# Patient Record
Sex: FEMALE | Race: WHITE | NOT HISPANIC OR LATINO | Employment: FULL TIME | URBAN - METROPOLITAN AREA
[De-identification: names, ages, dates, MRNs, and addresses within clinical notes are randomized per-mention and may not be internally consistent; named-entity substitution may affect disease eponyms.]

---

## 2018-01-10 DIAGNOSIS — G56.03 BILATERAL CARPAL TUNNEL SYNDROME: Primary | ICD-10-CM

## 2018-01-10 PROBLEM — G56.00 CTS (CARPAL TUNNEL SYNDROME): Status: ACTIVE | Noted: 2018-01-10

## 2018-01-10 NOTE — PROGRESS NOTES
Patient is having numbness, tingling and pain in the right hand worse than the left. The pain is waking her at night, trouble with tryping and . Strength is intact to testing. Needs diagnostic studies.     Tri Callejas PA-C

## 2018-01-23 ENCOUNTER — HOSPITAL ENCOUNTER (OUTPATIENT)
Dept: NEUROLOGY | Facility: HOSPITAL | Age: 58
Discharge: HOME | End: 2018-01-23
Admitting: PHYSICIAN ASSISTANT

## 2018-01-23 PROCEDURE — 95910 NRV CNDJ TEST 7-8 STUDIES: CPT

## 2018-01-23 PROCEDURE — 95886 MUSC TEST DONE W/N TEST COMP: CPT

## 2018-01-24 ENCOUNTER — OFFICE VISIT (OUTPATIENT)
Dept: ORTHOPEDIC SURGERY | Facility: CLINIC | Age: 58
End: 2018-01-24

## 2018-01-24 VITALS
DIASTOLIC BLOOD PRESSURE: 71 MMHG | SYSTOLIC BLOOD PRESSURE: 135 MMHG | BODY MASS INDEX: 22.85 KG/M2 | HEIGHT: 63 IN | WEIGHT: 128.97 LBS | HEART RATE: 74 BPM

## 2018-01-24 DIAGNOSIS — M75.42 IMPINGEMENT SYNDROME OF LEFT SHOULDER: Primary | ICD-10-CM

## 2018-01-24 DIAGNOSIS — M75.41 IMPINGEMENT SYNDROME OF RIGHT SHOULDER: ICD-10-CM

## 2018-01-24 PROCEDURE — 99213 OFFICE O/P EST LOW 20 MIN: CPT | Performed by: ORTHOPAEDIC SURGERY

## 2018-01-24 PROCEDURE — 20610 DRAIN/INJ JOINT/BURSA W/O US: CPT | Performed by: ORTHOPAEDIC SURGERY

## 2018-01-24 RX ORDER — TRIAMCINOLONE ACETONIDE 40 MG/ML
40 INJECTION, SUSPENSION INTRA-ARTICULAR; INTRAMUSCULAR
Status: COMPLETED | OUTPATIENT
Start: 2018-01-24 | End: 2018-01-24

## 2018-01-24 RX ORDER — ACYCLOVIR 400 MG/1
400 TABLET ORAL 2 TIMES DAILY
COMMUNITY
End: 2018-11-06 | Stop reason: SDUPTHER

## 2018-01-24 RX ORDER — ALPRAZOLAM 0.5 MG/1
0.5 TABLET ORAL 2 TIMES DAILY PRN
COMMUNITY
End: 2021-02-12 | Stop reason: SDUPTHER

## 2018-01-24 RX ORDER — LIDOCAINE HYDROCHLORIDE 10 MG/ML
4 INJECTION, SOLUTION INFILTRATION; PERINEURAL
Status: COMPLETED | OUTPATIENT
Start: 2018-01-24 | End: 2018-01-24

## 2018-01-24 RX ORDER — NAPROXEN SODIUM 220 MG
400 TABLET ORAL 2 TIMES DAILY PRN
COMMUNITY
End: 2018-10-17 | Stop reason: SDUPTHER

## 2018-01-24 RX ORDER — RANITIDINE HCL 75 MG
150 TABLET ORAL DAILY
COMMUNITY
End: 2021-02-18

## 2018-01-24 RX ADMIN — LIDOCAINE HYDROCHLORIDE 4 ML: 10 INJECTION, SOLUTION INFILTRATION; PERINEURAL at 09:49

## 2018-01-24 RX ADMIN — TRIAMCINOLONE ACETONIDE 40 MG: 40 INJECTION, SUSPENSION INTRA-ARTICULAR; INTRAMUSCULAR at 09:49

## 2018-01-24 RX ADMIN — LIDOCAINE HYDROCHLORIDE 4 ML: 10 INJECTION, SOLUTION INFILTRATION; PERINEURAL at 09:50

## 2018-01-24 RX ADMIN — TRIAMCINOLONE ACETONIDE 40 MG: 40 INJECTION, SUSPENSION INTRA-ARTICULAR; INTRAMUSCULAR at 09:50

## 2018-01-24 NOTE — PROGRESS NOTES
Procedure   Large Joint Arthrocentesis  Date/Time: 1/24/2018 9:50 AM  Consent given by: patient  Site marked: site marked  Timeout: Immediately prior to procedure a time out was called to verify the correct patient, procedure, equipment, support staff and site/side marked as required   Supporting Documentation  Indications: pain   Procedure Details  Location: shoulder - L subacromial bursa  Preparation: Patient was prepped and draped in the usual sterile fashion  Needle size: 22 G  Approach: posterior  Medications administered: 4 mL lidocaine 1 %; 40 mg triamcinolone acetonide 40 MG/ML  Patient tolerance: patient tolerated the procedure well with no immediate complications

## 2018-01-24 NOTE — PROGRESS NOTES
[facility] [place] [facility] Note    Subjective     Chief Complaint   Patient presents with   • Left Shoulder - Pain   • Right Shoulder - Pain        HPI    Yeni Olivo is a 57 y.o. female. She presents today for evaluation of bilateral shoulder pain.  She has a known history of rotator cuff tendinitis in both her shoulders.  She had injections about 9 months ago, which provided relief.  The pain is moderate in severity, aching in quality, sharp at times.  The left bothers her more than the right.  This workup has included MRI of the left shoulder.  Previous x-rays have been obtained of both shoulders.      Patient Active Problem List   Diagnosis   • CTS (carpal tunnel syndrome)     Past Medical History:   Diagnosis Date   • Breast cancer      LEFT    • Drug therapy    • Radiation       Past Surgical History:   Procedure Laterality Date   • BREAST BIOPSY     •  SECTION     • HYSTERECTOMY     • MASTECTOMY      BILATERAL      Family History   Problem Relation Age of Onset   • COPD Mother    • No Known Problems Father    • No Known Problems Sister    • No Known Problems Brother    • No Known Problems Daughter    • No Known Problems Son    • Stroke Maternal Grandmother    • No Known Problems Paternal Grandmother    • No Known Problems Maternal Aunt    • No Known Problems Paternal Aunt      Social History     Social History   • Marital status:      Spouse name: N/A   • Number of children: N/A   • Years of education: N/A     Occupational History   • Not on file.     Social History Main Topics   • Smoking status: Current Every Day Smoker     Packs/day: 1.00     Years: 30.00     Types: Cigarettes   • Smokeless tobacco: Never Used   • Alcohol use No   • Drug use: No   • Sexual activity: Not on file     Other Topics Concern   • Not on file     Social History Narrative      Current Outpatient Prescriptions on File Prior to Visit   Medication Sig Dispense Refill   • venlafaxine XR (EFFEXOR-XR) 150  "MG 24 hr capsule Take 1 capsule by mouth Daily. 90 capsule 1   • amoxicillin (AMOXIL) 875 MG tablet Take 1 tablet by mouth 2 (Two) Times a Day. 20 tablet 0   • ciprofloxacin (CILOXAN) 0.3 % ophthalmic solution Administer 4 drops into affected ear(s) 3 (Three) Times a Day for 10 days. 5 mL 0     No current facility-administered medications on file prior to visit.       Allergies   Allergen Reactions   • Codeine Nausea Only        Review of Systems   Constitutional: Positive for activity change. Negative for chills and fever.   HENT: Positive for ear pain and hearing loss.    Eyes: Negative.    Respiratory: Negative.    Cardiovascular: Negative.    Gastrointestinal: Positive for diarrhea.   Endocrine: Negative.    Genitourinary: Negative.    Musculoskeletal: Positive for arthralgias (Bilateral shoulder pain), back pain, joint swelling and neck stiffness.   Skin: Negative.    Allergic/Immunologic: Negative.    Neurological: Negative.    Hematological: Bruises/bleeds easily.   Psychiatric/Behavioral: The patient is nervous/anxious.         Objective      Physical Exam  /71  Pulse 74  Ht 161 cm (63.39\")  Wt 58.5 kg (128 lb 15.5 oz)  BMI 22.57 kg/m2    Body mass index is 22.57 kg/(m^2).    General:   Mental Status:  Alert   Appearance: Cooperative, in no acute distress   Build and Nutrition: Well-nourished and well developed female   Orientation: Alert and oriented to person, place and time   Posture: Normal   Gait: Normal    Integument:   Right shoulder: No skin lesions, no rash, no ecchymosis   Left shoulder: No skin lesions, no rash, no ecchymosis    Upper Extremities:   Right Shoulder:    Tenderness: None    Swelling:  None    Crepitus:  None    Atrophy:  None    Range of motion:  External rotation:  30°       Forward flexion:  170°       Abduction:   150°  Instability:  None  Deformities:  None  Functional testing: Negative drop arm, negative lift-off, positive impingement   Left Shoulder:    Tenderness: "  None    Swelling:  None    Crepitus: None    Atrophy:  None    Range of motion:  External rotation:  30°       Forward flexion:  170°       Abduction:   150°  Instability:  None  Deformities:  None  Functional testing: Negative drop arm, negative lift-off, positive impingement      Assessment and Plan     Yeni was seen today for pain and pain.    Diagnoses and all orders for this visit:    Impingement syndrome of left shoulder  -     Large Joint Arthrocentesis    Impingement syndrome of right shoulder  -     Large Joint Arthrocentesis        I reviewed my findings with patient today.  She is bothered with bilateral shoulder rotator cuff tendinitis and impingement.  Acromial injections were provided today.  I will see her back in 3 months, but sooner for any problems.  Of note, she's had 2 previous injections in the right shoulder, and 5 previous injections of the left shoulder, starting in 2014.    Of note, she had about 85% relief in both her shoulders just a few minutes following the injections.    Return in about 3 months (around 4/24/2018).      Medical Decision Making  Management Options : prescription/IM medicine        Reginaldo Virk MD  01/24/18  10:17 AM

## 2018-01-24 NOTE — PROGRESS NOTES
Procedure   Large Joint Arthrocentesis  Date/Time: 1/24/2018 9:49 AM  Consent given by: patient  Site marked: site marked  Timeout: Immediately prior to procedure a time out was called to verify the correct patient, procedure, equipment, support staff and site/side marked as required   Supporting Documentation  Indications: pain   Procedure Details  Location: shoulder - R subacromial bursa  Preparation: Patient was prepped and draped in the usual sterile fashion  Needle size: 22 G  Approach: posterior  Medications administered: 4 mL lidocaine 1 %; 40 mg triamcinolone acetonide 40 MG/ML  Patient tolerance: patient tolerated the procedure well with no immediate complications

## 2018-01-29 ENCOUNTER — OFFICE VISIT (OUTPATIENT)
Dept: NEUROSURGERY | Facility: CLINIC | Age: 58
End: 2018-01-29

## 2018-01-29 ENCOUNTER — PREP FOR SURGERY (OUTPATIENT)
Dept: OTHER | Facility: HOSPITAL | Age: 58
End: 2018-01-29

## 2018-01-29 VITALS
BODY MASS INDEX: 23.21 KG/M2 | SYSTOLIC BLOOD PRESSURE: 122 MMHG | DIASTOLIC BLOOD PRESSURE: 84 MMHG | HEIGHT: 63 IN | TEMPERATURE: 98.6 F | WEIGHT: 131 LBS

## 2018-01-29 DIAGNOSIS — G56.01 CARPAL TUNNEL SYNDROME OF RIGHT WRIST: Primary | ICD-10-CM

## 2018-01-29 PROCEDURE — 99214 OFFICE O/P EST MOD 30 MIN: CPT | Performed by: PHYSICIAN ASSISTANT

## 2018-01-29 RX ORDER — ACETAMINOPHEN 325 MG/1
650 TABLET ORAL ONCE
Status: CANCELLED | OUTPATIENT
Start: 2018-01-29 | End: 2018-01-29

## 2018-01-29 RX ORDER — CEFAZOLIN SODIUM 2 G/100ML
2 INJECTION, SOLUTION INTRAVENOUS ONCE
Status: CANCELLED | OUTPATIENT
Start: 2018-01-29 | End: 2018-01-29

## 2018-01-29 RX ORDER — IBUPROFEN 800 MG/1
800 TABLET ORAL ONCE
Status: CANCELLED | OUTPATIENT
Start: 2018-01-29 | End: 2018-01-29

## 2018-01-29 RX ORDER — HYDROCODONE BITARTRATE AND ACETAMINOPHEN 7.5; 325 MG/1; MG/1
1 TABLET ORAL ONCE
Status: CANCELLED | OUTPATIENT
Start: 2018-01-29 | End: 2018-01-29

## 2018-01-29 RX ORDER — SODIUM CHLORIDE 0.9 % (FLUSH) 0.9 %
1-10 SYRINGE (ML) INJECTION AS NEEDED
Status: CANCELLED | OUTPATIENT
Start: 2018-01-29

## 2018-01-29 RX ORDER — SODIUM CHLORIDE, SODIUM LACTATE, POTASSIUM CHLORIDE, CALCIUM CHLORIDE 600; 310; 30; 20 MG/100ML; MG/100ML; MG/100ML; MG/100ML
100 INJECTION, SOLUTION INTRAVENOUS CONTINUOUS
Status: CANCELLED | OUTPATIENT
Start: 2018-01-29

## 2018-01-29 NOTE — PROGRESS NOTES
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  Patient has had several months of burning in the first second and third digit of her right hand as well as some weakness in the pincer .  Patient was consulted in the hospital by Laura Callejas, and recommended that we get an EMG nerve conduction study of the upper extremities.  Patient is actually new patient to our practice, but is well-known to us.    Patient presented today with EMG nerve conduction study of the right upper extremity that showed mild carpal tunnel bilaterally.  However, Laura Callejas has talked with electrophysiologist and he stated that her symptoms are much more impressive than the readout showed.  Patient has a obviously positive Tinel and Phalen test on the right.  Patient is waking up at night shaking her hand and is having difficulty with  strength.  All these in combination makes us think that she would be a good candidate for right carpal tunnel release.    Review of Systems  14 point review systems performed and negative other than history of present illness.      Past Medical History:     Past Medical History:   Diagnosis Date   • Breast cancer      LEFT    • Drug therapy    • Radiation        Family History:     Family History   Problem Relation Age of Onset   • COPD Mother    • No Known Problems Father    • No Known Problems Sister    • No Known Problems Brother    • No Known Problems Daughter    • No Known Problems Son    • Stroke Maternal Grandmother    • No Known Problems Paternal Grandmother    • No Known Problems Maternal Aunt    • No Known Problems Paternal Aunt        Social History:    reports that she has been smoking Cigarettes.  She has a 30.00 pack-year smoking  "history. She has never used smokeless tobacco. She reports that she does not drink alcohol or use illicit drugs.   SMOKING STATUS: I spent greater than 10 minutes educating the patient on smoking cessation, and discussed how smoking pertains to the particular disease process at hand.  The patient acknowledges understanding.      Surgical History:     Past Surgical History:   Procedure Laterality Date   • BREAST BIOPSY     •  SECTION     • HYSTERECTOMY     • MASTECTOMY      BILATERAL       Allergies:   Codeine    Physical Exam:    Vital Signs:/84 (BP Location: Left arm, Patient Position: Sitting)  Temp 98.6 °F (37 °C) (Temporal Artery )   Ht 161 cm (63.39\")  Wt 59.4 kg (131 lb)  BMI 22.92 kg/m2   BMI: Body mass index is 22.92 kg/(m^2).    GENEREAL:   The patient is in no acute distress, and is able to answer all questions appropriately.  HEENT: Pupils are equal and reactive to light.  Visual fields are full.  Extraocular movements are intact without nystagmus.  There is no evidence of trauma.  Neck: Supple without lymphadenopathy  Cardiovascular: Regular rate and rhythm   Abdomen: Soft, non-tender, non-distended.    Musculoskeletal: The patient’s strength is intact in upper and lower extremities upon direct testing.   strength is 5 out of 5 bilaterally. Shoulder abduction is 5 out of 5.  Dorsiflexion and plantarflexion are equal bilaterally as well as the hip flexion against resistance.  The patient’s gait is normal without antalgia.  Neurologic: The patient is alert and oriented by 3.  Recent memory, language, attention span, and fund of knowledge are all with within normal limits.  There is no evidence of central motor drift. No facial droop.  No difficulty with rapid alternating movements.  Sensation is equal bilaterally with no deficit.    Reflexes are 2+ at biceps, triceps, brachioradialis, as well as the patellar and Achilles tendon bilaterally.    Patient has a positive Tinel and Phalen " test on the right.  Patient has some decreased pinprick and light touch testing the first second and third digit and palmaris aspect of her right hand.      Medical Decision Making    Data Review:   MRI cervical spine was reviewed and showed mild to moderate stenosis at C5 6 and C6 7 level.  But there is no rightward protrusion that would account for her pain.  EMG and nerve Study was reviewed and discussed with Dr. Vasquez.  The readout was less impressive to than her actual symptoms.  Laura Callejas's discussed this with Dr. Núñez, and we elected to proceed with the release.    Diagnosis:   Right carpal tunnel syndrome    Treatment Options:   We have elected to proceed with a right carpal tunnel release.  I reviewed risks, benefits, and expected outcomes with patient.  Patient does have arthritis in her hands and this is not expected to improve, however, the numbness, tingling/burning in her first second and third digits as well as some of the strength in her right hand should improve over time.     It has been a pleasure providing neurosurgical care.    Blu Peacock PA-C      No diagnosis found.

## 2018-03-05 ENCOUNTER — LAB REQUISITION (OUTPATIENT)
Dept: LAB | Facility: HOSPITAL | Age: 58
End: 2018-03-05

## 2018-03-05 ENCOUNTER — OUTSIDE FACILITY SERVICE (OUTPATIENT)
Dept: GASTROENTEROLOGY | Facility: CLINIC | Age: 58
End: 2018-03-05

## 2018-03-05 DIAGNOSIS — Z12.11 ENCOUNTER FOR SCREENING FOR MALIGNANT NEOPLASM OF COLON: ICD-10-CM

## 2018-03-05 PROCEDURE — 88305 TISSUE EXAM BY PATHOLOGIST: CPT | Performed by: INTERNAL MEDICINE

## 2018-03-05 PROCEDURE — 45380 COLONOSCOPY AND BIOPSY: CPT | Performed by: INTERNAL MEDICINE

## 2018-03-05 PROCEDURE — G0500 MOD SEDAT ENDO SERVICE >5YRS: HCPCS | Performed by: INTERNAL MEDICINE

## 2018-03-06 LAB
CYTO UR: NORMAL
LAB AP CASE REPORT: NORMAL
LAB AP CLINICAL INFORMATION: NORMAL
Lab: NORMAL
PATH REPORT.FINAL DX SPEC: NORMAL
PATH REPORT.GROSS SPEC: NORMAL

## 2018-03-07 ENCOUNTER — APPOINTMENT (OUTPATIENT)
Dept: NEUROLOGY | Facility: HOSPITAL | Age: 58
End: 2018-03-07

## 2018-03-12 ENCOUNTER — TELEPHONE (OUTPATIENT)
Dept: GASTROENTEROLOGY | Facility: CLINIC | Age: 58
End: 2018-03-12

## 2018-03-12 NOTE — TELEPHONE ENCOUNTER
----- Message from MARIA Garcia sent at 3/9/2018 10:36 AM EST -----      ----- Message -----     From: Mat Wiggins MD     Sent: 3/6/2018   6:11 PM       To: MARIA Garcia, Arabella Alva MA    Please tell Yeni that the polyp was benign. Previous history merits a follow up colonoscopy in 5 years. Dr. Wiggins

## 2018-04-23 ENCOUNTER — OFFICE VISIT (OUTPATIENT)
Dept: ORTHOPEDIC SURGERY | Facility: CLINIC | Age: 58
End: 2018-04-23

## 2018-04-23 VITALS
HEIGHT: 63 IN | DIASTOLIC BLOOD PRESSURE: 67 MMHG | WEIGHT: 132 LBS | HEART RATE: 87 BPM | BODY MASS INDEX: 23.39 KG/M2 | SYSTOLIC BLOOD PRESSURE: 121 MMHG

## 2018-04-23 DIAGNOSIS — M75.42 IMPINGEMENT SYNDROME OF LEFT SHOULDER: Primary | ICD-10-CM

## 2018-04-23 DIAGNOSIS — M75.41 IMPINGEMENT SYNDROME OF RIGHT SHOULDER: ICD-10-CM

## 2018-04-23 PROCEDURE — 20610 DRAIN/INJ JOINT/BURSA W/O US: CPT | Performed by: ORTHOPAEDIC SURGERY

## 2018-04-23 RX ORDER — LIDOCAINE HYDROCHLORIDE 10 MG/ML
4 INJECTION, SOLUTION INFILTRATION; PERINEURAL
Status: COMPLETED | OUTPATIENT
Start: 2018-04-23 | End: 2018-04-23

## 2018-04-23 RX ORDER — TRIAMCINOLONE ACETONIDE 40 MG/ML
40 INJECTION, SUSPENSION INTRA-ARTICULAR; INTRAMUSCULAR
Status: COMPLETED | OUTPATIENT
Start: 2018-04-23 | End: 2018-04-23

## 2018-04-23 RX ADMIN — LIDOCAINE HYDROCHLORIDE 4 ML: 10 INJECTION, SOLUTION INFILTRATION; PERINEURAL at 16:21

## 2018-04-23 RX ADMIN — TRIAMCINOLONE ACETONIDE 40 MG: 40 INJECTION, SUSPENSION INTRA-ARTICULAR; INTRAMUSCULAR at 16:21

## 2018-04-23 RX ADMIN — TRIAMCINOLONE ACETONIDE 40 MG: 40 INJECTION, SUSPENSION INTRA-ARTICULAR; INTRAMUSCULAR at 16:23

## 2018-04-23 RX ADMIN — LIDOCAINE HYDROCHLORIDE 4 ML: 10 INJECTION, SOLUTION INFILTRATION; PERINEURAL at 16:23

## 2018-04-23 NOTE — PROGRESS NOTES
Procedure   Large Joint Arthrocentesis  Date/Time: 4/23/2018 4:21 PM  Consent given by: patient  Site marked: site marked  Timeout: Immediately prior to procedure a time out was called to verify the correct patient, procedure, equipment, support staff and site/side marked as required   Supporting Documentation  Indications: pain   Procedure Details  Location: shoulder - R subacromial bursa  Preparation: Patient was prepped and draped in the usual sterile fashion  Needle size: 22 G  Approach: anterolateral  Medications administered: 4 mL lidocaine 1 %; 40 mg triamcinolone acetonide 40 MG/ML  Patient tolerance: patient tolerated the procedure well with no immediate complications

## 2018-04-23 NOTE — PROGRESS NOTES
Procedure   Large Joint Arthrocentesis  Date/Time: 4/23/2018 4:23 PM  Consent given by: patient  Site marked: site marked  Timeout: Immediately prior to procedure a time out was called to verify the correct patient, procedure, equipment, support staff and site/side marked as required   Supporting Documentation  Indications: pain   Procedure Details  Location: shoulder - L subacromial bursa  Preparation: Patient was prepped and draped in the usual sterile fashion  Needle size: 22 G  Approach: anterolateral  Medications administered: 4 mL lidocaine 1 %; 40 mg triamcinolone acetonide 40 MG/ML  Patient tolerance: patient tolerated the procedure well with no immediate complications

## 2018-04-23 NOTE — PROGRESS NOTES
[facility] [place] [place] [facility] Clinic Note    Subjective     Chief Complaint   Patient presents with   • Right Shoulder - Follow-up     Impingement syndrome of (R) shoulder   • Left Shoulder - Follow-up     Impingement syndrome of (L) shoulder        HPI    Yeni Olivo is a 57 y.o. female. She follows up today for both her shoulders.  She had good relief with the injections for several weeks, and is resting point where she would like repeat injections today.  No other new complaints today.  The pain is moderate in severity, aching in quality, and stabbing.      Patient Active Problem List   Diagnosis   • CTS (carpal tunnel syndrome)     Past Medical History:   Diagnosis Date   • Breast cancer      LEFT    • Drug therapy    • Radiation       Past Surgical History:   Procedure Laterality Date   • BREAST BIOPSY     •  SECTION     • HYSTERECTOMY     • MASTECTOMY      BILATERAL      Family History   Problem Relation Age of Onset   • COPD Mother    • No Known Problems Father    • No Known Problems Sister    • No Known Problems Brother    • No Known Problems Daughter    • No Known Problems Son    • Stroke Maternal Grandmother    • No Known Problems Paternal Grandmother    • No Known Problems Maternal Aunt    • No Known Problems Paternal Aunt      Social History     Social History   • Marital status:      Spouse name: N/A   • Number of children: N/A   • Years of education: N/A     Occupational History   • Not on file.     Social History Main Topics   • Smoking status: Current Every Day Smoker     Packs/day: 1.00     Years: 30.00     Types: Cigarettes   • Smokeless tobacco: Never Used   • Alcohol use No   • Drug use: No   • Sexual activity: Defer     Other Topics Concern   • Not on file     Social History Narrative   • No narrative on file      Current Outpatient Prescriptions on File Prior to Visit   Medication Sig Dispense Refill   • acyclovir (ZOVIRAX) 400 MG tablet Take 400 mg by mouth 2 (Two)  Times a Day. Take no more than 5 doses a day.     • acyclovir (ZOVIRAX) 400 MG tablet Take 1 tablet by mouth 2 (Two) Times a Day 180 tablet 1   • ALPRAZolam (XANAX) 0.5 MG tablet Take 0.5 mg by mouth 2 (Two) Times a Day As Needed for Anxiety.     • Chlorhexidine Gluconate 4 % solution Shower each day with solution for 5 days beginning 5 days before surgery. 237 mL 0   • Multiple Vitamins-Minerals (MULTIVITAMIN ADULT PO) Take  by mouth Daily.     • naproxen sodium (ALEVE) 220 MG tablet Take 400 mg by mouth 2 (Two) Times a Day As Needed.     • raNITIdine (ZANTAC) 75 MG tablet Take 50 mg by mouth Daily.     • venlafaxine XR (EFFEXOR-XR) 150 MG 24 hr capsule Take 1 capsule by mouth Daily. 90 capsule 1     No current facility-administered medications on file prior to visit.       Allergies   Allergen Reactions   • Codeine Nausea Only        Review of Systems   Constitutional: Negative for activity change, appetite change, chills, diaphoresis, fatigue, fever and unexpected weight change.   HENT: Negative for congestion, dental problem, drooling, ear discharge, ear pain, facial swelling, hearing loss, mouth sores, nosebleeds, postnasal drip, rhinorrhea, sinus pressure, sneezing, sore throat, tinnitus, trouble swallowing and voice change.    Eyes: Negative for photophobia, pain, discharge, redness, itching and visual disturbance.   Respiratory: Negative for apnea, cough, choking, chest tightness, shortness of breath, wheezing and stridor.    Cardiovascular: Negative for chest pain, palpitations and leg swelling.   Gastrointestinal: Negative for abdominal distention, abdominal pain, anal bleeding, blood in stool, constipation, diarrhea, nausea, rectal pain and vomiting.   Endocrine: Negative for cold intolerance, heat intolerance, polydipsia, polyphagia and polyuria.   Genitourinary: Negative for decreased urine volume, difficulty urinating, dysuria, enuresis, flank pain, frequency, genital sores, hematuria and urgency.  "  Musculoskeletal: Positive for joint swelling. Negative for arthralgias, back pain, gait problem, myalgias, neck pain and neck stiffness.        Joint Pain    Skin: Negative for color change, pallor, rash and wound.   Allergic/Immunologic: Negative for environmental allergies, food allergies and immunocompromised state.   Neurological: Negative for dizziness, tremors, seizures, syncope, facial asymmetry, speech difficulty, weakness, light-headedness, numbness and headaches.   Hematological: Negative for adenopathy. Does not bruise/bleed easily.   Psychiatric/Behavioral: Negative for agitation, behavioral problems, confusion, decreased concentration, dysphoric mood, hallucinations, self-injury, sleep disturbance and suicidal ideas. The patient is not nervous/anxious and is not hyperactive.         Objective      Physical Exam  /67   Pulse 87   Ht 161 cm (63.39\")   Wt 59.9 kg (132 lb)   BMI 23.10 kg/m²     Body mass index is 23.1 kg/m².    General:   Mental Status:  Alert   Appearance: Cooperative, in no acute distress   Build and Nutrition: Well-nourished and well developed female   Orientation: Alert and oriented to person, place and time   Posture: Normal   Gait: Normal    Integument:   Right shoulder: No skin lesions, no rash, no ecchymosis   Left shoulder: No skin lesions, no rash, no ecchymosis    Upper Extremities:   Right Shoulder:    Tenderness: None    Swelling:  None    Crepitus:  None    Atrophy:  None    Range of motion:  External rotation:  70°       Forward flexion:  170°       Abduction:   170°  Instability:  None  Deformities:  None  Functional testing: Negative drop arm, negative lift-off, mildly positive impingement   Left Shoulder:    Tenderness:  None    Swelling:  None    Crepitus: None    Atrophy:  None    Range of motion:  External rotation:  70°       Forward flexion:  170°       Abduction:   170°  Instability:  None  Deformities:  None  Functional testing: Negative drop arm, " negative lift-off, positive impingement          Assessment and Plan     Yeni was seen today for follow-up and follow-up.    Diagnoses and all orders for this visit:    Impingement syndrome of left shoulder  -     Large Joint Arthrocentesis  -     lidocaine (XYLOCAINE) 1 % injection 4 mL; 4 mL Once.  -     triamcinolone acetonide (KENALOG-40) injection 40 mg; 40 mg Once.    Impingement syndrome of right shoulder  -     Large Joint Arthrocentesis  -     lidocaine (XYLOCAINE) 1 % injection 4 mL; 4 mL Once.  -     triamcinolone acetonide (KENALOG-40) injection 40 mg; 40 mg Once.        I reviewed my findings with patient today.  She has bilateral shoulder impingement, and would like repeat injections today.  Please see my procedure notes for details.  I will see her back in 3 months, with new x-rays.  I will see her back sooner for any problems.  We may consider repeat imaging with MRI if appropriate in the future.    Of note, she had 100% relief just payments following the injections.    Return in about 3 months (around 7/23/2018).      Medical Decision Making  Management Options : prescription/IM medicine      Reginaldo Virk MD  04/23/18  4:34 PM

## 2018-06-20 PROCEDURE — 88300 SURGICAL PATH GROSS: CPT | Performed by: OTOLARYNGOLOGY

## 2018-06-21 ENCOUNTER — LAB REQUISITION (OUTPATIENT)
Dept: LAB | Facility: HOSPITAL | Age: 58
End: 2018-06-21

## 2018-06-21 DIAGNOSIS — K11.5 SIALOLITHIASIS: ICD-10-CM

## 2018-06-21 LAB
LAB AP CASE REPORT: NORMAL
LAB AP CLINICAL INFORMATION: NORMAL
PATH REPORT.FINAL DX SPEC: NORMAL
PATH REPORT.GROSS SPEC: NORMAL

## 2018-09-05 ENCOUNTER — OFFICE VISIT (OUTPATIENT)
Dept: ORTHOPEDIC SURGERY | Facility: CLINIC | Age: 58
End: 2018-09-05

## 2018-09-05 VITALS — HEART RATE: 71 BPM | BODY MASS INDEX: 21.95 KG/M2 | OXYGEN SATURATION: 99 % | WEIGHT: 123.9 LBS | HEIGHT: 63 IN

## 2018-09-05 DIAGNOSIS — M75.41 IMPINGEMENT SYNDROME OF BOTH SHOULDERS: Primary | ICD-10-CM

## 2018-09-05 DIAGNOSIS — M75.42 IMPINGEMENT SYNDROME OF BOTH SHOULDERS: Primary | ICD-10-CM

## 2018-09-05 PROCEDURE — 99213 OFFICE O/P EST LOW 20 MIN: CPT | Performed by: ORTHOPAEDIC SURGERY

## 2018-09-05 PROCEDURE — 20610 DRAIN/INJ JOINT/BURSA W/O US: CPT | Performed by: ORTHOPAEDIC SURGERY

## 2018-09-05 RX ORDER — TRIAMCINOLONE ACETONIDE 40 MG/ML
40 INJECTION, SUSPENSION INTRA-ARTICULAR; INTRAMUSCULAR
Status: COMPLETED | OUTPATIENT
Start: 2018-09-05 | End: 2018-09-05

## 2018-09-05 RX ORDER — LIDOCAINE HYDROCHLORIDE 10 MG/ML
4 INJECTION, SOLUTION INFILTRATION; PERINEURAL
Status: COMPLETED | OUTPATIENT
Start: 2018-09-05 | End: 2018-09-05

## 2018-09-05 RX ADMIN — LIDOCAINE HYDROCHLORIDE 4 ML: 10 INJECTION, SOLUTION INFILTRATION; PERINEURAL at 16:18

## 2018-09-05 RX ADMIN — TRIAMCINOLONE ACETONIDE 40 MG: 40 INJECTION, SUSPENSION INTRA-ARTICULAR; INTRAMUSCULAR at 16:18

## 2018-09-05 NOTE — PROGRESS NOTES
Procedure   Large Joint Arthrocentesis  Date/Time: 9/5/2018 4:18 PM  Consent given by: patient  Site marked: site marked  Timeout: Immediately prior to procedure a time out was called to verify the correct patient, procedure, equipment, support staff and site/side marked as required   Supporting Documentation  Indications: pain   Procedure Details  Location: shoulder - R subacromial bursa  Preparation: Patient was prepped and draped in the usual sterile fashion  Needle size: 22 G  Approach: posterior  Medications administered: 4 mL lidocaine 1 %; 40 mg triamcinolone acetonide 40 MG/ML  Patient tolerance: patient tolerated the procedure well with no immediate complications    Large Joint Arthrocentesis  Date/Time: 9/5/2018 4:18 PM  Consent given by: patient  Site marked: site marked  Timeout: Immediately prior to procedure a time out was called to verify the correct patient, procedure, equipment, support staff and site/side marked as required   Supporting Documentation  Indications: pain   Procedure Details  Location: shoulder - L subacromial bursa  Preparation: Patient was prepped and draped in the usual sterile fashion  Needle size: 22 G  Approach: posterior  Medications administered: 4 mL lidocaine 1 %; 40 mg triamcinolone acetonide 40 MG/ML  Patient tolerance: patient tolerated the procedure well with no immediate complications

## 2018-09-05 NOTE — PROGRESS NOTES
[facility] [place] [facility] Note    Subjective     Chief Complaint   Patient presents with   • Follow-up     4 months - Impingement syndrome of Bilateral shoulders        HPI    Yeni Olivo is a 58 y.o. female.  She follows up today for both her shoulders.  The left one bothers her more than the right.  She has good improvement with subacromial injections, and would like injections today.  The pain is moderate in severity, aching in quality, worse with overhead activities.      Patient Active Problem List   Diagnosis   • CTS (carpal tunnel syndrome)     Past Medical History:   Diagnosis Date   • Breast cancer (CMS/HCC)      LEFT    • Drug therapy    • Radiation       Past Surgical History:   Procedure Laterality Date   • BREAST BIOPSY     •  SECTION     • HYSTERECTOMY     • MASTECTOMY      BILATERAL      Family History   Problem Relation Age of Onset   • COPD Mother    • No Known Problems Father    • No Known Problems Sister    • No Known Problems Brother    • No Known Problems Daughter    • No Known Problems Son    • Stroke Maternal Grandmother    • No Known Problems Paternal Grandmother    • No Known Problems Maternal Aunt    • No Known Problems Paternal Aunt      Social History     Social History   • Marital status:      Spouse name: N/A   • Number of children: N/A   • Years of education: N/A     Occupational History   • Not on file.     Social History Main Topics   • Smoking status: Current Every Day Smoker     Packs/day: 1.00     Years: 30.00     Types: Cigarettes   • Smokeless tobacco: Never Used   • Alcohol use No   • Drug use: No   • Sexual activity: Defer     Other Topics Concern   • Not on file     Social History Narrative   • No narrative on file      Current Outpatient Prescriptions on File Prior to Visit   Medication Sig Dispense Refill   • acyclovir (ZOVIRAX) 400 MG tablet Take 400 mg by mouth 2 (Two) Times a Day. Take no more than 5 doses a day.     • acyclovir  (ZOVIRAX) 400 MG tablet Take 1 tablet by mouth 2 (Two) Times a Day. Take no more than 5 doses a day. 180 tablet 1   • acyclovir (ZOVIRAX) 400 MG tablet Take 1 tablet by mouth 2 (Two) Times a Day. Take no more than 5 doses a day. 180 tablet 1   • ALPRAZolam (XANAX) 0.5 MG tablet Take 0.5 mg by mouth 2 (Two) Times a Day As Needed for Anxiety.     • ALPRAZolam (XANAX) 0.5 MG tablet Take 2 tablets by mouth 2 (Two) Times a Day. 360 tablet 0   • cefdinir (OMNICEF) 300 MG capsule Take 1 capsule by mouth 2 (Two) Times a Day for 7 days 14 capsule 0   • Chlorhexidine Gluconate 4 % solution Shower each day with solution for 5 days beginning 5 days before surgery. 237 mL 0   • HYDROcodone-acetaminophen (NORCO)  MG per tablet Take 1 tablet by mouth Every 4 (Four) Hours As Needed for pain 25 tablet 0   • Multiple Vitamins-Minerals (MULTIVITAMIN ADULT PO) Take  by mouth Daily.     • naproxen sodium (ALEVE) 220 MG tablet Take 400 mg by mouth 2 (Two) Times a Day As Needed.     • ondansetron ODT (ZOFRAN-ODT) 4 MG disintegrating tablet Dissolve 1 tablet by mouth Every 4 (Four) Hours As Needed for nausea and vomiting 10 tablet 0   • raNITIdine (ZANTAC) 75 MG tablet Take 50 mg by mouth Daily.     • venlafaxine XR (EFFEXOR-XR) 150 MG 24 hr capsule Take 1 capsule by mouth Daily. 90 capsule 1     No current facility-administered medications on file prior to visit.       Allergies   Allergen Reactions   • Codeine Nausea Only        Review of Systems   Constitutional: Positive for fatigue. Negative for activity change, appetite change, chills, diaphoresis, fever and unexpected weight change.   HENT: Positive for ear pain and hearing loss. Negative for congestion, dental problem, drooling, ear discharge, facial swelling, mouth sores, nosebleeds, postnasal drip, rhinorrhea, sinus pressure, sneezing, sore throat, tinnitus, trouble swallowing and voice change.    Eyes: Negative for photophobia, pain, discharge, redness, itching and visual  "disturbance.   Respiratory: Negative for apnea, cough, choking, chest tightness, shortness of breath, wheezing and stridor.    Cardiovascular: Negative for chest pain, palpitations and leg swelling.   Gastrointestinal: Negative for abdominal distention, abdominal pain, anal bleeding, blood in stool, constipation, diarrhea, nausea, rectal pain and vomiting.        IBS   Endocrine: Negative for cold intolerance, heat intolerance, polydipsia, polyphagia and polyuria.   Genitourinary: Negative for decreased urine volume, difficulty urinating, dysuria, enuresis, flank pain, frequency, genital sores, hematuria and urgency.   Musculoskeletal: Positive for arthralgias. Negative for back pain, gait problem, joint swelling, myalgias, neck pain and neck stiffness.   Skin: Negative for color change, pallor, rash and wound.   Allergic/Immunologic: Negative for environmental allergies, food allergies and immunocompromised state.   Neurological: Negative for dizziness, tremors, seizures, syncope, facial asymmetry, speech difficulty, weakness, light-headedness, numbness and headaches.   Hematological: Negative for adenopathy. Bruises/bleeds easily.   Psychiatric/Behavioral: Negative for agitation, behavioral problems, confusion, decreased concentration, dysphoric mood, hallucinations, self-injury, sleep disturbance and suicidal ideas. The patient is not nervous/anxious and is not hyperactive.         Depression        Objective      Physical Exam  Pulse 71   Ht 161 cm (63.39\")   Wt 56.2 kg (123 lb 14.4 oz)   SpO2 99%   BMI 21.68 kg/m²     Body mass index is 21.68 kg/m².    General:   Mental Status:  Alert   Appearance: Cooperative, in no acute distress   Build and Nutrition: Well-nourished and well developed female   Orientation: Alert and oriented to person, place and time   Posture: Normal   Gait: Normal    Integument:   Right shoulder: No skin lesions, no rash, no ecchymosis   Left shoulder: No skin lesions, no rash, no " ecchymosis    Upper Extremities:   Right Shoulder:    Tenderness: None    Swelling:  None    Crepitus:  None    Atrophy:  None    Range of motion:  External rotation:  70°       Forward flexion:  170°       Abduction:   160°  Instability:  None  Deformities:  None  Functional testing: Negative drop arm, negative lift-off, negative impingement   Left Shoulder:    Tenderness:  None    Swelling:  None    Crepitus: None    Atrophy:  None    Range of motion:  External rotation:  40°       Forward flexion:  150°       Abduction:   100°  Instability:  None  Deformities:  None  Functional testing: Negative drop arm, negative lift-off, positive impingement        Imaging/Studies      Imaging Results (last 24 hours)     Procedure Component Value Units Date/Time    XR Shoulder 2+ View Bilateral [[id number]] Resulted:  09/05/18 1635     Updated:  09/05/18 1636    Narrative:       Right Shoulder Radiographs  Indication: right shoulder pain  Views: AP, outlet and axillary views of the right shoulder    Comparison: no prior studies available for review    Findings:  No acute bony abnormalities, with a small calcification at the insertion   point on the greater tuberosity, consistent with calcific tendinitis.      Left Shoulder Radiographs  Indication: left shoulder pain  Views: AP, outlet and axillary views of the left shoulder    Comparison: no prior studies available for review    Findings:  No acute bony abnormalities, with calcification at the insertion point of   the supraspinatus, consistent with calcific tendinitis.              Assessment and Plan     Yeni was seen today for follow-up.    Diagnoses and all orders for this visit:    Impingement syndrome of both shoulders  -     XR Shoulder 2+ View Bilateral  -     Large Joint Arthrocentesis  -     Large Joint Arthrocentesis        I reviewed my findings with patient today.  She continues to experience impingement both her shoulders.  She has opted for injections today, and  these were provided.  She had 100% relief just a few minutes following the injections.  I will see her back in 4 months, but sooner for problems.    Return in about 4 months (around 1/5/2019).      Medical Decision Making  Management Options : prescription/IM medicine  Data/Risk: radiology tests and independent visualization of imaging, lab tests, or EMG/NCV      Reginaldo Virk MD  09/05/18  4:41 PM

## 2018-10-17 ENCOUNTER — TELEPHONE (OUTPATIENT)
Dept: ORTHOPEDIC SURGERY | Facility: CLINIC | Age: 58
End: 2018-10-17

## 2018-10-17 ENCOUNTER — OFFICE VISIT (OUTPATIENT)
Dept: ORTHOPEDIC SURGERY | Facility: CLINIC | Age: 58
End: 2018-10-17

## 2018-10-17 VITALS — OXYGEN SATURATION: 98 % | WEIGHT: 118 LBS | HEART RATE: 85 BPM | BODY MASS INDEX: 20.91 KG/M2 | HEIGHT: 63 IN

## 2018-10-17 DIAGNOSIS — M16.11 PRIMARY OSTEOARTHRITIS OF RIGHT HIP: Primary | ICD-10-CM

## 2018-10-17 PROCEDURE — 99214 OFFICE O/P EST MOD 30 MIN: CPT | Performed by: ORTHOPAEDIC SURGERY

## 2018-10-17 RX ORDER — MELOXICAM 7.5 MG/1
7.5 TABLET ORAL DAILY
Qty: 90 TABLET | Refills: 0 | Status: SHIPPED | OUTPATIENT
Start: 2018-10-17 | End: 2018-11-28

## 2018-10-17 NOTE — PROGRESS NOTES
[facility] [place] [facility] Note    Subjective     Chief Complaint   Patient presents with   • Right Hip - Pain        HPI    Yeni Olivo is a 58 y.o. female.  She presents today for evaluation of right hip pain.  No history of trauma, but she's had pain for 6 weeks.  Pain is associated with giving way and spasms.  It worsens with walking, standing, sitting, climbing stairs, work activities and at night.  No improvement with Aleve.  Pain is 4 out of 10 currently, aching and stabbing in quality, and is worsening over time.      Patient Active Problem List   Diagnosis   • CTS (carpal tunnel syndrome)     Past Medical History:   Diagnosis Date   • Breast cancer (CMS/HCC)      LEFT    • Drug therapy    • Radiation       Past Surgical History:   Procedure Laterality Date   • BREAST BIOPSY     •  SECTION     • HYSTERECTOMY     • MASTECTOMY      BILATERAL      Family History   Problem Relation Age of Onset   • COPD Mother    • No Known Problems Father    • No Known Problems Sister    • No Known Problems Brother    • No Known Problems Daughter    • No Known Problems Son    • Stroke Maternal Grandmother    • No Known Problems Paternal Grandmother    • No Known Problems Maternal Aunt    • No Known Problems Paternal Aunt      Social History     Social History   • Marital status:      Spouse name: N/A   • Number of children: N/A   • Years of education: N/A     Occupational History   • Not on file.     Social History Main Topics   • Smoking status: Current Every Day Smoker     Packs/day: 1.00     Years: 30.00     Types: Cigarettes   • Smokeless tobacco: Never Used   • Alcohol use No   • Drug use: No   • Sexual activity: Defer     Other Topics Concern   • Not on file     Social History Narrative   • No narrative on file      Current Outpatient Prescriptions on File Prior to Visit   Medication Sig Dispense Refill   • acyclovir (ZOVIRAX) 400 MG tablet Take 400 mg by mouth 2 (Two) Times a Day. Take  no more than 5 doses a day.     • acyclovir (ZOVIRAX) 400 MG tablet Take 1 tablet by mouth 2 (Two) Times a Day. Take no more than 5 doses a day. 180 tablet 1   • acyclovir (ZOVIRAX) 400 MG tablet Take 1 tablet by mouth 2 (Two) Times a Day. Take no more than 5 doses a day. 180 tablet 1   • ALPRAZolam (XANAX) 0.5 MG tablet Take 0.5 mg by mouth 2 (Two) Times a Day As Needed for Anxiety.     • ALPRAZolam (XANAX) 0.5 MG tablet Take 2 tablets by mouth 2 (Two) Times a Day. 360 tablet 0   • cefdinir (OMNICEF) 300 MG capsule Take 1 capsule by mouth 2 (Two) Times a Day for 7 days 14 capsule 0   • Chlorhexidine Gluconate 4 % solution Shower each day with solution for 5 days beginning 5 days before surgery. 237 mL 0   • hepatitis A (HAVRIX) 1440 EL U/ML vaccine Inject 1 mL into the appropriate muscle as directed by prescriber. Repeat in 6 months 1 mL 1   • HYDROcodone-acetaminophen (NORCO)  MG per tablet Take 1 tablet by mouth Every 4 (Four) Hours As Needed for pain 25 tablet 0   • Multiple Vitamins-Minerals (MULTIVITAMIN ADULT PO) Take  by mouth Daily.     • ondansetron ODT (ZOFRAN-ODT) 4 MG disintegrating tablet Dissolve 1 tablet by mouth Every 4 (Four) Hours As Needed for nausea and vomiting 10 tablet 0   • raNITIdine (ZANTAC) 75 MG tablet Take 50 mg by mouth Daily.     • venlafaxine XR (EFFEXOR-XR) 150 MG 24 hr capsule Take 1 capsule by mouth Daily. 90 capsule 1   • [DISCONTINUED] naproxen sodium (ALEVE) 220 MG tablet Take 400 mg by mouth 2 (Two) Times a Day As Needed.       No current facility-administered medications on file prior to visit.       Allergies   Allergen Reactions   • Codeine Nausea Only   • Metoclopramide Unknown (See Comments)   • Penicillins Unknown (See Comments)        Review of Systems   Constitutional: Negative for activity change, appetite change, chills, diaphoresis, fatigue, fever and unexpected weight change.   HENT: Negative for congestion, dental problem, drooling, ear discharge, ear pain,  "facial swelling, hearing loss, mouth sores, nosebleeds, postnasal drip, rhinorrhea, sinus pressure, sneezing, sore throat, tinnitus, trouble swallowing and voice change.    Eyes: Negative for photophobia, pain, discharge, redness, itching and visual disturbance.   Respiratory: Negative for apnea, cough, choking, chest tightness, shortness of breath, wheezing and stridor.    Cardiovascular: Negative for chest pain, palpitations and leg swelling.   Gastrointestinal: Negative for abdominal distention, abdominal pain, anal bleeding, blood in stool, constipation, diarrhea, nausea, rectal pain and vomiting.   Endocrine: Negative for cold intolerance, heat intolerance, polydipsia, polyphagia and polyuria.   Genitourinary: Negative for decreased urine volume, difficulty urinating, dysuria, enuresis, flank pain, frequency, genital sores, hematuria and urgency.   Musculoskeletal: Positive for joint swelling. Negative for arthralgias, back pain, gait problem, myalgias, neck pain and neck stiffness.   Skin: Negative for color change, pallor, rash and wound.   Allergic/Immunologic: Negative for environmental allergies, food allergies and immunocompromised state.   Neurological: Negative for dizziness, tremors, seizures, syncope, facial asymmetry, speech difficulty, weakness, light-headedness, numbness and headaches.   Hematological: Negative for adenopathy. Does not bruise/bleed easily.   Psychiatric/Behavioral: Negative for agitation, behavioral problems, confusion, decreased concentration, dysphoric mood, hallucinations, self-injury, sleep disturbance and suicidal ideas. The patient is not nervous/anxious and is not hyperactive.         Objective      Physical Exam  Pulse 85   Ht 161 cm (63.39\")   Wt 53.5 kg (118 lb)   SpO2 98%   BMI 20.65 kg/m²     Body mass index is 20.65 kg/m².    General:   Mental Status:  Alert   Appearance: Cooperative, in no acute distress   Build and Nutrition: Well-nourished and well developed " female   Orientation: Alert and oriented to person, place and time   Posture: Normal   Gait: Mildly antalgic on the right    Integument:   Right hip: No skin lesions, no rash, no ecchymosis    Neurologic:   Sensation:    Right foot: Intact to light touch on the dorsal and plantar aspect   Motor:  Right lower extremity: 5/5 quadriceps, hamstrings, ankle dorsiflexors, and ankle plantar flexors    Vascular:   Right lower extremity: 2+ dorsalis pedis pulse, prompt capillary refill    Lower Extremities:   Right Hip:    Tenderness:  None    Swelling: None    Crepitus:  None    Atrophy:  None    Range of motion:  External Rotation: 30°       Internal Rotation: 30°       Flexion:  100°       Extension:  0°   Instability:  None  Deformities:  None  Functional testing: Positive Stinchfield    No leg length discrepancy        Imaging/Studies  Imaging Results (last 24 hours)     Procedure Component Value Units Date/Time    XR Hip With or Without Pelvis 1 View Right [[id number]] Resulted:  10/17/18 1605     Updated:  10/17/18 1606    Narrative:       Right Hip Radiographs  Indication: right hip pain  Views: low AP pelvis and lateral of the right hip    Comparison: no prior studies available for review    Findings:   Arthritic changes are seen, with spur formation on the femoral head, as   well as at the neck region, with no acute bony abnormalities.            Assessment and Plan     Yeni was seen today for pain.    Diagnoses and all orders for this visit:    Primary osteoarthritis of right hip  -     XR Hip With or Without Pelvis 1 View Right  -     MRI Hip Right Without Contrast; Future    Other orders  -     meloxicam (MOBIC) 7.5 MG tablet; Take 1 tablet by mouth Daily with food        I reviewed my findings with patient today.  Radiographs show degenerative changes in the hip, but I'm curious as to if she has an element of avascular necrosis.  At this point, recommended an MRI, and I will see her back after that is been  completed for review.  I will see her back sooner for any problems.    Return for After Imaging Study.      Medical Decision Making  Data/Risk: radiology tests and independent visualization of imaging, lab tests, or EMG/NCV      Reginaldo Virk MD  10/17/18  5:16 PM

## 2018-10-18 ENCOUNTER — HOSPITAL ENCOUNTER (OUTPATIENT)
Dept: MRI IMAGING | Facility: HOSPITAL | Age: 58
Discharge: HOME | End: 2018-10-18
Attending: ORTHOPAEDIC SURGERY | Admitting: ORTHOPAEDIC SURGERY

## 2018-10-18 DIAGNOSIS — M16.11 PRIMARY OSTEOARTHRITIS OF RIGHT HIP: ICD-10-CM

## 2018-10-18 PROCEDURE — 73721 MRI JNT OF LWR EXTRE W/O DYE: CPT

## 2018-10-22 ENCOUNTER — OFFICE VISIT (OUTPATIENT)
Dept: ORTHOPEDIC SURGERY | Facility: CLINIC | Age: 58
End: 2018-10-22

## 2018-10-22 ENCOUNTER — TRANSCRIBE ORDERS (OUTPATIENT)
Dept: LAB | Facility: HOSPITAL | Age: 58
End: 2018-10-22

## 2018-10-22 ENCOUNTER — HOSPITAL ENCOUNTER (OUTPATIENT)
Dept: GENERAL RADIOLOGY | Facility: HOSPITAL | Age: 58
Discharge: HOME | End: 2018-10-22
Attending: ORTHOPAEDIC SURGERY | Admitting: ORTHOPAEDIC SURGERY

## 2018-10-22 ENCOUNTER — LAB (OUTPATIENT)
Dept: LAB | Facility: HOSPITAL | Age: 58
End: 2018-10-22

## 2018-10-22 VITALS — OXYGEN SATURATION: 98 % | BODY MASS INDEX: 20.62 KG/M2 | HEIGHT: 63 IN | HEART RATE: 104 BPM | WEIGHT: 116.4 LBS

## 2018-10-22 DIAGNOSIS — M16.11 PRIMARY OSTEOARTHRITIS OF RIGHT HIP: ICD-10-CM

## 2018-10-22 DIAGNOSIS — M16.11 PRIMARY OSTEOARTHRITIS OF RIGHT HIP: Primary | ICD-10-CM

## 2018-10-22 LAB — CRYSTALS FLD MICRO: NORMAL

## 2018-10-22 PROCEDURE — 25010000002 ROPIVACAINE PER 1 MG: Performed by: ORTHOPAEDIC SURGERY

## 2018-10-22 PROCEDURE — 87070 CULTURE OTHR SPECIMN AEROBIC: CPT

## 2018-10-22 PROCEDURE — 89060 EXAM SYNOVIAL FLUID CRYSTALS: CPT

## 2018-10-22 PROCEDURE — 77002 NEEDLE LOCALIZATION BY XRAY: CPT

## 2018-10-22 PROCEDURE — 87015 SPECIMEN INFECT AGNT CONCNTJ: CPT

## 2018-10-22 PROCEDURE — 99213 OFFICE O/P EST LOW 20 MIN: CPT | Performed by: ORTHOPAEDIC SURGERY

## 2018-10-22 PROCEDURE — 25010000002 TRIAMCINOLONE PER 10 MG: Performed by: ORTHOPAEDIC SURGERY

## 2018-10-22 PROCEDURE — 87205 SMEAR GRAM STAIN: CPT

## 2018-10-22 RX ORDER — TRIAMCINOLONE ACETONIDE 40 MG/ML
40 INJECTION, SUSPENSION INTRA-ARTICULAR; INTRAMUSCULAR ONCE
Status: COMPLETED | OUTPATIENT
Start: 2018-10-22 | End: 2018-10-22

## 2018-10-22 RX ORDER — LIDOCAINE HYDROCHLORIDE 10 MG/ML
10 INJECTION, SOLUTION INFILTRATION; PERINEURAL ONCE
Status: COMPLETED | OUTPATIENT
Start: 2018-10-22 | End: 2018-10-22

## 2018-10-22 RX ORDER — ROPIVACAINE HYDROCHLORIDE 5 MG/ML
1-30 INJECTION, SOLUTION EPIDURAL; INFILTRATION; PERINEURAL ONCE
Status: COMPLETED | OUTPATIENT
Start: 2018-10-22 | End: 2018-10-22

## 2018-10-22 RX ADMIN — TRIAMCINOLONE ACETONIDE 40 MG: 40 INJECTION, SUSPENSION INTRA-ARTICULAR; INTRAMUSCULAR at 15:09

## 2018-10-22 RX ADMIN — ROPIVACAINE HYDROCHLORIDE 5 ML: 5 INJECTION, SOLUTION EPIDURAL; INFILTRATION; PERINEURAL at 15:11

## 2018-10-22 RX ADMIN — LIDOCAINE HYDROCHLORIDE 10 ML: 10 INJECTION, SOLUTION INFILTRATION; PERINEURAL at 15:09

## 2018-10-22 NOTE — PROGRESS NOTES
[facility] Note    Subjective     Chief Complaint   Patient presents with   • Right Hip - Follow-up     Primary Osteoarthritis of Right Hip  Post MRI        HPI    Yeni Olivo is a 58 y.o. female.  She follows up today for the MRI results of her right hip.  No new place today.  Continues to have worsening pain in the hip, with spasms.  The pain is aching, burning, stabbing and shooting.  It worsens with activities.  No history of trauma.  The symptoms are present for 4 weeks now.      Patient Active Problem List   Diagnosis   • CTS (carpal tunnel syndrome)     Past Medical History:   Diagnosis Date   • Breast cancer (CMS/HCC)      LEFT    • Drug therapy    • Radiation       Past Surgical History:   Procedure Laterality Date   • BREAST BIOPSY     •  SECTION     • HYSTERECTOMY     • MASTECTOMY      BILATERAL      Family History   Problem Relation Age of Onset   • COPD Mother    • No Known Problems Father    • No Known Problems Sister    • No Known Problems Brother    • No Known Problems Daughter    • No Known Problems Son    • Stroke Maternal Grandmother    • No Known Problems Paternal Grandmother    • No Known Problems Maternal Aunt    • No Known Problems Paternal Aunt      Social History     Social History   • Marital status:      Spouse name: N/A   • Number of children: N/A   • Years of education: N/A     Occupational History   • Not on file.     Social History Main Topics   • Smoking status: Current Every Day Smoker     Packs/day: 1.00     Years: 30.00     Types: Cigarettes   • Smokeless tobacco: Never Used   • Alcohol use No   • Drug use: No   • Sexual activity: Defer     Other Topics Concern   • Not on file     Social History Narrative   • No narrative on file      Current Outpatient Prescriptions on File Prior to Visit   Medication Sig Dispense Refill   • acyclovir (ZOVIRAX) 400 MG tablet Take 400 mg by mouth 2 (Two) Times a Day. Take no more than 5 doses a  day.     • acyclovir (ZOVIRAX) 400 MG tablet Take 1 tablet by mouth 2 (Two) Times a Day. Take no more than 5 doses a day. 180 tablet 1   • acyclovir (ZOVIRAX) 400 MG tablet Take 1 tablet by mouth 2 (Two) Times a Day. Take no more than 5 doses a day. 180 tablet 1   • ALPRAZolam (XANAX) 0.5 MG tablet Take 0.5 mg by mouth 2 (Two) Times a Day As Needed for Anxiety.     • ALPRAZolam (XANAX) 0.5 MG tablet Take 2 tablets by mouth 2 (Two) Times a Day. 360 tablet 0   • cefdinir (OMNICEF) 300 MG capsule Take 1 capsule by mouth 2 (Two) Times a Day for 7 days 14 capsule 0   • Chlorhexidine Gluconate 4 % solution Shower each day with solution for 5 days beginning 5 days before surgery. 237 mL 0   • hepatitis A (HAVRIX) 1440 EL U/ML vaccine Inject 1 mL into the appropriate muscle as directed by prescriber. Repeat in 6 months 1 mL 1   • HYDROcodone-acetaminophen (NORCO)  MG per tablet Take 1 tablet by mouth Every 4 (Four) Hours As Needed for pain 25 tablet 0   • meloxicam (MOBIC) 7.5 MG tablet Take 1 tablet by mouth Daily with food 90 tablet 0   • Multiple Vitamins-Minerals (MULTIVITAMIN ADULT PO) Take  by mouth Daily.     • ondansetron ODT (ZOFRAN-ODT) 4 MG disintegrating tablet Dissolve 1 tablet by mouth Every 4 (Four) Hours As Needed for nausea and vomiting 10 tablet 0   • raNITIdine (ZANTAC) 75 MG tablet Take 50 mg by mouth Daily.     • venlafaxine XR (EFFEXOR-XR) 150 MG 24 hr capsule Take 1 capsule by mouth Daily. 90 capsule 1     No current facility-administered medications on file prior to visit.       Allergies   Allergen Reactions   • Codeine Nausea Only   • Metoclopramide Unknown (See Comments)   • Penicillins Unknown (See Comments)        Review of Systems   Constitutional: Negative for activity change, appetite change, chills, diaphoresis, fatigue, fever and unexpected weight change.   HENT: Negative for congestion, dental problem, drooling, ear discharge, ear pain, facial swelling, hearing loss, mouth sores,  "nosebleeds, postnasal drip, rhinorrhea, sinus pressure, sneezing, sore throat, tinnitus, trouble swallowing and voice change.    Eyes: Negative for photophobia, pain, discharge, redness, itching and visual disturbance.   Respiratory: Negative for apnea, cough, choking, chest tightness, shortness of breath, wheezing and stridor.    Cardiovascular: Negative for chest pain, palpitations and leg swelling.   Gastrointestinal: Negative for abdominal distention, abdominal pain, anal bleeding, blood in stool, constipation, diarrhea, nausea, rectal pain and vomiting.   Endocrine: Negative for cold intolerance, heat intolerance, polydipsia, polyphagia and polyuria.   Genitourinary: Negative for decreased urine volume, difficulty urinating, dysuria, enuresis, flank pain, frequency, genital sores, hematuria and urgency.   Musculoskeletal: Positive for joint swelling. Negative for arthralgias, back pain, gait problem, myalgias, neck pain and neck stiffness.   Skin: Negative for color change, pallor, rash and wound.   Allergic/Immunologic: Negative for environmental allergies, food allergies and immunocompromised state.   Neurological: Negative for dizziness, tremors, seizures, syncope, facial asymmetry, speech difficulty, weakness, light-headedness, numbness and headaches.   Hematological: Negative for adenopathy. Does not bruise/bleed easily.   Psychiatric/Behavioral: Negative for agitation, behavioral problems, confusion, decreased concentration, dysphoric mood, hallucinations, self-injury, sleep disturbance and suicidal ideas. The patient is not nervous/anxious and is not hyperactive.         Objective      Physical Exam  Pulse 104   Ht 161 cm (63.39\")   Wt 52.8 kg (116 lb 6.5 oz)   SpO2 98%   BMI 20.37 kg/m²     Body mass index is 20.37 kg/m².    General:   Mental Status:  Alert   Appearance: Cooperative, in no acute distress   Build and Nutrition: Well-nourished and well developed female   Orientation: Alert and " oriented to person, place and time   Posture: Normal   Gait: Slow and cautious, but weightbearing on the right lower extremity with minimal difficulty    Lower Extremity:   Right Hip:    Swelling:  None    Crepitus:  None    Range of motion:  External Rotation: 30°       Internal Rotation: 30°       Flexion:  90°       Extension:  0°    Deformities:  None  Functional testing: Positive Stinchfield    No leg length discrepancy        Imaging/Studies  EXAMINATION: MRI HIP RIGHT WO CONTRAST - 10/21/2018     INDICATION:  M16.11-Unilateral primary osteoarthritis, right hip.     TECHNIQUE: Axial and coronal T1-T2 proton density and STIR images of the  pelvis and hips, small field-of-view sagittal T2 fat-sat and coronal T2  true FISP images of the right hip.     COMPARISON: Right hip plain films 10/17/2018.     FINDINGS: Patient history indicates right hip pain, muscle spasms,  symptoms for approximately 6 weeks. History of breast cancer in 2006.  Plain film report indicates mild bony degenerative changes but no acute  abnormality. Additional history indicates pain worse with activity,  sitting, or standing and not improved with anti-inflammatories.  Worsening pain, evaluate for possible avascular necrosis.     Marrow signal in the pelvic bones and hips appears within normal limits  on all sequences. In particular, there are no changes of the proximal  femurs to suggest a vascular necrosis. There is no evidence of fracture  or stress reaction. Femoral heads are normally rounded. There is no  evidence of significant muscle edema, evidence of hematoma or asymmetric  muscle atrophy. No intrapelvic soft tissue mass or inflammatory change  is seen. No evidence of gluteal iliopsoas or hamstring tendinopathy is  seen.     Small field-of-view images show normal acetabular labral morphology and  signal. There is only a trace amount of joint fluid well within normal  limits. Articular cartilage appears well maintained. Small  marginal  osteophytes are again noted.     IMPRESSION:  Minor bony degenerative changes of the right hip. No other  significant pathology is seen. In particular, no evidence of avascular  necrosis or stress reaction.     DICTATED:   10/21/2018  EDITED/ls :   10/21/2018       Assessment and Plan     Yeni was seen today for follow-up.    Diagnoses and all orders for this visit:    Primary osteoarthritis of right hip  -     FL Guided Aspiration Joint; Future        I reviewed my findings with patient today.  She continues to be bothered with right hip pain, and her MRI suggests degeneration in the hip.  I have recommended a trial of an intra-articular injection, and we will schedule that for her surgery radiology, as my schedule cannot accommodate this in short order.  If fluid is encountered, I recommended sending it for analysis.  Otherwise, local anesthetic and steroid injection for both diagnostic and therapeutic purposes.  See her back 4 weeks after the injection, but sooner for any problems.  If she has no relief with the intra-articular injection, another source of pain should be considered.    Return in about 4 weeks (around 11/19/2018) for After Hip Injection.      Medical Decision Making  Management Options : prescription/IM medicine  Data/Risk: independent visualization of imaging, lab tests, or EMG/NCV      Reginaldo Virk MD  10/22/18  11:35 AM   negative...

## 2018-10-23 ENCOUNTER — TELEPHONE (OUTPATIENT)
Dept: ORTHOPEDIC SURGERY | Facility: CLINIC | Age: 58
End: 2018-10-23

## 2018-10-23 NOTE — TELEPHONE ENCOUNTER
The patient is calling to let the office know that her hip injection she had yesterday is not working. She is still in a lot of pain. She can be called back [phone].

## 2018-10-23 NOTE — TELEPHONE ENCOUNTER
Called patient back- she is concerned because the injection has not helped yet. She was under the impression that it would help in 1-2 days. I went over with her about how the injections can ususally take 4-7 days to take full effect if they are going to work. She understood. I told her to take it easy and give it through the weekend to start helping. She will call back first of the week if she is still having no relief. She did also state that she will be sneding in some [facility] paperwork due to having to miss work.     Haylee

## 2018-10-24 ENCOUNTER — HOSPITAL ENCOUNTER (EMERGENCY)
Facility: HOSPITAL | Age: 58
Discharge: HOME | End: 2018-10-24
Attending: EMERGENCY MEDICINE | Admitting: EMERGENCY MEDICINE

## 2018-10-24 VITALS
RESPIRATION RATE: 18 BRPM | BODY MASS INDEX: 20.91 KG/M2 | SYSTOLIC BLOOD PRESSURE: 120 MMHG | HEIGHT: 63 IN | HEART RATE: 92 BPM | DIASTOLIC BLOOD PRESSURE: 76 MMHG | WEIGHT: 118 LBS | TEMPERATURE: 98.2 F | OXYGEN SATURATION: 95 %

## 2018-10-24 DIAGNOSIS — M51.26 HNP (HERNIATED NUCLEUS PULPOSUS), LUMBAR: ICD-10-CM

## 2018-10-24 DIAGNOSIS — M54.50 ACUTE RIGHT-SIDED LOW BACK PAIN WITHOUT SCIATICA: Primary | ICD-10-CM

## 2018-10-24 DIAGNOSIS — M79.604 LEG PAIN, ANTERIOR, RIGHT: ICD-10-CM

## 2018-10-24 DIAGNOSIS — M54.16 LUMBAR RADICULOPATHY: Primary | ICD-10-CM

## 2018-10-24 PROCEDURE — 25010000002 KETOROLAC TROMETHAMINE PER 15 MG: Performed by: PHYSICIAN ASSISTANT

## 2018-10-24 PROCEDURE — 96372 THER/PROPH/DIAG INJ SC/IM: CPT

## 2018-10-24 PROCEDURE — 99283 EMERGENCY DEPT VISIT LOW MDM: CPT

## 2018-10-24 RX ORDER — KETOROLAC TROMETHAMINE 30 MG/ML
30 INJECTION, SOLUTION INTRAMUSCULAR; INTRAVENOUS ONCE
Status: COMPLETED | OUTPATIENT
Start: 2018-10-24 | End: 2018-10-24

## 2018-10-24 RX ORDER — METHYLPREDNISOLONE 4 MG/1
TABLET ORAL
Qty: 21 TABLET | Refills: 0 | Status: SHIPPED | OUTPATIENT
Start: 2018-10-24 | End: 2018-11-06

## 2018-10-24 RX ORDER — CYCLOBENZAPRINE HCL 10 MG
10 TABLET ORAL 3 TIMES DAILY PRN
Qty: 12 TABLET | Refills: 0 | Status: SHIPPED | OUTPATIENT
Start: 2018-10-24 | End: 2018-11-28

## 2018-10-24 RX ADMIN — KETOROLAC TROMETHAMINE 30 MG: 30 INJECTION, SOLUTION INTRAMUSCULAR at 11:20

## 2018-10-24 NOTE — ED PROVIDER NOTES
Subjective   Patient is complaining of pain into her right buttock and right hip down into her leg.  Patient reports she's been seeing Dr. Virk for this.  Said occurred about 3 weeks ago she been sanding some drywall was kind of bent over in different positions and started having pain.  She seen Dr. Virk for shoulder problems and went to see him for about the hip.  He ordered an MRI which had a pretty unimpressive finding.  And so and actually injected hip about 2 days ago.  Patient reports the pain is not changed it is not improved.  According to Dr. Virk's note in the chart the injection did not help he did not believe that this had a hip etiology.  She reports the pain radiates down her leg down the anterior portion she's had no loss of bowel or bladder control.  She's had no burning with urination frequency urgency or hematuria.  Patient reports she's noted no prior history of back problems but does had pain in the right SI as discussed earlier.  Patient denies any rash or lesions to this area.  Pain is more of a burning and then shooting type pain down her leg.  Patient states that is exacerbated by bending and twisting and lifting.  Seems to be alleviated a little bit by rest.  She was concerned that the hip procedure had not helped and had other etiology has Dr. Virk's note had alluded.  Said no fall no trauma.  She does not want x-rays just seeking some symptomatic relief until she can get in to see her primary care doctor for referral to physical therapy.        History provided by:  Patient   used: Yes    Back Pain   Location:  Lumbar spine  Quality:  Burning and shooting  Radiates to:  R posterior upper leg and R thigh  Pain severity:  Severe  Pain is:  Same all the time  Onset quality:  Gradual  Duration:  3 weeks  Timing:  Constant  Progression:  Waxing and waning  Chronicity:  New  Context: twisting    Relieved by:  Being still  Worsened by:  Ambulation, bending, twisting and  sitting  Ineffective treatments: hip injection and MRI hip venita.  Associated symptoms: no abdominal pain, no bladder incontinence, no bowel incontinence, no dysuria, no fever, no numbness and no paresthesias        Review of Systems   Constitutional: Negative for chills and fever.   Gastrointestinal: Negative for abdominal pain, bowel incontinence, diarrhea, nausea and vomiting.   Genitourinary: Negative for bladder incontinence, dysuria, hematuria and urgency.   Musculoskeletal: Positive for back pain.   Skin: Negative for pallor and rash.   Neurological: Negative for numbness and paresthesias.   Psychiatric/Behavioral: Negative.    All other systems reviewed and are negative.      Past Medical History:   Diagnosis Date   • Breast cancer (CMS/[facility])     2006 LEFT    • Drug therapy    • Radiation        Allergies   Allergen Reactions   • Codeine Nausea Only   • Metoclopramide Unknown (See Comments)   • Penicillins Unknown (See Comments)       Past Surgical History:   Procedure Laterality Date   • BREAST BIOPSY     •  SECTION     • HYSTERECTOMY     • MASTECTOMY      BILATERAL       Family History   Problem Relation Age of Onset   • COPD Mother    • No Known Problems Father    • No Known Problems Sister    • No Known Problems Brother    • No Known Problems Daughter    • No Known Problems Son    • Stroke Maternal Grandmother    • No Known Problems Paternal Grandmother    • No Known Problems Maternal Aunt    • No Known Problems Paternal Aunt        Social History     Social History   • Marital status:      Social History Main Topics   • Smoking status: Current Every Day Smoker     Packs/day: 1.00     Years: 30.00     Types: Cigarettes   • Smokeless tobacco: Never Used   • Alcohol use No   • Drug use: No   • Sexual activity: Defer     Other Topics Concern   • Not on file           Objective   Physical Exam   Constitutional: She is oriented to person, place, and time. She appears well-developed and  well-nourished.   HENT:   Head: Normocephalic and atraumatic.   Right Ear: External ear normal.   Left Ear: External ear normal.   Nose: Nose normal.   Mouth/Throat: Oropharynx is clear and moist.   Eyes: Pupils are equal, round, and reactive to light. Conjunctivae and EOM are normal. No scleral icterus.   Neck: Normal range of motion. No thyromegaly present.   Musculoskeletal: Normal range of motion.   Tender in the right SI there is no rash no lesions.  Positive straight leg raise on the right negative on the left.  Strength 5 over 5 bilateral lower extremities.  Distal pulses are strong and equal sensation was intact.   Lymphadenopathy:     She has no cervical adenopathy.   Neurological: She is alert and oriented to person, place, and time. She has normal reflexes. She displays normal reflexes. No cranial nerve deficit. Coordination normal.   Skin: Skin is warm and dry.   Psychiatric: She has a normal mood and affect. Her behavior is normal. Judgment and thought content normal.   Nursing note and vitals reviewed.      Procedures           ED Course  ED Course as of Oct 25 0737   Wed Oct 24, 2018   1059 Discussed findings with the patient here.  She inquired about getting an MRI to the emergency department.  Advised her of the findings for acute surgical emergency that this would need to be done through her primary care doctor as an outpatient.  Patient agrees with this.  Given a shot of Toradol follow-up.  [MANUEL]      ED Course User Index  [MANUEL] Gian King PA                  [facility]        Final diagnoses:   Lumbar radiculopathy            Gian King PA  10/25/18 [id number]

## 2018-10-29 ENCOUNTER — OFFICE VISIT (OUTPATIENT)
Dept: NEUROSURGERY | Facility: CLINIC | Age: 58
End: 2018-10-29

## 2018-10-29 ENCOUNTER — HOSPITAL ENCOUNTER (OUTPATIENT)
Dept: MRI IMAGING | Facility: HOSPITAL | Age: 58
Discharge: HOME | End: 2018-10-29
Admitting: PHYSICIAN ASSISTANT

## 2018-10-29 ENCOUNTER — DOCUMENTATION (OUTPATIENT)
Dept: NEUROSURGERY | Facility: CLINIC | Age: 58
End: 2018-10-29

## 2018-10-29 VITALS
TEMPERATURE: 99.1 F | DIASTOLIC BLOOD PRESSURE: 76 MMHG | HEIGHT: 63 IN | WEIGHT: 114 LBS | BODY MASS INDEX: 20.2 KG/M2 | SYSTOLIC BLOOD PRESSURE: 100 MMHG

## 2018-10-29 DIAGNOSIS — M54.50 CHRONIC BILATERAL LOW BACK PAIN WITHOUT SCIATICA: ICD-10-CM

## 2018-10-29 DIAGNOSIS — M51.26 HNP (HERNIATED NUCLEUS PULPOSUS), LUMBAR: ICD-10-CM

## 2018-10-29 DIAGNOSIS — M51.26 HNP (HERNIATED NUCLEUS PULPOSUS), LUMBAR: Primary | ICD-10-CM

## 2018-10-29 DIAGNOSIS — G89.29 CHRONIC BILATERAL LOW BACK PAIN WITHOUT SCIATICA: ICD-10-CM

## 2018-10-29 DIAGNOSIS — M79.604 LEG PAIN, ANTERIOR, RIGHT: ICD-10-CM

## 2018-10-29 DIAGNOSIS — M51.36 DDD (DEGENERATIVE DISC DISEASE), LUMBAR: ICD-10-CM

## 2018-10-29 DIAGNOSIS — M54.50 ACUTE RIGHT-SIDED LOW BACK PAIN WITHOUT SCIATICA: ICD-10-CM

## 2018-10-29 DIAGNOSIS — G56.01 CARPAL TUNNEL SYNDROME OF RIGHT WRIST: ICD-10-CM

## 2018-10-29 PROBLEM — M51.369 DDD (DEGENERATIVE DISC DISEASE), LUMBAR: Status: ACTIVE | Noted: 2018-10-29

## 2018-10-29 LAB
BACTERIA FLD CULT: NORMAL
GRAM STN SPEC: NORMAL
GRAM STN SPEC: NORMAL

## 2018-10-29 PROCEDURE — 99214 OFFICE O/P EST MOD 30 MIN: CPT | Performed by: PHYSICIAN ASSISTANT

## 2018-10-29 PROCEDURE — 72148 MRI LUMBAR SPINE W/O DYE: CPT

## 2018-10-29 NOTE — PROGRESS NOTES
Soma 350 mg tid with 1 RF called in to [facility] out patient pharmacy perChristianne BLANTON.

## 2018-10-31 ENCOUNTER — TELEPHONE (OUTPATIENT)
Dept: PAIN MEDICINE | Facility: CLINIC | Age: 58
End: 2018-10-31

## 2018-11-01 NOTE — PROGRESS NOTES
"Chief Complaint: \"Pain on the right side of my lower back and my right thigh.\"       History of Present Illness:   Patient: Ms. Yeni Olivo, 58 y.o. female   Referring physician: Tri Callejas PA-C  Reason for referral: Consultation for intractable chronic lower back pain and right anterior thigh pain  Pain history: Patient reports a a long standing history of lower back pain, which began without incident. Pain has progressed in intensity over the past six weeks  Pain description: constant pain with intermittent exacerbation, described as aching, sharp, stabbing and throbbing sensation.   Radiation of pain: The lower back pain radiates into the right hip, the anterior aspect right thigh, and down to the right knee.  Pain intensity today: 7/10  Average pain intensity last week: 10/10  Pain intensity ranges from: 5/10 to 10/10  Aggravating factors: Pain increases with twisting, bending, sitting longer than 5-10 minutes, standing longer than 5 minutes, ambulating more than 5 minutes, and lying down.  Alleviating factors: Pain decreases with heat, analgesics, and ice.     Associated symptoms:   Patient reports pain, numbness and weakness in the right lower extremity. Patient denies left-sided symptoms.   Patient denies any new bladder or bowel problems.   Patient denies difficulties with her balance or recent falls.      Review of previous therapies and additional medical records:  Yeni Olivo has already failed the following measures, including:   Conservative measures: oral analgesics, opioids, physical therapy, ice, heat and chiropractic therapy   Interventional measures: Patient under a right hip joint injection with Dr. Reginaldo Virk on 10/22/2018, which did not provide any significant pain relief.  Surgical measures: No history of lumbar spine or hip surgery  Yeni Olivo underwent neurosurgical consultation with Tri Callejas PA-C on 10/29/2018, and was found not to be a surgical " candidate.  Yeni Olivo presents with significant comorbidities including anxiety and depression, history of breast cancer; engaged in treatment.  In terms of current analgesics, Yeni Olivo takes: Soma and Mobic. Patient also takes Effexor, Xanax.  I have reviewed Roland Report #[id number] consistent to medication reconciliation.     Global Pain Scale 11-6-18                  Pain  18                 Feelings  13                 Clinical outcomes  21                 Activities  25                 GPS Total:  77                    Review of Diagnostic Studies:    MRI Lumbar Spine w/o Contrast on 10/29/2018: Multilevel degenerative changes throughout the lumbar spine. Degenerative changes within the inferior endplate of T12 with anterior bridging osteophyte formation at the T12-L1 level. Schmorl's nodes seen in the inferior aspect of the L2, L3, and L4 levels. There is disc space narrowing at the L5-S1 level. Normal signal intensity within the conus. The spinal cord terminates at the L1-L2 disc space. No abnormal mass or fluid collection within the paraspinal muscles. Axial imaging:   L1-L2: broad-based disc bulge creating some mild narrowing of the neuroforamina bilaterally and mild central spinal canal narrowing. Degenerative change within the posterior facets.   L2-L3: central disc protrusion with mass effect on the thecal sac and severe narrowing of the central spinal canal. There is extrusion of disc material behind the inferior aspect of L2. There is severe narrowing of the neuroforamina bilaterally left greater than right. Nerve root compromise is identified.   L3-L4: right paracentral disc protrusion and mass effect on the rightward aspect of the thecal sac and narrowing of the neuroforamina bilaterally. Moderate central spinal canal stenosis.   L4-L5: right paracentral disc protrusion creating mass effect on the right nerve root and narrowing of the right neuroforamina. Mild narrowing of the  central spinal canal.   L5-S1: no significant central spinal canal stenosis or nerve root compromise. No neural foraminal narrowing is identified.  MRI Right Hip w/o Contrast on 10/18/2018: Marrow signal in the pelvic bones and hips appears within normal limits on all sequences. In particular, there are no changes of the proximal femurs to suggest avascular necrosis. There is no evidence of fracture or stress reaction. Femoral heads are normally rounded. There is no evidence of significant muscle edema, evidence of hematoma or asymmetric muscle atrophy. No intrapelvic soft tissue mass or inflammatory change is seen. No evidence of gluteal iliopsoas or hamstring tendinopathy is seen. Small field-of-view images show normal acetabular labral morphology and signal. There is only a trace amount of joint fluid well within normal limits. Articular cartilage appears well maintained. Small marginal osteophytes are again noted.  X-Ray Right Hip & Pelvis on 10/17/2018: Arthritic changes are seen, with spur formation on the femoral head, as well as at the neck region, with no acute bony abnormalities.    Review of Systems   Constitutional: Positive for activity change, appetite change, fatigue and unexpected weight change. Negative for fever.   HENT: Positive for ear pain and hearing loss.    Eyes: Positive for redness.   Cardiovascular: Negative for chest pain.   Gastrointestinal: Negative for abdominal pain.   Genitourinary: Positive for flank pain. Negative for dysuria.   Musculoskeletal: Positive for arthralgias, back pain, gait problem and myalgias.   Neurological: Positive for weakness and numbness. Negative for headaches.   Hematological: Bruises/bleeds easily.   Psychiatric/Behavioral: Positive for decreased concentration and sleep disturbance. The patient is nervous/anxious (depression).    All other systems reviewed and are negative.     Patient Active Problem List   Diagnosis   • CTS (carpal tunnel syndrome)   • DDD  (degenerative disc disease), lumbar   • HNP (herniated nucleus pulposus), lumbar   • Anxiety and depression   • Tobacco abuse counseling   • Current every day smoker   • Lumbar facet arthropathy   • Lumbar disc disease with radiculopathy   • Lumbar foraminal stenosis       Past Medical History:   Diagnosis Date   • Breast cancer (CMS/HCC)     2006 LEFT    • Drug therapy    • Radiation          Past Surgical History:   Procedure Laterality Date   • BREAST BIOPSY     •  SECTION     • HYSTERECTOMY     • MASTECTOMY      BILATERAL   • OTHER SURGICAL HISTORY  2018    stones removed from submandibular gland          Family History   Problem Relation Age of Onset   • COPD Mother    • No Known Problems Father    • No Known Problems Sister    • No Known Problems Brother    • No Known Problems Daughter    • No Known Problems Son    • Stroke Maternal Grandmother    • No Known Problems Paternal Grandmother    • No Known Problems Maternal Aunt    • No Known Problems Paternal Aunt          Social History     Social History   • Marital status:      Social History Main Topics   • Smoking status: Current Every Day Smoker     Packs/day: 1.00     Years: 30.00     Types: Cigarettes   • Smokeless tobacco: Never Used   • Alcohol use No   • Drug use: No   • Sexual activity: Defer     Other Topics Concern   • Not on file           Current Outpatient Prescriptions:   •  acyclovir (ZOVIRAX) 400 MG tablet, Take 1 tablet by mouth 2 (Two) Times a Day. Take no more than 5 doses a day., Disp: 180 tablet, Rfl: 1  •  ALPRAZolam (XANAX) 0.5 MG tablet, Take 0.5 mg by mouth 2 (Two) Times a Day As Needed for Anxiety., Disp: , Rfl:   •  carisoprodol (SOMA) 350 MG tablet, Take 1 tablet by mouth 3 (Three) Times a Day. (Patient taking differently: Take 350 mg by mouth 4 (Four) Times a Day As Needed.), Disp: 45 tablet, Rfl: 1  •  meloxicam (MOBIC) 7.5 MG tablet, Take 1 tablet by mouth Daily with food, Disp: 90 tablet, Rfl: 0  •  Multiple  Vitamins-Minerals (MULTIVITAMIN ADULT PO), Take  by mouth Daily., Disp: , Rfl:   •  raNITIdine (ZANTAC) 75 MG tablet, Take 150 mg by mouth Daily., Disp: , Rfl:   •  venlafaxine XR (EFFEXOR-XR) 150 MG 24 hr capsule, Take 1 capsule by mouth Daily., Disp: 90 capsule, Rfl: 1  •  acyclovir (ZOVIRAX) 400 MG tablet, Take 1 tablet by mouth 2 (Two) Times a Day. Take no more than 5 doses a day., Disp: 180 tablet, Rfl: 1  •  cyclobenzaprine (FLEXERIL) 10 MG tablet, Take 1 tablet by mouth 3 (Three) Times a Day As Needed for Muscle Spasms., Disp: 12 tablet, Rfl: 0  •  hepatitis A (HAVRIX) 1440 EL U/ML vaccine, Inject 1 mL into the appropriate muscle as directed by prescriber. Repeat in 6 months, Disp: 1 mL, Rfl: 1      Allergies   Allergen Reactions   • Codeine Nausea Only   • Metoclopramide Unknown (See Comments)   • Penicillins Unknown (See Comments)   • Talwin [Pentazocine] Other (See Comments)     Oral swelling.        /70 (BP Location: Right arm, Patient Position: Sitting, Cuff Size: Adult)   Pulse 97   Temp 98.3 °F (36.8 °C) (Temporal Artery )   Resp 14   Wt 53.6 kg (118 lb 3.2 oz)   SpO2 99%   BMI 20.94 kg/m²       Physical Exam:  Constitutional: Patient is oriented to person, place, and time. Patient appears well-developed and well-nourished.   HEENT: Head: Normocephalic and atraumatic. Eyes: Conjunctivae and lids are normal. Pupils: Equal, round, reactive to light.   Neck: Trachea normal. Neck supple. No JVD present.   Pulmonary Respiratory effort: No increased work of breathing or signs of respiratory distress. Auscultation of lungs: Clear to auscultation.   Cardiovascular Auscultation of heart: Normal rate and rhythm, normal S1 and S2, no murmurs.   Peripheral vascular exam: Femoral: right 2+, left 2+. Posterior tibialis: right 2+ and left 2+. Dorsalis pedis: right 2+ and left 2+. No edema.   Abdomen: The abdomen was soft and nontender. Bowel sounds were normal.   Musculoskeletal   Gait and station: Gait  evaluation demonstrated a normal gait   Lumbar spine: Passive and active range of motion are almost full and without pain. Extension, flexion, lateral flexion, rotation of the lumbar spine did not increase or reproduce pain. Lumbar facet joint loading maneuvers are negative.   Mitchell and Gaenslen's tests are negative   Piriformis maneuvers are negative   Palpation of the bilateral ischial tuberosities, unrevealing   Palpation of the bilateral greater trochanter, unrevealing   Examination of the Iliotibial band: unrevealing   Hip joints: The range of motion of the hip joints is full and without pain   Neurological: Patient is alert and oriented to person, place, and time. Speech: speech is normal. Cortical function: Normal mental status.   Cranial nerves: Cranial nerves 2-12 intact.   Reflex Scores:  Right patellar: 2+  Left patellar: 2+  Right Achilles: 1+  Left Achilles: 1+  Motor strength: 5/5  Motor Tone: normal tone.   Involuntary movements: none.   Superficial/Primitive Reflexes: primitive reflexes were absent.   Right Coels: absent  Left Coles: absent  Right ankle clonus: absent  Left ankle clonus: absent   Negative long tract signs. Straight leg raising test is negative. Femoral stretch sign is positive on the right, negative on the left.   Sensation: No sensory loss. Sensory exam: intact to light touch, intact pain and temperature sensation, intact vibration sensation and normal proprioception.   Coordination: Normal finger to nose and heel to shin. Normal balance and negative. Romberg's sign negative.   Skin and subcutaneous tissue: Skin is warm and intact. No rash noted. No cyanosis.   Psychiatric: Judgment and insight: Normal. Orientation to person, place and time: Normal. Recent and remote memory: Intact. Mood and affect: Normal.     ASSESSMENT:   1. Lumbar disc disease with radiculopathy    2. Lumbar foraminal stenosis    3. DDD (degenerative disc disease), lumbar    4. Lumbar facet arthropathy     5. Current every day smoker    6. Tobacco abuse counseling    7. Anxiety and depression        PLAN/MEDICAL DECISION MAKING: I had a lengthy conversation with Ms. Yeni Olivo regarding her chronic pain condition and potential therapeutic options including risks, benefits, alternative therapies, to name a few. Patient has failed to obtain pain relief with conservative measures, as referenced above. Patient presents with a long-standing history of lower back pain, which got significantly worse 6 weeks ago without incident. At that point, she started experiencing right hip and anterior thigh pain. Patient was seen by an orthopedic surgeon and received a steroid injection into the right hip without any improvement. She has also recently finished a Medrol Dosepak without relief. MRI of the right hip was negative. MRI of the lumbar spine revealed multilevel degenerative disc changes through out the lumbar spine including T12.  There are multilevel disc bulges. Right paracentral disc protrusions at L3-L4 and L4-L5. Rightward foraminal stenosis at L4-L5 and L3-L4 and leftward foraminal stenosis at L1-L2 and L2-L3. There is facet hypertrophy. There are degenerative endplate changes throughout the lumbar spine. I have reviewed all available patient's medical records as well as previous therapies as referenced above. Therefore, I have proposed the following plan:  1. Pharmacological measures: Reviewed. Discussed.   A. Patient takes Soma, and Mobic. Patient also takes Effexor, Xanax.  B. Trial with Rheumate one tablet daily  C. Start pyridoxine 25 mg one tablet by mouth three times daily  D. Trial with gabapentin 100 mg three times per day   Screening and compliance with controlled substances:  Patient has completed a SOAPP questionnaire and ORT questionnaires (revealing low risk). Roland reports has been reviewed and appropriate. Patient has signed a consent for treatment with controlled substances after reviewing  the document and verbalizing full understanding of the risks, benefits, alternatives, and consequences of compliance and adherence with treatment and comprehensive plan of care. Patient received in hand a copy of this document.  2. Interventional pain management measures: Patient will be scheduled for diagnostic and therapeutic right L3-L4 and right L4-L5 transforaminal epidural steroid injections. We may repeat another epidural depending on patient's outcome.  Patient will follow-up with Tri Callejas PA-C thereafter. If patient fails to improve, then, Tri has discussed the possibility of lumbar myelogram and EMG/NCV  3. Long-term rehabilitation efforts:  A. The patient does not have a history of falls. I did complete a risk assessment for falls.  B. Patient will start a comprehensive physical therapy program for water therapy, therapeutic exercise, core strengthening, gait and balance training, neurodynamics, myofascial release, cupping and dry needling once pain is under control  C. Start an exercise program such as yoga and water therapy  D. Contrast therapy: Apply ice-packs for 15-20 minutes, followed by heating pads for 15-20 minutes to affected area   E. Patient has declined a referral to Dr. Issa Franco for cognitive behavioral therapy.  4. Patient has been screened for tobacco use: Current tobacco user.  Smoking Cessation: I have advised the patient at length regarding the long-term deleterious effects of smoking and have provided the patient lifestyle modification strategies to facilitate smoking cessation. For instance, I have instructed the patient to delay the time that he lights his first cigarette in the morning from 1 hour to 2 hours and to continue pushing back 30-60 minutes, if possible, every day for the rest of the week. We have also discussed pharmacological interventions in addition to participation in support groups, individual counseling, to name a few to facilitate smoking/nicotine  cessation. I spent approximately 5 minutes advising the patient.   5. The patient and her family have been instructed to contact my office with any questions or difficulties. The patient understands the plan and agrees to proceed accordingly.         Patient Care Team:  Agnes Armstrong MD as PCP - General  Agnes Armstrong MD as PCP - Family Medicine  Blu Peacock PA-C as Physician Assistant (Neurosurgery)  Tri Callejas PA-C as Physician Assistant (Neurosurgery)  Reginaldo Virk MD as Consulting Physician (Orthopedic Surgery)  Mat Wiggins MD as Consulting Physician (Gastroenterology)  Santiago Duncan MD as Consulting Physician (Otolaryngology)     No orders of the defined types were placed in this encounter.        Future Appointments  Date Time Provider Department Center   1/9/2019 3:50 PM Reginaldo Virk MD [facility] None         Raul Dill MD     EMR Dragon/Transcription disclaimer:  Much of this encounter note is an electronic transcription of spoken language to printed text. Electronic transcription of spoken language may permit erroneous, or at times, nonsensical words or phrases to be inadvertently transcribed. Although I have reviewed the note for such errors, some may still exist.

## 2018-11-06 ENCOUNTER — OFFICE VISIT (OUTPATIENT)
Dept: PAIN MEDICINE | Facility: CLINIC | Age: 58
End: 2018-11-06

## 2018-11-06 VITALS
HEART RATE: 97 BPM | TEMPERATURE: 98.3 F | RESPIRATION RATE: 14 BRPM | WEIGHT: 118.2 LBS | DIASTOLIC BLOOD PRESSURE: 70 MMHG | SYSTOLIC BLOOD PRESSURE: 142 MMHG | BODY MASS INDEX: 20.94 KG/M2 | OXYGEN SATURATION: 99 %

## 2018-11-06 DIAGNOSIS — F32.A ANXIETY AND DEPRESSION: ICD-10-CM

## 2018-11-06 DIAGNOSIS — F17.200 CURRENT EVERY DAY SMOKER: ICD-10-CM

## 2018-11-06 DIAGNOSIS — M51.36 DDD (DEGENERATIVE DISC DISEASE), LUMBAR: ICD-10-CM

## 2018-11-06 DIAGNOSIS — M51.26 HNP (HERNIATED NUCLEUS PULPOSUS), LUMBAR: ICD-10-CM

## 2018-11-06 DIAGNOSIS — Z71.6 TOBACCO ABUSE COUNSELING: ICD-10-CM

## 2018-11-06 DIAGNOSIS — M48.061 LUMBAR FORAMINAL STENOSIS: ICD-10-CM

## 2018-11-06 DIAGNOSIS — M47.816 LUMBAR FACET ARTHROPATHY: ICD-10-CM

## 2018-11-06 DIAGNOSIS — F41.9 ANXIETY AND DEPRESSION: ICD-10-CM

## 2018-11-06 DIAGNOSIS — M51.16 LUMBAR DISC DISEASE WITH RADICULOPATHY: Primary | ICD-10-CM

## 2018-11-06 DIAGNOSIS — M51.16 LUMBAR DISC DISEASE WITH RADICULOPATHY: ICD-10-CM

## 2018-11-06 PROCEDURE — 99203 OFFICE O/P NEW LOW 30 MIN: CPT | Performed by: ANESTHESIOLOGY

## 2018-11-06 PROCEDURE — 99406 BEHAV CHNG SMOKING 3-10 MIN: CPT | Performed by: ANESTHESIOLOGY

## 2018-11-06 RX ORDER — ME-TETRAHYDROFOLATE/B12/HRB236 1-1-500 MG
CAPSULE ORAL
Qty: 30 CAPSULE | Refills: 5 | Status: SHIPPED | OUTPATIENT
Start: 2018-11-06 | End: 2019-01-09

## 2018-11-06 RX ORDER — GABAPENTIN 100 MG/1
100 CAPSULE ORAL 3 TIMES DAILY
Qty: 90 CAPSULE | Refills: 5 | OUTPATIENT
Start: 2018-11-06 | End: 2018-11-28 | Stop reason: SDUPTHER

## 2018-11-06 RX ORDER — PYRIDOXINE HCL (VITAMIN B6) 25 MG
25 TABLET ORAL 3 TIMES DAILY
Qty: 90 TABLET | Refills: 5 | Status: SHIPPED | OUTPATIENT
Start: 2018-11-06 | End: 2019-01-09

## 2018-11-07 ENCOUNTER — OUTSIDE FACILITY SERVICE (OUTPATIENT)
Dept: PAIN MEDICINE | Facility: CLINIC | Age: 58
End: 2018-11-07

## 2018-11-07 PROCEDURE — 64484 NJX AA&/STRD TFRM EPI L/S EA: CPT | Performed by: ANESTHESIOLOGY

## 2018-11-07 PROCEDURE — 99152 MOD SED SAME PHYS/QHP 5/>YRS: CPT | Performed by: ANESTHESIOLOGY

## 2018-11-07 PROCEDURE — 64483 NJX AA&/STRD TFRM EPI L/S 1: CPT | Performed by: ANESTHESIOLOGY

## 2018-11-08 ENCOUNTER — TELEPHONE (OUTPATIENT)
Dept: PAIN MEDICINE | Facility: CLINIC | Age: 58
End: 2018-11-08

## 2018-11-08 NOTE — TELEPHONE ENCOUNTER
Patient had dx/tx right L3-L4 and right L4-L5 TFESI on 11/07/2018. Patient reports that she is doing good. Still has some pain but has decreased, no spasms. Does have some soreness and has been using ice

## 2018-11-20 NOTE — PROGRESS NOTES
"Chief Complaint: \"I'm doing great after the epidurals.\"     History of Present Illness:   Patient: Ms. Yeni Olivo, 58 y.o. female with a history of chronic lower back and right thigh pain, which began without incident.  She presents today for post-procedure follow-up.  Patient was last seen on 11/07/2018 for right L3-L4 and right L4-L5 transforaminal epidural steroid injections and experienced 95% pain relief that is ongoing.  Patient has not started PT yet, but plans to start soon.   Pain description: Previously constant pain with intermittent exacerbation, described as aching, sharp, stabbing and throbbing sensation.   Radiation of pain: The lower back pain radiated into the right hip, the anterior aspect right thigh, and down to the right knee.  Pain intensity today: 1/10  Average pain intensity last week: 1/10  Pain intensity ranges from: 1/10 to 6/10  Aggravating factors: Pain increased with twisting, bending, sitting longer than 5-10 minutes, standing longer than 5 minutes, ambulating more than 5 minutes, and lying down.  Alleviating factors: Pain decreases with heat, analgesics, and ice.     Associated symptoms:   Patient denies pain, numbness and weakness in the right lower extremity. Patient denies left-sided symptoms.   Patient denies any new bladder or bowel problems.   Patient denies difficulties with her balance or recent falls.      Review of previous therapies and additional medical records:  Yeni Olivo has already failed the following measures, including:   Conservative measures: oral analgesics, opioids, physical therapy, ice, heat and chiropractic therapy   Interventional measures: As referenced above.  Right hip joint injection with Dr. Reginaldo Virk on 10/22/2018 without relief.  Surgical measures: No history of lumbar spine or hip surgery  Yeni Olivo underwent neurosurgical consultation with Tri Callejas PA-C on 10/29/2018, and was found not to be a surgical " candidate.  Yeni Olivo presents with significant comorbidities including anxiety and depression, history of breast cancer; engaged in treatment.  In terms of current analgesics, Yeni Olivo takes: gabapentin and Aleve   I have reviewed Roland Report #[id number] consistent to medication reconciliation.     Global Pain Scale 11-6-18 11-               Pain  18  8               Feelings  13  6               Clinical outcomes  21  2               Activities  25  0               GPS Total:  77  16                  Review of Diagnostic Studies:    MRI Lumbar Spine w/o Contrast- 10/29/2018: Severe central spinal canal stenosis at the L2-L3 level.  Central disc protrusion with extrusion of disc material behind the inferior aspect of L2. There is mass effect on the nerve roots.  Narrowing of the neuroforamina bilaterally. The other levels demonstrate degenerative changes as described above.  MRI Right Hip w/o Contrast- 10/18/2018: Minor bony degenerative changes of the right hip. No other  significant pathology is seen. In particular, no evidence of avascular necrosis or stress reaction.  X-Ray Right Hip & Pelvis- 10/17/2018: Arthritic changes are seen, with spur formation on the femoral head, as well as at the neck region, with no acute bony abnormalities.    Review of Systems   Constitutional: Positive for activity change, appetite change, fatigue and unexpected weight change. Negative for fever.   HENT: Positive for ear pain and hearing loss.    Eyes: Positive for redness.   Cardiovascular: Negative for chest pain.   Gastrointestinal: Negative for abdominal pain.   Genitourinary: Positive for flank pain. Negative for dysuria.   Musculoskeletal: Positive for arthralgias, back pain, gait problem and myalgias.   Neurological: Positive for weakness and numbness. Negative for headaches.   Hematological: Bruises/bleeds easily.   Psychiatric/Behavioral: Positive for decreased concentration and sleep  disturbance. The patient is nervous/anxious (depression).    All other systems reviewed and are negative.     Patient Active Problem List   Diagnosis   • CTS (carpal tunnel syndrome)   • DDD (degenerative disc disease), lumbar   • HNP (herniated nucleus pulposus), lumbar   • Anxiety and depression   • Tobacco abuse counseling   • Current every day smoker   • Lumbar facet arthropathy   • Lumbar disc disease with radiculopathy   • Lumbar foraminal stenosis       Past Medical History:   Diagnosis Date   • Breast cancer (CMS/HCC)     2006 LEFT    • Drug therapy    • Radiation          Past Surgical History:   Procedure Laterality Date   • BREAST BIOPSY     •  SECTION     • HYSTERECTOMY     • MASTECTOMY      BILATERAL   • OTHER SURGICAL HISTORY  2018    stones removed from submandibular gland          Family History   Problem Relation Age of Onset   • COPD Mother    • No Known Problems Father    • No Known Problems Sister    • No Known Problems Brother    • No Known Problems Daughter    • No Known Problems Son    • Stroke Maternal Grandmother    • No Known Problems Paternal Grandmother    • No Known Problems Maternal Aunt    • No Known Problems Paternal Aunt          Social History     Socioeconomic History   • Marital status:      Spouse name: Not on file   • Number of children: Not on file   • Years of education: Not on file   • Highest education level: Not on file   Tobacco Use   • Smoking status: Current Every Day Smoker     Packs/day: 1.00     Years: 30.00     Pack years: 30.00     Types: Cigarettes   • Smokeless tobacco: Never Used   Substance and Sexual Activity   • Alcohol use: No   • Drug use: No   • Sexual activity: Defer           Current Outpatient Medications:   •  acyclovir (ZOVIRAX) 400 MG tablet, Take 1 tablet by mouth 2 (Two) Times a Day. Take no more than 5 doses a day., Disp: 180 tablet, Rfl: 1  •  ALPRAZolam (XANAX) 0.5 MG tablet, Take 0.5 mg by mouth 2 (Two) Times a Day As Needed  "for Anxiety., Disp: , Rfl:   •  Dietary Management Product (RHEUMATE) capsule, TAKE 1 CAPSULE BY MOUTH ONCE DAILY, Disp: 30 capsule, Rfl: 5  •  gabapentin (NEURONTIN) 100 MG capsule, Take 1 capsule by mouth 3 (Three) Times a Day., Disp: 90 capsule, Rfl: 5  •  Multiple Vitamins-Minerals (MULTIVITAMIN ADULT PO), Take  by mouth Daily., Disp: , Rfl:   •  naproxen sodium (ALEVE) 220 MG tablet, Take 220 mg by mouth 2 (Two) Times a Day As Needed., Disp: , Rfl:   •  pyridoxine (VITAMIN B-6) 25 MG tablet, Take 1 tablet by mouth 3 (Three) Times a Day., Disp: 90 tablet, Rfl: 5  •  raNITIdine (ZANTAC) 75 MG tablet, Take 150 mg by mouth Daily., Disp: , Rfl:   •  venlafaxine XR (EFFEXOR-XR) 150 MG 24 hr capsule, Take 1 capsule by mouth Daily., Disp: 90 capsule, Rfl: 1      Allergies   Allergen Reactions   • Codeine Nausea Only   • Metoclopramide Unknown (See Comments)   • Penicillins Unknown (See Comments)   • Talwin [Pentazocine] Other (See Comments)     Oral swelling.        /70   Pulse 91   Temp 97.8 °F (36.6 °C) (Temporal)   Resp 18   Ht 160 cm (63\")   Wt 53.8 kg (118 lb 9.6 oz)   SpO2 98%   BMI 21.01 kg/m²       Physical Exam:  Constitutional: Patient is oriented to person, place, and time. Patient appears well-developed and well-nourished.   HEENT: Head: Normocephalic and atraumatic. Eyes: Conjunctivae and lids are normal. Pupils: Equal, round, reactive to light.   Neck: Trachea normal. Neck supple. No JVD present.   Pulmonary: No increased work of breathing or signs of respiratory distress.  Clear to auscultation bilaterally.   Cardiovascular: Normal rate and rhythm, normal S1 and S2, no murmurs.    Musculoskeletal   Gait and station: Normal  Lumbar spine: Passive and active range of motion are full and without pain. Extension, flexion, lateral flexion, rotation of the lumbar spine did not increase or reproduce pain. Lumbar facet joint loading maneuvers are negative.   Mitchell and Gaenslen's tests are negative "   Hip joints: The range of motion of the hip joints is full and without pain   Neurological: Patient is alert and oriented to person, place, and time. Speech: speech is normal. Cortical function: Normal mental status.   Cranial nerves: Cranial nerves 2-12 intact.   Reflex Scores:  Patellar: 2+ bilaterally  Achilles: 2+ bilaterally  Motor strength: 5/5  Motor Tone: normal tone.   Involuntary movements: none.   Right ankle clonus: absent  Left ankle clonus: absent   Negative long tract signs. Straight leg raising test is negative.   Sensation: No sensory loss. Sensory exam: intact to light touch, intact pain and temperature sensation, intact vibration sensation and normal proprioception.   Coordination: Normal finger to nose and heel to shin. Normal balance and negative. Romberg's sign negative.   Skin and subcutaneous tissue: Skin is warm and intact. No rash noted. No cyanosis.   Psychiatric: Judgment and insight: Normal. Orientation to person, place and time: Normal. Recent and remote memory: Intact. Mood and affect: Normal.     ASSESSMENT:   1. Lumbar disc disease with radiculopathy    2. Lumbar foraminal stenosis    3. DDD (degenerative disc disease), lumbar    4. Lumbar facet arthropathy    5. Current every day smoker    6. Anxiety and depression        PLAN/MEDICAL DECISION MAKING:   I have reviewed all available medical records as well as previous therapies as referenced above, and discussed the patient with Dr. Dill.  1. Interventional pain management measures: None indicated at this time.  Patient could be scheduled for repeat right L3-L4 and right L4-L5 transforaminal epidural steroid injections if pain recurs.  Patient will follow-up with Tri Callejas PA-C thereafter if she continues to struggle with pain.  2. Pharmacological measures: Reviewed and discussed.    A. Continue gabapentin 100 mg three times per day   3. Long-term rehabilitation efforts:  A. Patient will start a comprehensive physical  therapy program for water therapy, therapeutic exercise, core strengthening, gait and balance training, neurodynamics, myofascial release, cupping and dry needling once pain is under control  B. Start an exercise program such as yoga and water therapy  C. Contrast therapy: Apply ice-packs for 15-20 minutes, followed by heating pads for 15-20 minutes to affected area.  4. The patient and her family have been instructed to contact my office with any questions or difficulties. The patient understands the plan and agrees to proceed accordingly.         Patient Care Team:  Agnes Armstrong MD as PCP - General  Agnes Armstrong MD as PCP - Family Medicine  Blu Peacock PA-C as Physician Assistant (Neurosurgery)  Tri Callejas PA-C as Physician Assistant (Neurosurgery)  Reginaldo Virk MD as Consulting Physician (Orthopedic Surgery)  Mat Wiggins MD as Consulting Physician (Gastroenterology)  Santiago Duncan MD as Consulting Physician (Otolaryngology)     No orders of the defined types were placed in this encounter.        Future Appointments   Date Time Provider Department Center   1/9/2019  3:50 PM Reginaldo Virk MD [facility] None         Nasim Schuster PA-C     EMR Dragon/Transcription disclaimer:  Much of this encounter note is an electronic transcription of spoken language to printed text. Electronic transcription of spoken language may permit erroneous, or at times, nonsensical words or phrases to be inadvertently transcribed. Although I have reviewed the note for such errors, some may still exist.

## 2018-11-28 ENCOUNTER — OFFICE VISIT (OUTPATIENT)
Dept: PAIN MEDICINE | Facility: CLINIC | Age: 58
End: 2018-11-28

## 2018-11-28 VITALS
DIASTOLIC BLOOD PRESSURE: 70 MMHG | SYSTOLIC BLOOD PRESSURE: 110 MMHG | HEART RATE: 91 BPM | RESPIRATION RATE: 18 BRPM | HEIGHT: 63 IN | TEMPERATURE: 97.8 F | OXYGEN SATURATION: 98 % | BODY MASS INDEX: 21.02 KG/M2 | WEIGHT: 118.6 LBS

## 2018-11-28 DIAGNOSIS — F32.A ANXIETY AND DEPRESSION: ICD-10-CM

## 2018-11-28 DIAGNOSIS — M48.061 LUMBAR FORAMINAL STENOSIS: ICD-10-CM

## 2018-11-28 DIAGNOSIS — M51.36 DDD (DEGENERATIVE DISC DISEASE), LUMBAR: ICD-10-CM

## 2018-11-28 DIAGNOSIS — M51.16 LUMBAR DISC DISEASE WITH RADICULOPATHY: Primary | ICD-10-CM

## 2018-11-28 DIAGNOSIS — F41.9 ANXIETY AND DEPRESSION: ICD-10-CM

## 2018-11-28 DIAGNOSIS — M47.816 LUMBAR FACET ARTHROPATHY: ICD-10-CM

## 2018-11-28 DIAGNOSIS — F17.200 CURRENT EVERY DAY SMOKER: ICD-10-CM

## 2018-11-28 PROCEDURE — 99213 OFFICE O/P EST LOW 20 MIN: CPT | Performed by: PHYSICIAN ASSISTANT

## 2018-11-28 RX ORDER — NAPROXEN SODIUM 220 MG
220 TABLET ORAL 2 TIMES DAILY PRN
COMMUNITY
End: 2021-02-18

## 2018-12-04 NOTE — ADDENDUM NOTE
Addended by: MAXWELL ABERNATHY on: 12/4/2018 08:00 AM     Modules accepted: Level of Service, SmartSet

## 2019-01-09 ENCOUNTER — OFFICE VISIT (OUTPATIENT)
Dept: ORTHOPEDIC SURGERY | Facility: CLINIC | Age: 59
End: 2019-01-09

## 2019-01-09 VITALS — BODY MASS INDEX: 21.41 KG/M2 | OXYGEN SATURATION: 84 % | HEART RATE: 67 BPM | HEIGHT: 63 IN | WEIGHT: 120.81 LBS

## 2019-01-09 DIAGNOSIS — M75.41 IMPINGEMENT SYNDROME OF BOTH SHOULDERS: Primary | ICD-10-CM

## 2019-01-09 DIAGNOSIS — M75.42 IMPINGEMENT SYNDROME OF BOTH SHOULDERS: Primary | ICD-10-CM

## 2019-01-09 PROCEDURE — 20610 DRAIN/INJ JOINT/BURSA W/O US: CPT | Performed by: ORTHOPAEDIC SURGERY

## 2019-01-09 RX ADMIN — LIDOCAINE HYDROCHLORIDE 4 ML: 10 INJECTION, SOLUTION INFILTRATION; PERINEURAL at 17:03

## 2019-01-09 RX ADMIN — TRIAMCINOLONE ACETONIDE 40 MG: 40 INJECTION, SUSPENSION INTRA-ARTICULAR; INTRAMUSCULAR at 17:07

## 2019-01-09 RX ADMIN — TRIAMCINOLONE ACETONIDE 40 MG: 40 INJECTION, SUSPENSION INTRA-ARTICULAR; INTRAMUSCULAR at 17:03

## 2019-01-09 RX ADMIN — LIDOCAINE HYDROCHLORIDE 4 ML: 10 INJECTION, SOLUTION INFILTRATION; PERINEURAL at 17:07

## 2019-01-09 NOTE — PROGRESS NOTES
Procedure   Large Joint Arthrocentesis: R subacromial bursa  Date/Time: 1/9/2019 5:03 PM  Consent given by: patient  Site marked: site marked  Timeout: Immediately prior to procedure a time out was called to verify the correct patient, procedure, equipment, support staff and site/side marked as required   Supporting Documentation  Indications: pain   Procedure Details  Location: shoulder - R subacromial bursa  Needle size: 22 G  Medications administered: 4 mL lidocaine 1 %; 40 mg triamcinolone acetonide 40 MG/ML  Patient tolerance: patient tolerated the procedure well with no immediate complications

## 2019-01-09 NOTE — PROGRESS NOTES
Procedure   Large Joint Arthrocentesis: L subacromial bursa  Date/Time: 1/9/2019 5:07 PM  Consent given by: patient  Site marked: site marked  Timeout: Immediately prior to procedure a time out was called to verify the correct patient, procedure, equipment, support staff and site/side marked as required   Supporting Documentation  Indications: pain   Procedure Details  Location: shoulder - L subacromial bursa  Preparation: Patient was prepped and draped in the usual sterile fashion  Needle size: 22 G  Medications administered: 4 mL lidocaine 1 %; 40 mg triamcinolone acetonide 40 MG/ML  Patient tolerance: patient tolerated the procedure well with no immediate complications

## 2019-01-09 NOTE — PROGRESS NOTES
[facility] Note    Subjective     Chief Complaint   Patient presents with   • Right Shoulder - Follow-up     Impingement Syndrome of Both Shoulders; Bilateral Shoulder Cortisone Injection given 2018   • Left Shoulder - Follow-up     Impingement Syndrome of Both Shoulders; Bilateral Shoulder Cortisone Injection given 2018        HPI    Yeni Olivo is a 58 y.o. female.  She follows up for bilateral shoulder pain.  She has a known history of impingement, and has responded to injections well and the past.  She would like repeat injections today.  Pain is currently 4 out of 10, aching, stabbing and shooting.  She has tried anti-inflammatories and physical therapy in the past.      Patient Active Problem List   Diagnosis   • CTS (carpal tunnel syndrome)   • DDD (degenerative disc disease), lumbar   • HNP (herniated nucleus pulposus), lumbar   • Anxiety and depression   • Tobacco abuse counseling   • Current every day smoker   • Lumbar facet arthropathy   • Lumbar disc disease with radiculopathy   • Lumbar foraminal stenosis     Past Medical History:   Diagnosis Date   • Breast cancer (CMS/[facility])      LEFT    • Drug therapy    • Radiation       Past Surgical History:   Procedure Laterality Date   • BREAST BIOPSY     •  SECTION     • HYSTERECTOMY     • MASTECTOMY      BILATERAL   • OTHER SURGICAL HISTORY  2018    stones removed from submandibular gland       Family History   Problem Relation Age of Onset   • COPD Mother    • No Known Problems Father    • No Known Problems Sister    • No Known Problems Brother    • No Known Problems Daughter    • No Known Problems Son    • Stroke Maternal Grandmother    • No Known Problems Paternal Grandmother    • No Known Problems Maternal Aunt    • No Known Problems Paternal Aunt      Social History     Socioeconomic History   • Marital status:      Spouse name: Not on file   • Number of children: Not on file   • Years of  education: Not on file   • Highest education level: Not on file   Social Needs   • Financial resource strain: Not on file   • Food insecurity - worry: Not on file   • Food insecurity - inability: Not on file   • Transportation needs - medical: Not on file   • Transportation needs - non-medical: Not on file   Occupational History   • Not on file   Tobacco Use   • Smoking status: Current Every Day Smoker     Packs/day: 1.00     Years: 30.00     Pack years: 30.00     Types: Cigarettes   • Smokeless tobacco: Never Used   Substance and Sexual Activity   • Alcohol use: No   • Drug use: No   • Sexual activity: Defer   Other Topics Concern   • Not on file   Social History Narrative   • Not on file      Current Outpatient Medications on File Prior to Visit   Medication Sig Dispense Refill   • acyclovir (ZOVIRAX) 400 MG tablet Take 1 tablet by mouth 2 (Two) Times a Day. Take no more than 5 doses a day. 180 tablet 1   • ALPRAZolam (XANAX) 0.5 MG tablet Take 0.5 mg by mouth 2 (Two) Times a Day As Needed for Anxiety.     • gabapentin (NEURONTIN) 100 MG capsule Take 1 capsule by mouth 3 (Three) Times a Day. 90 capsule 5   • Multiple Vitamins-Minerals (MULTIVITAMIN ADULT PO) Take  by mouth Daily.     • naproxen sodium (ALEVE) 220 MG tablet Take 220 mg by mouth 2 (Two) Times a Day As Needed.     • raNITIdine (ZANTAC) 75 MG tablet Take 150 mg by mouth Daily.     • venlafaxine XR (EFFEXOR-XR) 150 MG 24 hr capsule Take 1 capsule by mouth Daily. 90 capsule 1     No current facility-administered medications on file prior to visit.       Allergies   Allergen Reactions   • Codeine Nausea Only   • Metoclopramide Unknown (See Comments)   • Penicillins Unknown (See Comments)   • Talwin [Pentazocine] Other (See Comments)     Oral swelling.         Review of Systems   Constitutional: Negative for activity change, appetite change, chills, diaphoresis, fatigue, fever and unexpected weight change.   HENT: Negative for congestion, dental problem,  "drooling, ear discharge, ear pain, facial swelling, hearing loss, mouth sores, nosebleeds, postnasal drip, rhinorrhea, sinus pressure, sneezing, sore throat, tinnitus, trouble swallowing and voice change.    Eyes: Negative for photophobia, pain, discharge, redness, itching and visual disturbance.   Respiratory: Negative for apnea, cough, choking, chest tightness, shortness of breath, wheezing and stridor.    Cardiovascular: Negative for chest pain, palpitations and leg swelling.   Gastrointestinal: Negative for abdominal distention, abdominal pain, anal bleeding, blood in stool, constipation, diarrhea, nausea, rectal pain and vomiting.   Endocrine: Negative for cold intolerance, heat intolerance, polydipsia, polyphagia and polyuria.   Genitourinary: Negative for decreased urine volume, difficulty urinating, dysuria, enuresis, flank pain, frequency, genital sores, hematuria and urgency.   Musculoskeletal: Positive for joint swelling. Negative for arthralgias, back pain, gait problem, myalgias, neck pain and neck stiffness.   Skin: Negative for color change, pallor, rash and wound.   Allergic/Immunologic: Negative for environmental allergies, food allergies and immunocompromised state.   Neurological: Negative for dizziness, tremors, seizures, syncope, facial asymmetry, speech difficulty, weakness, light-headedness, numbness and headaches.   Hematological: Negative for adenopathy. Bruises/bleeds easily.   Psychiatric/Behavioral: Negative for agitation, behavioral problems, confusion, decreased concentration, dysphoric mood, hallucinations, self-injury, sleep disturbance and suicidal ideas. The patient is nervous/anxious. The patient is not hyperactive.         Objective      Physical Exam  Pulse 67   Ht 160 cm (62.99\")   Wt 54.8 kg (120 lb 13 oz)   SpO2 (!) 84%   BMI 21.41 kg/m²     Body mass index is 21.41 kg/m².    General:   Mental Status:  Alert   Appearance: Cooperative, in no acute distress   Build and " Nutrition: Well-nourished and well developed female   Orientation: Alert and oriented to person, place and time   Posture: Normal   Gait: Normal    Integument:   Right shoulder: No skin lesions, no rash, no ecchymosis   Left shoulder: No skin lesions, no rash, no ecchymosis    Upper Extremities:   Right Shoulder:    Tenderness: None    Swelling:  None    Crepitus:  None    Atrophy:  None    Range of motion:  External rotation:  70°       Forward flexion:  170°       Abduction:   160°  Instability:  None  Deformities:  None  Functional testing: Negative drop arm, negative lift-off, positive impingement   Left Shoulder:    Tenderness:  None    Swelling:  None    Crepitus: None    Atrophy:  None    Range of motion:  External rotation:  70°       Forward flexion:  160°       Abduction:   150°  Instability:  None  Deformities:  None  Functional testing: Negative drop arm, negative lift-off, positive impingement          Assessment and Plan     Yeni was seen today for follow-up and follow-up.    Diagnoses and all orders for this visit:    Impingement syndrome of both shoulders  -     Large Joint Arthrocentesis: R subacromial bursa  -     Large Joint Arthrocentesis: L subacromial bursa        I reviewed my findings with patient.  She would like repeat subacromial injections in both shoulders, and these were performed.  Of note, she had 85% relief just a few minutes following the injections.  MRIs have been obtained a few years ago, and it may be worth repeating if she has continued pain.    Return in about 4 months (around 5/9/2019).      Medical Decision Making  Management Options : prescription/IM medicine      Reginaldo Virk MD  01/10/19  1:32 PM

## 2019-01-10 RX ORDER — TRIAMCINOLONE ACETONIDE 40 MG/ML
40 INJECTION, SUSPENSION INTRA-ARTICULAR; INTRAMUSCULAR
Status: COMPLETED | OUTPATIENT
Start: 2019-01-09 | End: 2019-01-09

## 2019-01-10 RX ORDER — LIDOCAINE HYDROCHLORIDE 10 MG/ML
4 INJECTION, SOLUTION INFILTRATION; PERINEURAL
Status: COMPLETED | OUTPATIENT
Start: 2019-01-09 | End: 2019-01-09

## 2019-05-30 RX ORDER — GABAPENTIN 100 MG/1
100 CAPSULE ORAL 3 TIMES DAILY
Qty: 90 CAPSULE | Refills: 5 | OUTPATIENT
Start: 2019-05-30

## 2019-05-30 RX ORDER — GABAPENTIN 100 MG/1
100 CAPSULE ORAL 3 TIMES DAILY
Qty: 90 CAPSULE | Refills: 5 | Status: CANCELLED | OUTPATIENT
Start: 2019-05-30

## 2019-10-02 ENCOUNTER — OFFICE VISIT (OUTPATIENT)
Dept: ORTHOPEDIC SURGERY | Facility: CLINIC | Age: 59
End: 2019-10-02

## 2019-10-02 VITALS — HEART RATE: 59 BPM | HEIGHT: 63 IN | OXYGEN SATURATION: 98 % | WEIGHT: 120.15 LBS | BODY MASS INDEX: 21.29 KG/M2

## 2019-10-02 DIAGNOSIS — M75.42 IMPINGEMENT SYNDROME OF BOTH SHOULDERS: Primary | ICD-10-CM

## 2019-10-02 DIAGNOSIS — M75.41 IMPINGEMENT SYNDROME OF BOTH SHOULDERS: Primary | ICD-10-CM

## 2019-10-02 PROCEDURE — 20610 DRAIN/INJ JOINT/BURSA W/O US: CPT | Performed by: ORTHOPAEDIC SURGERY

## 2019-10-02 RX ORDER — TRIAMCINOLONE ACETONIDE 40 MG/ML
40 INJECTION, SUSPENSION INTRA-ARTICULAR; INTRAMUSCULAR
Status: COMPLETED | OUTPATIENT
Start: 2019-10-02 | End: 2019-10-02

## 2019-10-02 RX ORDER — ROPIVACAINE HYDROCHLORIDE 5 MG/ML
4 INJECTION, SOLUTION EPIDURAL; INFILTRATION; PERINEURAL
Status: COMPLETED | OUTPATIENT
Start: 2019-10-02 | End: 2019-10-02

## 2019-10-02 RX ADMIN — TRIAMCINOLONE ACETONIDE 40 MG: 40 INJECTION, SUSPENSION INTRA-ARTICULAR; INTRAMUSCULAR at 09:41

## 2019-10-02 RX ADMIN — ROPIVACAINE HYDROCHLORIDE 4 ML: 5 INJECTION, SOLUTION EPIDURAL; INFILTRATION; PERINEURAL at 09:41

## 2019-10-02 NOTE — PROGRESS NOTES
Procedure   Large Joint Arthrocentesis: R subacromial bursa  Date/Time: 10/2/2019 9:41 AM  Consent given by: patient  Site marked: site marked  Timeout: Immediately prior to procedure a time out was called to verify the correct patient, procedure, equipment, support staff and site/side marked as required   Supporting Documentation  Indications: pain   Procedure Details  Location: shoulder - R subacromial bursa  Preparation: Patient was prepped and draped in the usual sterile fashion  Needle size: 22 G  Approach: anterolateral  Medications administered: 40 mg triamcinolone acetonide 40 MG/ML; 4 mL ropivacaine 0.5 %  Patient tolerance: patient tolerated the procedure well with no immediate complications    Large Joint Arthrocentesis: L subacromial bursa  Date/Time: 10/2/2019 9:41 AM  Consent given by: patient  Site marked: site marked  Timeout: Immediately prior to procedure a time out was called to verify the correct patient, procedure, equipment, support staff and site/side marked as required   Supporting Documentation  Indications: pain   Procedure Details  Location: shoulder - L subacromial bursa  Preparation: Patient was prepped and draped in the usual sterile fashion  Needle size: 22 G  Approach: anterolateral  Medications administered: 40 mg triamcinolone acetonide 40 MG/ML; 4 mL ropivacaine 0.5 %

## 2019-10-02 NOTE — PROGRESS NOTES
[facility] [place] [facility] Note    Subjective     Chief Complaint   Patient presents with   • Left Shoulder - Follow-up, Pain     Impingement Syndrome of Both Shoulders; Bilateral Shoulder Cortisone Injection given 2019   • Right Shoulder - Follow-up, Pain     Impingement Syndrome of Both Shoulders; Bilateral Shoulder Cortisone Injection given 2019        HPI    Yeni Olivo is a 59 y.o. female.  She presents today for evaluation of bilateral shoulder pain.  She has been seen for her shoulders before, and has responded to subacromial injections.  Last injections were 2019.  She would like repeat injections today.      Patient Active Problem List   Diagnosis   • CTS (carpal tunnel syndrome)   • DDD (degenerative disc disease), lumbar   • HNP (herniated nucleus pulposus), lumbar   • Anxiety and depression   • Tobacco abuse counseling   • Current every day smoker   • Lumbar facet arthropathy   • Lumbar disc disease with radiculopathy   • Lumbar foraminal stenosis     Past Medical History:   Diagnosis Date   • Breast cancer (CMS/HCC)     2006 LEFT    • Drug therapy    • Radiation       Past Surgical History:   Procedure Laterality Date   • BREAST BIOPSY     •  SECTION     • HYSTERECTOMY     • MASTECTOMY      BILATERAL   • OTHER SURGICAL HISTORY  2018    stones removed from submandibular gland       Family History   Problem Relation Age of Onset   • COPD Mother    • No Known Problems Father    • No Known Problems Sister    • No Known Problems Brother    • No Known Problems Daughter    • No Known Problems Son    • Stroke Maternal Grandmother    • No Known Problems Paternal Grandmother    • No Known Problems Maternal Aunt    • No Known Problems Paternal Aunt      Social History     Socioeconomic History   • Marital status:      Spouse name: Not on file   • Number of children: Not on file   • Years of education: Not on file   • Highest education level: Not on file    Tobacco Use   • Smoking status: Current Every Day Smoker     Packs/day: 1.00     Years: 30.00     Pack years: 30.00     Types: Cigarettes   • Smokeless tobacco: Never Used   Substance and Sexual Activity   • Alcohol use: No   • Drug use: No   • Sexual activity: Defer      Current Outpatient Medications on File Prior to Visit   Medication Sig Dispense Refill   • acyclovir (ZOVIRAX) 400 MG tablet Take 1 tablet by mouth 2 (Two) Times a Day. 180 tablet 1   • ALPRAZolam (XANAX) 0.5 MG tablet Take 0.5 mg by mouth 2 (Two) Times a Day As Needed for Anxiety.     • ALPRAZolam (XANAX) 0.5 MG tablet Take 2 tablets by mouth 2 (Two) Times a Day. 360 tablet 0   • ALPRAZolam (XANAX) 0.5 MG tablet Take 2 tablets by mouth 2 (Two) Times a Day. 360 tablet 0   • gabapentin (NEURONTIN) 100 MG capsule Take 1 capsule by mouth 3 (Three) Times a Day. 270 capsule 0   • Multiple Vitamins-Minerals (MULTIVITAMIN ADULT PO) Take  by mouth Daily.     • mupirocin (BACTROBAN) 2 % ointment Apply to surgery site daily with bandage change 22 g 1   • naproxen sodium (ALEVE) 220 MG tablet Take 220 mg by mouth 2 (Two) Times a Day As Needed.     • raNITIdine (ZANTAC) 75 MG tablet Take 150 mg by mouth Daily.     • venlafaxine XR (EFFEXOR-XR) 150 MG 24 hr capsule Take 1 capsule by mouth Daily. 90 capsule 1   • [DISCONTINUED] venlafaxine XR (EFFEXOR-XR) 150 MG 24 hr capsule Take 1 capsule by mouth Daily. 90 capsule 1     No current facility-administered medications on file prior to visit.       Allergies   Allergen Reactions   • Codeine Nausea Only   • Metoclopramide Unknown (See Comments)   • Penicillins Unknown (See Comments)   • Talwin [Pentazocine] Other (See Comments)     Oral swelling.         Review of Systems   Constitutional: Positive for fatigue. Negative for activity change, appetite change, chills, diaphoresis, fever and unexpected weight change.   HENT: Positive for ear pain and hearing loss. Negative for congestion, dental problem, drooling,  "ear discharge, facial swelling, mouth sores, nosebleeds, postnasal drip, rhinorrhea, sinus pressure, sneezing, sore throat, tinnitus, trouble swallowing and voice change.    Eyes: Negative for photophobia, pain, discharge, redness, itching and visual disturbance.   Respiratory: Negative for apnea, cough, choking, chest tightness, shortness of breath, wheezing and stridor.    Cardiovascular: Negative for chest pain, palpitations and leg swelling.   Gastrointestinal: Negative for abdominal distention, abdominal pain, anal bleeding, blood in stool, constipation, diarrhea, nausea, rectal pain and vomiting.   Endocrine: Negative for cold intolerance, heat intolerance, polydipsia, polyphagia and polyuria.   Genitourinary: Negative for decreased urine volume, difficulty urinating, dysuria, enuresis, flank pain, frequency, genital sores, hematuria and urgency.   Musculoskeletal: Positive for arthralgias and back pain. Negative for gait problem, joint swelling, myalgias, neck pain and neck stiffness.   Skin: Negative for color change, pallor, rash and wound.   Allergic/Immunologic: Negative for environmental allergies, food allergies and immunocompromised state.   Neurological: Negative for dizziness, tremors, seizures, syncope, facial asymmetry, speech difficulty, weakness, light-headedness, numbness and headaches.   Hematological: Negative for adenopathy. Does not bruise/bleed easily.   Psychiatric/Behavioral: Positive for sleep disturbance. Negative for agitation, behavioral problems, confusion, decreased concentration, dysphoric mood, hallucinations, self-injury and suicidal ideas. The patient is nervous/anxious. The patient is not hyperactive.         Objective      Physical Exam  Pulse 59   Ht 160 cm (62.99\")   Wt 54.5 kg (120 lb 2.4 oz)   SpO2 98%   Breastfeeding? No   BMI 21.29 kg/m²     Body mass index is 21.29 kg/m².    General:   Mental Status:  Alert   Appearance: Cooperative, in no acute distress   Build " and Nutrition: Well-nourished well-developed female   Orientation: Alert and oriented to person, place and time   Posture: Normal   Gait: Normal    Integument:   Right shoulder: No skin lesions, no rash, no ecchymosis   Left shoulder: No skin lesions, no rash, no ecchymosis    Upper Extremities:   Right Shoulder:    Tenderness: None    Swelling:  None    Crepitus:  None    Atrophy:  None    Range of motion:  External rotation:  70°       Forward flexion:  170°       Abduction:   170°  Instability:  None  Deformities:  None  Functional testing: Negative drop arm, negative lift-off, positive impingement   Left Shoulder:    Tenderness:  None    Swelling:  None    Crepitus: None    Atrophy:  None    Range of motion:  External rotation:  70°       Forward flexion:  170°       Abduction:   170°  Instability:  None  Deformities:  None  Functional testing: Negative drop arm, negative lift-off, positive impingement      Assessment and Plan     Yeni was seen today for follow-up, pain, follow-up and pain.    Diagnoses and all orders for this visit:    Impingement syndrome of both shoulders  -     Cancel: XR Shoulder 2+ View Bilateral  -     Large Joint Arthrocentesis: R subacromial bursa  -     Large Joint Arthrocentesis: L subacromial bursa        1. Impingement syndrome of both shoulders        I reviewed my findings with the patient today.  She does have bilateral shoulder impingement, and would like repeat injections today.  Last injections were January 2019.  We may consider further imaging with MRI.  I will see her back in 3 months, but sooner for any problems.  Subacromial injections were administered today.    Of note, she had 93% relief just a few minutes following the injection in both shoulders.    Return in about 3 months (around 1/2/2020).      Medical Decision Making  Management Options : prescription/IM medicine      Reginaldo Virk MD  10/02/19  10:00 AM

## 2020-06-08 ENCOUNTER — OFFICE VISIT (OUTPATIENT)
Dept: ORTHOPEDIC SURGERY | Facility: CLINIC | Age: 60
End: 2020-06-08

## 2020-06-08 VITALS — WEIGHT: 123.6 LBS | HEIGHT: 63 IN | HEART RATE: 91 BPM | OXYGEN SATURATION: 98 % | BODY MASS INDEX: 21.9 KG/M2

## 2020-06-08 DIAGNOSIS — M75.42 IMPINGEMENT SYNDROME OF BOTH SHOULDERS: Primary | ICD-10-CM

## 2020-06-08 DIAGNOSIS — M75.41 IMPINGEMENT SYNDROME OF BOTH SHOULDERS: Primary | ICD-10-CM

## 2020-06-08 PROCEDURE — 20610 DRAIN/INJ JOINT/BURSA W/O US: CPT | Performed by: ORTHOPAEDIC SURGERY

## 2020-06-08 RX ORDER — ROPIVACAINE HYDROCHLORIDE 5 MG/ML
4 INJECTION, SOLUTION EPIDURAL; INFILTRATION; PERINEURAL
Status: COMPLETED | OUTPATIENT
Start: 2020-06-08 | End: 2020-06-08

## 2020-06-08 RX ORDER — TRIAMCINOLONE ACETONIDE 40 MG/ML
40 INJECTION, SUSPENSION INTRA-ARTICULAR; INTRAMUSCULAR
Status: COMPLETED | OUTPATIENT
Start: 2020-06-08 | End: 2020-06-08

## 2020-06-08 RX ADMIN — ROPIVACAINE HYDROCHLORIDE 4 ML: 5 INJECTION, SOLUTION EPIDURAL; INFILTRATION; PERINEURAL at 15:22

## 2020-06-08 RX ADMIN — ROPIVACAINE HYDROCHLORIDE 4 ML: 5 INJECTION, SOLUTION EPIDURAL; INFILTRATION; PERINEURAL at 15:24

## 2020-06-08 RX ADMIN — TRIAMCINOLONE ACETONIDE 40 MG: 40 INJECTION, SUSPENSION INTRA-ARTICULAR; INTRAMUSCULAR at 15:22

## 2020-06-08 RX ADMIN — TRIAMCINOLONE ACETONIDE 40 MG: 40 INJECTION, SUSPENSION INTRA-ARTICULAR; INTRAMUSCULAR at 15:24

## 2020-06-08 NOTE — PROGRESS NOTES
[facility] [place] [facility] Note    Subjective     Chief Complaint   Patient presents with   • Follow-up     8 month follow up; Impingement syndrome of both shoulders-cortisone injections given at last visit 10/2/19        HPI    It has been 8  month(s) since Ms. Olivo's last visit. She returns to clinic today for follow-up of bilateral shoulder pain. She rates her pain a 7/10 on the pain scale. Previous/current treatments: NSAIDS, physical therapy and steroid injection (last injection 10/2/19). Current symptoms: pain. The pain is worse with sleeping and working; pain medication and/or NSAID improve the pain. Overall, she is doing the same.  She would like injections today.  Previous injections have provided relief.  Left shoulder bothers her more than the right.    I have reviewed the following portions of the patient's history:History of Present Illness     Patient Active Problem List   Diagnosis   • CTS (carpal tunnel syndrome)   • DDD (degenerative disc disease), lumbar   • HNP (herniated nucleus pulposus), lumbar   • Anxiety and depression   • Tobacco abuse counseling   • Current every day smoker   • Lumbar facet arthropathy   • Lumbar disc disease with radiculopathy   • Lumbar foraminal stenosis     Past Medical History:   Diagnosis Date   • Breast cancer (CMS/[facility])     2006 LEFT    • Drug therapy    • Radiation       Past Surgical History:   Procedure Laterality Date   • BREAST BIOPSY     •  SECTION     • HYSTERECTOMY     • MASTECTOMY      BILATERAL   • OTHER SURGICAL HISTORY  2018    stones removed from submandibular gland       Family History   Problem Relation Age of Onset   • COPD Mother    • No Known Problems Father    • No Known Problems Sister    • No Known Problems Brother    • No Known Problems Daughter    • No Known Problems Son    • Stroke Maternal Grandmother    • No Known Problems Paternal Grandmother    • No Known Problems Maternal Aunt    • No Known Problems Paternal Aunt          Social History     Socioeconomic History   • Marital status:      Spouse name: Not on file   • Number of children: Not on file   • Years of education: Not on file   • Highest education level: Not on file   Tobacco Use   • Smoking status: Current Every Day Smoker     Packs/day: 1.00     Years: 30.00     Pack years: 30.00     Types: Cigarettes   • Smokeless tobacco: Never Used   Substance and Sexual Activity   • Alcohol use: No   • Drug use: No   • Sexual activity: Defer      Current Outpatient Medications on File Prior to Visit   Medication Sig Dispense Refill   • acyclovir (ZOVIRAX) 400 MG tablet Take 1 tablet by mouth 2 (Two) Times a Day. Take no more than 5 doses a day. 60 tablet 5   • ALPRAZolam (XANAX) 0.5 MG tablet Take 0.5 mg by mouth 2 (Two) Times a Day As Needed for Anxiety.     • ALPRAZolam (XANAX) 0.5 MG tablet Take 2 tablets by mouth 2 (Two) Times a Day. 360 tablet 0   • ALPRAZolam (XANAX) 0.5 MG tablet Take 2 tablets by mouth 2 (Two) Times a Day. 360 tablet 0   • ALPRAZolam (XANAX) 0.5 MG tablet Take 2 tablets by mouth 2 (Two) Times a Day. 60 tablet 0   • gabapentin (NEURONTIN) 100 MG capsule Take 1 capsule by mouth 3 (Three) Times a Day. 270 capsule 0   • Multiple Vitamins-Minerals (MULTIVITAMIN ADULT PO) Take  by mouth Daily.     • mupirocin (BACTROBAN) 2 % ointment Apply to surgery site daily with bandage change 22 g 1   • naproxen sodium (ALEVE) 220 MG tablet Take 220 mg by mouth 2 (Two) Times a Day As Needed.     • ofloxacin (FLOXIN) 0.3 % otic solution Administer 10 drops into ear(s) as directed by provider Daily. 5 mL 5   • raNITIdine (ZANTAC) 75 MG tablet Take 150 mg by mouth Daily.     • venlafaxine XR (EFFEXOR-XR) 150 MG 24 hr capsule Take 1 capsule by mouth Daily. 30 capsule 5     No current facility-administered medications on file prior to visit.       Allergies   Allergen Reactions   • Codeine Nausea Only   • Metoclopramide Unknown (See Comments)   • Penicillins Unknown (See  "Comments)   • Talwin [Pentazocine] Other (See Comments)     Oral swelling.         Review of Systems   Constitutional: Negative.    HENT: Negative.    Eyes: Negative.    Respiratory: Negative.    Cardiovascular: Negative.    Gastrointestinal: Negative.    Endocrine: Negative.    Genitourinary: Negative.    Musculoskeletal: Positive for arthralgias.   Skin: Negative.    Allergic/Immunologic: Negative.    Neurological: Negative.    Hematological: Negative.    Psychiatric/Behavioral: Negative.         Objective      Physical Exam  Pulse 91   Ht 160 cm (62.99\")   Wt 56.1 kg (123 lb 9.6 oz)   SpO2 98%   BMI 21.90 kg/m²     Body mass index is 21.9 kg/m².    General:   Mental Status:  Alert   Appearance: Cooperative, in no acute distress   Build and Nutrition: Thin female   Orientation: Alert and oriented to person, place and time   Posture: Normal   Gait: Normal    Integument:              Right shoulder: No skin lesions, no rash, no ecchymosis              Left shoulder: No skin lesions, no rash, no ecchymosis     Upper Extremities:              Right Shoulder:                          Tenderness:    None                          Swelling:          None                          Crepitus:          None                          Atrophy:           None                          Range of motion:        External rotation:         70°                                                              Forward flexion:          170°                                                              Abduction:                   170°  Instability:        None  Deformities:     None  Functional testing: Negative drop arm, negative lift-off,  negative impingement              Left Shoulder:                          Tenderness:    None                          Swelling:          None                          Crepitus:          None                          Atrophy:           None                          Range of motion:        External " rotation:         50°                                                              Forward flexion:          170°                                                              Abduction:                   140°  Instability:        None  Deformities:     None  Functional testing: Negative drop arm, negative lift-off, positive impingement      Assessment and Plan     Yeni was seen today for follow-up.    Diagnoses and all orders for this visit:    Impingement syndrome of both shoulders  -     MRI Shoulder Right Without Contrast; Future  -     MRI Shoulder Left Without Contrast; Future    Other orders  -     Large Joint Arthrocentesis  -     Large Joint Arthrocentesis        1. Impingement syndrome of both shoulders        I reviewed my findings with the patient today.  She would like to have injections today.  I would like to get MRIs on her shoulders, we will wait a month after the injections.  I will see her back after the MRIs have been completed, but sooner for any problems.    Of note, she had 90% improvement just a few minutes following the injections today.    Return in about 6 months (around 12/8/2020).    Medical Decision Making  Management Options : prescription/IM medicine  Data/Risk: radiology tests      Reginaldo Virk MD  06/08/20  15:24    Dragon disclaimer:  Much of this encounter note is an electronic transcription/translation of spoken language to printed text. The electronic translation of spoken language may permit erroneous, or at times, nonsensical words or phrases to be inadvertently transcribed; Although I have reviewed the note for such errors, some may still exist.

## 2020-06-08 NOTE — PROGRESS NOTES
Procedure   Large Joint Arthrocentesis: R subacromial bursa  Date/Time: 6/8/2020 3:22 PM  Consent given by: patient  Site marked: site marked  Timeout: Immediately prior to procedure a time out was called to verify the correct patient, procedure, equipment, support staff and site/side marked as required   Supporting Documentation  Indications: pain   Procedure Details  Location: shoulder - R subacromial bursa  Preparation: Patient was prepped and draped in the usual sterile fashion  Needle size: 22 G  Approach: posterior  Medications administered: 4 mL ropivacaine 0.5 %; 40 mg triamcinolone acetonide 40 MG/ML  Patient tolerance: patient tolerated the procedure well with no immediate complications    Large Joint Arthrocentesis: L subacromial bursa  Date/Time: 6/8/2020 3:24 PM  Consent given by: patient  Site marked: site marked  Timeout: Immediately prior to procedure a time out was called to verify the correct patient, procedure, equipment, support staff and site/side marked as required   Supporting Documentation  Indications: pain   Procedure Details  Location: shoulder - L subacromial bursa  Preparation: Patient was prepped and draped in the usual sterile fashion  Needle size: 22 G  Approach: posterior  Medications administered: 4 mL ropivacaine 0.5 %; 40 mg triamcinolone acetonide 40 MG/ML  Patient tolerance: patient tolerated the procedure well with no immediate complications

## 2020-07-13 ENCOUNTER — HOSPITAL ENCOUNTER (OUTPATIENT)
Dept: MRI IMAGING | Facility: HOSPITAL | Age: 60
Discharge: HOME | End: 2020-07-13
Admitting: ORTHOPAEDIC SURGERY

## 2020-07-13 ENCOUNTER — HOSPITAL ENCOUNTER (OUTPATIENT)
Dept: MRI IMAGING | Facility: HOSPITAL | Age: 60
Discharge: HOME | End: 2020-07-13

## 2020-07-13 DIAGNOSIS — M75.41 IMPINGEMENT SYNDROME OF BOTH SHOULDERS: ICD-10-CM

## 2020-07-13 DIAGNOSIS — M75.42 IMPINGEMENT SYNDROME OF BOTH SHOULDERS: ICD-10-CM

## 2020-07-13 PROCEDURE — 73221 MRI JOINT UPR EXTREM W/O DYE: CPT

## 2020-07-15 ENCOUNTER — OFFICE VISIT (OUTPATIENT)
Dept: ORTHOPEDIC SURGERY | Facility: CLINIC | Age: 60
End: 2020-07-15

## 2020-07-15 VITALS — OXYGEN SATURATION: 98 % | HEIGHT: 63 IN | HEART RATE: 98 BPM | WEIGHT: 126.1 LBS | BODY MASS INDEX: 22.34 KG/M2

## 2020-07-15 DIAGNOSIS — M75.101 ROTATOR CUFF SYNDROME OF BOTH SHOULDERS: Primary | ICD-10-CM

## 2020-07-15 DIAGNOSIS — M75.102 ROTATOR CUFF SYNDROME OF BOTH SHOULDERS: Primary | ICD-10-CM

## 2020-07-15 PROCEDURE — 99213 OFFICE O/P EST LOW 20 MIN: CPT | Performed by: ORTHOPAEDIC SURGERY

## 2020-07-15 NOTE — PROGRESS NOTES
[facility]    Subjective     Chief Complaint   Patient presents with   • Follow-up     Bilateral shoulder MRI 20        HPI    It has been 5  week(s) since Ms. Olivo's last visit. She returns to clinic today for follow-up of bilateral knee MRI. She rates her pain a 0/10 on the pain scale. Previous/current treatments: NSAIDS and physical therapy. Current symptoms: none. The pain is worse with none; cortisone injections improve the pain. Overall, she is doing better.  Today to review the MRI results.  No new complaints.  Improved from her last visit.    I have reviewed the following portions of the patient's history:History of Present Illness     Patient Active Problem List   Diagnosis   • CTS (carpal tunnel syndrome)   • DDD (degenerative disc disease), lumbar   • HNP (herniated nucleus pulposus), lumbar   • Anxiety and depression   • Tobacco abuse counseling   • Current every day smoker   • Lumbar facet arthropathy   • Lumbar disc disease with radiculopathy   • Lumbar foraminal stenosis     Past Medical History:   Diagnosis Date   • Breast cancer (CMS/[facility])      LEFT    • Drug therapy    • Radiation       Past Surgical History:   Procedure Laterality Date   • BREAST BIOPSY     •  SECTION     • HYSTERECTOMY     • MASTECTOMY      BILATERAL   • OTHER SURGICAL HISTORY  2018    stones removed from submandibular gland       Family History   Problem Relation Age of Onset   • COPD Mother    • No Known Problems Father    • No Known Problems Sister    • No Known Problems Brother    • No Known Problems Daughter    • No Known Problems Son    • Stroke Maternal Grandmother    • No Known Problems Paternal Grandmother    • No Known Problems Maternal Aunt    • No Known Problems Paternal Aunt      Social History     Socioeconomic History   • Marital status:      Spouse name: Not on file   • Number of children: Not on file   • Years of education: Not on file   • Highest  education level: Not on file   Tobacco Use   • Smoking status: Current Every Day Smoker     Packs/day: 1.00     Years: 30.00     Pack years: 30.00     Types: Cigarettes   • Smokeless tobacco: Never Used   Substance and Sexual Activity   • Alcohol use: No   • Drug use: No   • Sexual activity: Defer      Current Outpatient Medications on File Prior to Visit   Medication Sig Dispense Refill   • acyclovir (ZOVIRAX) 400 MG tablet Take 1 tablet by mouth 2 (Two) Times a Day. 60 tablet 5   • ALPRAZolam (XANAX) 0.5 MG tablet Take 0.5 mg by mouth 2 (Two) Times a Day As Needed for Anxiety.     • ALPRAZolam (XANAX) 0.5 MG tablet Take 2 tablets by mouth 2 (Two) Times a Day. 360 tablet 0   • ALPRAZolam (XANAX) 0.5 MG tablet Take 2 tablets by mouth 2 (Two) Times a Day. 360 tablet 0   • ALPRAZolam (XANAX) 0.5 MG tablet Take 2 tablets by mouth 2 (Two) Times a Day. 60 tablet 0   • gabapentin (NEURONTIN) 100 MG capsule Take 1 capsule by mouth 3 (Three) Times a Day. 270 capsule 0   • Multiple Vitamins-Minerals (MULTIVITAMIN ADULT PO) Take  by mouth Daily.     • mupirocin (BACTROBAN) 2 % ointment Apply to surgery site daily with bandage change 22 g 1   • naproxen sodium (ALEVE) 220 MG tablet Take 220 mg by mouth 2 (Two) Times a Day As Needed.     • ofloxacin (FLOXIN) 0.3 % otic solution Administer 10 drops into ear(s) as directed by provider Daily. 5 mL 5   • raNITIdine (ZANTAC) 75 MG tablet Take 150 mg by mouth Daily.     • venlafaxine XR (EFFEXOR-XR) 150 MG 24 hr capsule Take 1 capsule by mouth Daily. 30 capsule 5     No current facility-administered medications on file prior to visit.       Allergies   Allergen Reactions   • Codeine Nausea Only   • Metoclopramide Unknown (See Comments)   • Penicillins Unknown (See Comments)   • Talwin [Pentazocine] Other (See Comments)     Oral swelling.         Review of Systems   Constitutional: Negative for activity change, appetite change, chills, diaphoresis, fatigue, fever and unexpected weight  "change.   HENT: Negative for congestion, dental problem, drooling, ear discharge, ear pain, facial swelling, hearing loss, mouth sores, nosebleeds, postnasal drip, rhinorrhea, sinus pressure, sneezing, sore throat, tinnitus, trouble swallowing and voice change.    Eyes: Negative for photophobia, pain, discharge, redness, itching and visual disturbance.   Respiratory: Negative for apnea, cough, choking, chest tightness, shortness of breath, wheezing and stridor.    Cardiovascular: Negative for chest pain, palpitations and leg swelling.   Gastrointestinal: Negative for abdominal distention, abdominal pain, anal bleeding, blood in stool, constipation, diarrhea, nausea, rectal pain and vomiting.   Endocrine: Negative for cold intolerance, heat intolerance, polydipsia, polyphagia and polyuria.   Genitourinary: Negative for decreased urine volume, difficulty urinating, dysuria, enuresis, flank pain, frequency, genital sores, hematuria and urgency.   Musculoskeletal: Positive for arthralgias. Negative for back pain, gait problem, joint swelling, myalgias, neck pain and neck stiffness.   Skin: Negative for color change, pallor, rash and wound.   Allergic/Immunologic: Negative for environmental allergies, food allergies and immunocompromised state.   Neurological: Negative for dizziness, tremors, seizures, syncope, facial asymmetry, speech difficulty, weakness, light-headedness, numbness and headaches.   Hematological: Negative for adenopathy. Does not bruise/bleed easily.   Psychiatric/Behavioral: Negative for agitation, behavioral problems, confusion, decreased concentration, dysphoric mood, hallucinations, self-injury, sleep disturbance and suicidal ideas. The patient is not nervous/anxious and is not hyperactive.         Objective      Physical Exam  Pulse 98   Ht 160 cm (62.99\")   Wt 57.2 kg (126 lb 1.7 oz)   SpO2 98%   BMI 22.34 kg/m²     Body mass index is 22.34 kg/m².    General:   Mental Status:  " Alert   Appearance: Cooperative, in no acute distress   Build and Nutrition: Thin female   Orientation: Alert and oriented to person, place and time   Posture: Normal   Gait: Normal    Upper Extremities:              Right Shoulder:                          Tenderness:    None                          Swelling:          None                          Crepitus:          None                          Atrophy:           None                          Range of motion:        External rotation:         70°                                                              Forward flexion:          170°                                                              Abduction:                   170°  Instability:        None  Deformities:     None  Functional testing: Negative drop arm, negative lift-off,  negative impingement              Left Shoulder:                          Tenderness:    None                          Swelling:          None                          Crepitus:          None                          Atrophy:           None                          Range of motion:        External rotation:         70°                                                              Forward flexion:          170°                                                              Abduction:                   160°  Instability:        None  Deformities:     None  Functional testing: Negative drop arm, negative lift-off, positive impingement    Imaging/Studies    EXAMINATION: MRI SHOULDER RIGHT WO CONTRAST- 07/13/2020     INDICATION: Shoulder pain, prior xray, rotator cuff tear / impingement  suspected; M75.41-Impingement syndrome of right shoulder;  M75.42-Impingement syndrome of left shoulder      TECHNIQUE: Multiplanar MRI of the right shoulder without intravenous  contrast     COMPARISON: Shoulder radiographs dated 09/05/2018     FINDINGS: Osseous structures demonstrate moderate DJD of the AC joint  interval without interval widening.  Subacromial/subdeltoid fluid with  components of mass effect on the underlying myotendinous junction  supraspinatus however no increased signal at the myotendinous junction.  No acute fracture or aggressive bone marrow signal findings of cystic  degeneration of the humeral head and degenerative findings at the  greater tuberosity humerus.     Rotator cuff: Moderate hyperintense T2 signal within the distal  supraspinous fibers of a midportion and slight posterior predominance  articular sided of high-grade partial-thickness tearing without  full-thickness involvement or retraction. Minimal fatty infiltration  without atrophy of the muscle belly clearly evident. Infraspinous has  minimal increased signal of likely tendinopathy or minimal partial  thickness tearing without significant involvement or retraction.  Subscapularis intact and unremarkable. Long head of the biceps  well-seated within the bicipital groove. Teres minor intact.     On the non arthrographic evaluation there is noted tearing of the  anterior superior glenoid labrum with a slight anterior to posterior  directionality of SLAP tearing without periosteal stripping or osseous  fragmentation involvement. Questionable mild inferior capsular  thickening however quadrilateral space and axillary pouch otherwise  unremarkable with perineural fat preserved.     IMPRESSION:  1. High-grade partial thickness tearing supraspinatus with fraying and  involvement of the midportion and posterior fibers of an articular sided  predominance without retraction demonstrating mild fatty infiltration.  No muscle belly atrophy.  2. SLAP tearing configuration of the anterior superior glenoid labrum.  3. Moderate DJD of the AC joint with associated small volume  subacromial/subdeltoid fluid and minimal mass effect on the underlying  supraspinatus myotendinous junction.      D:  07/13/2020  E:  07/14/2020     This report was finalized on 7/14/2020 6:58 PM by Dr. Chou  Key.      EXAMINATION: MRI SHOULDER, LEFT, WO CONTRAST-07/13/2020:     INDICATION: Shoulder pain, prior x-ray, rotator cuff tear / impingement  suspected; M75.41-Impingement syndrome of right shoulder;  M75.42-Impingement syndrome of left shoulder.      TECHNIQUE: Multiplanar MRI of the left shoulder without intravenous  contrast.     COMPARISON: MRI dated 04/20/2016.     FINDINGS: The osseous structures demonstrate moderate DJD of the AC  joint without significant mass effect on the underlying myotendinous  junction supraspinatus. Cystic degenerative changes at the greater  tuberosity of humerus without acute fracture or aggressive bone marrow  signal findings otherwise noted.     Rotator cuff: Moderate hyperintense T2 signal involving the supraspinous  fibers of a posterior predominance articular sided partial-thickness  tearing with mild fatty infiltration, however, no significant muscle  belly atrophy. Infraspinous has minimal signal of likely tendinopathy  without retraction or significant tearing. Subscapularis intact. Long  head of the biceps well seated within the bicipital groove. Teres minor  intact. SLAP tearing of the anterior-superior glenoid labrum as well as  chronic tearing of the posterior labrum again noted from prior  comparison without significant interval progression or periosteal  stripping/fragmentation. Fluid signal within the rotator interval along  with at least mild inferior capsular thickening, however, quadrilateral  space and axillary pouch otherwise unremarkable with preserved  perineural fat.     IMPRESSION:  1. Compared to 2016 examination, there has been at least mild interval  progression in signal thickness tearing supraspinatus or acute on  chronic findings of tearing without retraction.     2. Labral tearing of the anterior and posterior glenoid labrum as  detailed above in similar appearance to 2016 exam where this was  identified as well.     3. Moderate DJD AC joint interval  with trace subacromial/subdeltoid  fluid.     4. Rotator cuff fluid signal is increased from 2016 exam along with at  least mild inferior capsular thickening, stable to slightly increased  from prior comparison 2016, concerning for adhesive capsulitis  configuration in the appropriate setting.      D:  07/13/2020  E:  07/14/2020     This report was finalized on 7/14/2020 6:57 PM by Dr. José Antonio Abreu.      Assessment and Plan     Yeni was seen today for follow-up.    Diagnoses and all orders for this visit:    Rotator cuff syndrome of both shoulders        1. Rotator cuff syndrome of both shoulders        I reviewed my findings with patient today.  She does have rotator cuff pathology in both shoulders, as well as other degenerative changes.  She responded well to the injection her last visit, and has no significant pain today.  At this point, we will see her back in 3 months for recheck, but sooner for any problems.    Return in about 3 months (around 10/15/2020).    Medical Decision Making  Data/Risk: independent visualization of imaging, lab tests, or EMG/NCV      Reginaldo Virk MD  07/15/20  16:20    Dragon disclaimer:  Much of this encounter note is an electronic transcription/translation of spoken language to printed text. The electronic translation of spoken language may permit erroneous, or at times, nonsensical words or phrases to be inadvertently transcribed; Although I have reviewed the note for such errors, some may still exist.

## 2020-12-09 ENCOUNTER — OFFICE VISIT (OUTPATIENT)
Dept: ORTHOPEDIC SURGERY | Facility: CLINIC | Age: 60
End: 2020-12-09

## 2020-12-09 VITALS — BODY MASS INDEX: 21.97 KG/M2 | OXYGEN SATURATION: 99 % | HEART RATE: 81 BPM | HEIGHT: 63 IN | WEIGHT: 124 LBS

## 2020-12-09 DIAGNOSIS — M75.102 ROTATOR CUFF SYNDROME OF BOTH SHOULDERS: Primary | ICD-10-CM

## 2020-12-09 DIAGNOSIS — M75.101 ROTATOR CUFF SYNDROME OF BOTH SHOULDERS: Primary | ICD-10-CM

## 2020-12-09 NOTE — PROGRESS NOTES
[facility] Note    Subjective     Chief Complaint   Patient presents with   • Follow-up     5 month f/u; Rotator cuff syndrome of both shoulders  (cortisone injection 20)        HPI    It has been 5  month(s) since Ms. Olivo's last visit. She returns to clinic today for follow-up of bilateral shoulder pain. She rates her pain a 7/10 on the pain scale. Previous/current treatments: NSAIDS, physical therapy and steroid injection (last injection 20). Current symptoms: pain, popping and stiffness. The pain is worse with sleeping, working and leisure; cortisone injections improve the pain. Overall, she is doing better.  She would like injections today.  Previous injections have provided relief.    I have reviewed the following portions of the patient's history and agree with: History of Present Illness and Review of Systems    Patient Active Problem List   Diagnosis   • CTS (carpal tunnel syndrome)   • DDD (degenerative disc disease), lumbar   • HNP (herniated nucleus pulposus), lumbar   • Anxiety and depression   • Tobacco abuse counseling   • Current every day smoker   • Lumbar facet arthropathy   • Lumbar disc disease with radiculopathy   • Lumbar foraminal stenosis     Past Medical History:   Diagnosis Date   • Breast cancer (CMS/[facility])      LEFT    • Drug therapy    • Radiation       Past Surgical History:   Procedure Laterality Date   • BREAST BIOPSY     •  SECTION     • HYSTERECTOMY     • MASTECTOMY      BILATERAL   • OTHER SURGICAL HISTORY  2018    stones removed from submandibular gland       Family History   Problem Relation Age of Onset   • COPD Mother    • No Known Problems Father    • No Known Problems Sister    • No Known Problems Brother    • No Known Problems Daughter    • No Known Problems Son    • Stroke Maternal Grandmother    • No Known Problems Paternal Grandmother    • No Known Problems Maternal Aunt    • No Known Problems Paternal Aunt      Social  History     Socioeconomic History   • Marital status:      Spouse name: Not on file   • Number of children: Not on file   • Years of education: Not on file   • Highest education level: Not on file   Tobacco Use   • Smoking status: Current Every Day Smoker     Packs/day: 1.00     Years: 30.00     Pack years: 30.00     Types: Cigarettes   • Smokeless tobacco: Never Used   Substance and Sexual Activity   • Alcohol use: No   • Drug use: No   • Sexual activity: Defer      Current Outpatient Medications on File Prior to Visit   Medication Sig Dispense Refill   • acyclovir (ZOVIRAX) 400 MG tablet Take 1 tablet by mouth 2 (Two) Times a Day. 60 tablet 5   • acyclovir (ZOVIRAX) 400 MG tablet Take 1 tablet by mouth 2 (two) times a day. 180 tablet 1   • ALPRAZolam (XANAX) 0.5 MG tablet Take 0.5 mg by mouth 2 (Two) Times a Day As Needed for Anxiety.     • ALPRAZolam (XANAX) 0.5 MG tablet Take 2 tablets by mouth 2 (Two) Times a Day. 360 tablet 0   • ALPRAZolam (XANAX) 0.5 MG tablet Take 2 tablets by mouth 2 (Two) Times a Day. 360 tablet 0   • ALPRAZolam (XANAX) 0.5 MG tablet Take 2 tablets by mouth 2 (Two) Times a Day. 60 tablet 0   • ALPRAZolam (XANAX) 0.5 MG tablet Take 2 tablets by mouth 2 (two) times a day. 60 tablet 2   • ALPRAZolam (XANAX) 0.5 MG tablet Take 1 tablet by mouth 2 (two) times a day. 60 tablet 2   • gabapentin (NEURONTIN) 100 MG capsule Take 1 capsule by mouth 3 (Three) Times a Day. 270 capsule 0   • Multiple Vitamins-Minerals (MULTIVITAMIN ADULT PO) Take  by mouth Daily.     • mupirocin (BACTROBAN) 2 % ointment Apply to surgery site daily with bandage change 22 g 1   • naproxen sodium (ALEVE) 220 MG tablet Take 220 mg by mouth 2 (Two) Times a Day As Needed.     • ofloxacin (FLOXIN) 0.3 % otic solution Administer 10 drops into ear(s) as directed by provider Daily. 5 mL 5   • raNITIdine (ZANTAC) 75 MG tablet Take 150 mg by mouth Daily.     • venlafaxine XR (EFFEXOR-XR) 150 MG 24 hr capsule Take 1 capsule  by mouth Daily. 90 capsule 1     No current facility-administered medications on file prior to visit.       Allergies   Allergen Reactions   • Codeine Nausea Only   • Metoclopramide Unknown (See Comments)   • Penicillins Unknown (See Comments)   • Talwin [Pentazocine] Other (See Comments)     Oral swelling.         Review of Systems   Constitutional: Negative for activity change, appetite change, chills, diaphoresis, fatigue, fever and unexpected weight change.   HENT: Negative for congestion, dental problem, drooling, ear discharge, ear pain, facial swelling, hearing loss, mouth sores, nosebleeds, postnasal drip, rhinorrhea, sinus pressure, sneezing, sore throat, tinnitus, trouble swallowing and voice change.    Eyes: Negative for photophobia, pain, discharge, redness, itching and visual disturbance.   Respiratory: Negative for apnea, cough, choking, chest tightness, shortness of breath, wheezing and stridor.    Cardiovascular: Negative for chest pain, palpitations and leg swelling.   Gastrointestinal: Negative for abdominal distention, abdominal pain, anal bleeding, blood in stool, constipation, diarrhea, nausea, rectal pain and vomiting.   Endocrine: Negative for cold intolerance, heat intolerance, polydipsia, polyphagia and polyuria.   Genitourinary: Negative for decreased urine volume, difficulty urinating, dysuria, enuresis, flank pain, frequency, genital sores, hematuria and urgency.   Musculoskeletal: Positive for arthralgias. Negative for back pain, gait problem, joint swelling, myalgias, neck pain and neck stiffness.   Skin: Negative for color change, pallor, rash and wound.   Allergic/Immunologic: Negative for environmental allergies, food allergies and immunocompromised state.   Neurological: Negative for dizziness, tremors, seizures, syncope, facial asymmetry, speech difficulty, weakness, light-headedness, numbness and headaches.   Hematological: Negative for adenopathy. Does not bruise/bleed easily.    "  Psychiatric/Behavioral: Negative for agitation, behavioral problems, confusion, decreased concentration, dysphoric mood, hallucinations, self-injury, sleep disturbance and suicidal ideas. The patient is not nervous/anxious and is not hyperactive.         Objective      Physical Exam  Pulse 81   Ht 160 cm (62.99\")   Wt 56.2 kg (124 lb)   SpO2 99%   BMI 21.97 kg/m²     Body mass index is 21.97 kg/m².    General:   Mental Status:  Alert   Appearance: Cooperative, in no acute distress   Build and Nutrition: Well-nourished well-developed female   Orientation: Alert and oriented to person, place and time   Posture: Normal   Gait: Normal    Upper Extremities:              Right Shoulder:                          Tenderness:    None                          Swelling:          None                          Crepitus:          None                          Atrophy:           None                          Range of motion:        External rotation:         70°                                                              Forward flexion:          170°                                                              Abduction:                   170°  Instability:        None  Deformities:     None  Functional testing: Negative drop arm, negative lift-off, positive impingement              Left Shoulder:                          Tenderness:    None                          Swelling:          None                          Crepitus:          None                          Atrophy:           None                          Range of motion:        External rotation:         70°                                                              Forward flexion:          170°                                                              Abduction:                   160°  Instability:        None  Deformities:     None  Functional testing: Negative drop arm, negative lift-off, positive impingement      Assessment and Plan     Diagnoses and all " orders for this visit:    1. Rotator cuff syndrome of both shoulders (Primary)        1. Rotator cuff syndrome of both shoulders        Reviewed my findings with the patient today.  Both of her shoulders are bothering her and she would like repeat injections today.  Both shoulders were injected, and I would like for her to see Dr. Price for an evaluation in 3 months.  She may want to consider arthroscopic intervention.    Procedure Note:  The potential benefits of performing a therapeutic left and right shoulder subacromial bursal injections, as well as potential risks (including, but not limited to infection, swelling, pain, bleeding, bruising, nerve/blood vessel damage, skin color changes, transient elevation in blood glucose levels, and fat atrophy) were discussed with the patient.  After informed consent, timeout procedure was performed, and the skin on the right and left shoulders were prepped with chlorhexidine soap and alcohol, after which ethyl chloride was applied to the skin at the injection site. Via the posterior approach, 1ml of Kenalog 40mg/ml mixed with 4ml 0.5% ropivacaine plain was injected into the subacromial bursae.  The patient tolerated the procedure well, experiencing 97.5% improvement a few minutes following the injections.  There were no complications.  Band-Aid was applied to the injection site. Post-procedural instructions were given to the patient and/or their caregiver.      Return in about 3 months (around 3/9/2021) for Referral to Dr. Price.    Medical Decision Making  Management Options : prescription/IM medicine    Reginaldo Virk MD  12/09/20  16:33 SHIVANI Chopra disclaimer:  Much of this encounter note is an electronic transcription/translation of spoken language to printed text. The electronic translation of spoken language may permit erroneous, or at times, nonsensical words or phrases to be inadvertently transcribed; Although I have reviewed the note for such errors, some may  still exist.

## 2020-12-16 PROCEDURE — 20610 DRAIN/INJ JOINT/BURSA W/O US: CPT | Performed by: ORTHOPAEDIC SURGERY

## 2020-12-16 RX ORDER — ROPIVACAINE HYDROCHLORIDE 5 MG/ML
4 INJECTION, SOLUTION EPIDURAL; INFILTRATION; PERINEURAL
Status: COMPLETED | OUTPATIENT
Start: 2020-12-16 | End: 2020-12-16

## 2020-12-16 RX ORDER — TRIAMCINOLONE ACETONIDE 40 MG/ML
40 INJECTION, SUSPENSION INTRA-ARTICULAR; INTRAMUSCULAR
Status: COMPLETED | OUTPATIENT
Start: 2020-12-16 | End: 2020-12-16

## 2020-12-16 RX ADMIN — ROPIVACAINE HYDROCHLORIDE 4 ML: 5 INJECTION, SOLUTION EPIDURAL; INFILTRATION; PERINEURAL at 13:31

## 2020-12-16 RX ADMIN — TRIAMCINOLONE ACETONIDE 40 MG: 40 INJECTION, SUSPENSION INTRA-ARTICULAR; INTRAMUSCULAR at 13:31

## 2020-12-16 NOTE — PROGRESS NOTES
Procedure   Large Joint Arthrocentesis: R subacromial bursa  Date/Time: 12/16/2020 1:31 PM  Consent given by: patient  Site marked: site marked  Timeout: Immediately prior to procedure a time out was called to verify the correct patient, procedure, equipment, support staff and site/side marked as required   Supporting Documentation  Indications: pain   Procedure Details  Location: shoulder - R subacromial bursa  Preparation: Patient was prepped and draped in the usual sterile fashion  Needle size: 22 G  Approach: anterolateral  Medications administered: 40 mg triamcinolone acetonide 40 MG/ML; 4 mL ropivacaine 0.5 %  Patient tolerance: patient tolerated the procedure well with no immediate complications    Large Joint Arthrocentesis: L subacromial bursa  Date/Time: 12/16/2020 1:31 PM  Consent given by: patient  Site marked: site marked  Timeout: Immediately prior to procedure a time out was called to verify the correct patient, procedure, equipment, support staff and site/side marked as required   Supporting Documentation  Indications: pain   Procedure Details  Location: shoulder - L subacromial bursa  Preparation: Patient was prepped and draped in the usual sterile fashion  Needle size: 22 G  Approach: posterior  Medications administered: 4 mL ropivacaine 0.5 %; 40 mg triamcinolone acetonide 40 MG/ML  Patient tolerance: patient tolerated the procedure well with no immediate complications

## 2021-01-07 ENCOUNTER — IMMUNIZATION (OUTPATIENT)
Dept: VACCINE CLINIC | Facility: HOSPITAL | Age: 61
End: 2021-01-07

## 2021-01-07 PROCEDURE — 91300 HC SARSCOV02 VAC 30MCG/0.3ML IM: CPT

## 2021-01-07 PROCEDURE — 0001A: CPT

## 2021-01-28 ENCOUNTER — IMMUNIZATION (OUTPATIENT)
Dept: VACCINE CLINIC | Facility: HOSPITAL | Age: 61
End: 2021-01-28

## 2021-01-28 PROCEDURE — 91300 HC SARSCOV02 VAC 30MCG/0.3ML IM: CPT | Performed by: INTERNAL MEDICINE

## 2021-01-28 PROCEDURE — 0002A: CPT | Performed by: INTERNAL MEDICINE

## 2021-02-12 ENCOUNTER — OFFICE VISIT (OUTPATIENT)
Dept: FAMILY MEDICINE CLINIC | Facility: CLINIC | Age: 61
End: 2021-02-12

## 2021-02-12 VITALS
BODY MASS INDEX: 22.15 KG/M2 | HEART RATE: 97 BPM | HEIGHT: 63 IN | TEMPERATURE: 98.2 F | OXYGEN SATURATION: 97 % | DIASTOLIC BLOOD PRESSURE: 85 MMHG | WEIGHT: 125 LBS | SYSTOLIC BLOOD PRESSURE: 125 MMHG

## 2021-02-12 DIAGNOSIS — Z13.29 SCREENING FOR THYROID DISORDER: ICD-10-CM

## 2021-02-12 DIAGNOSIS — Z13.220 SCREENING FOR CHOLESTEROL LEVEL: ICD-10-CM

## 2021-02-12 DIAGNOSIS — Z13.1 SCREENING FOR DIABETES MELLITUS: ICD-10-CM

## 2021-02-12 DIAGNOSIS — F41.9 ANXIETY AND DEPRESSION: ICD-10-CM

## 2021-02-12 DIAGNOSIS — F17.210 CIGARETTE SMOKER: ICD-10-CM

## 2021-02-12 DIAGNOSIS — F32.A ANXIETY AND DEPRESSION: ICD-10-CM

## 2021-02-12 DIAGNOSIS — E55.9 VITAMIN D DEFICIENCY: ICD-10-CM

## 2021-02-12 DIAGNOSIS — M51.26 DISPLACEMENT OF LUMBAR INTERVERTEBRAL DISC WITHOUT MYELOPATHY: ICD-10-CM

## 2021-02-12 DIAGNOSIS — Z76.89 ENCOUNTER TO ESTABLISH CARE: Primary | ICD-10-CM

## 2021-02-12 DIAGNOSIS — Z13.0 SCREENING FOR DEFICIENCY ANEMIA: ICD-10-CM

## 2021-02-12 PROCEDURE — 99204 OFFICE O/P NEW MOD 45 MIN: CPT | Performed by: PHYSICIAN ASSISTANT

## 2021-02-12 RX ORDER — OMEGA-3 FATTY ACIDS/FISH OIL 300-1000MG
CAPSULE ORAL
COMMUNITY
End: 2022-10-21 | Stop reason: ALTCHOICE

## 2021-02-12 RX ORDER — ACETAMINOPHEN 500 MG
500 TABLET ORAL EVERY 6 HOURS PRN
COMMUNITY

## 2021-02-12 RX ORDER — ACYCLOVIR 400 MG/1
400 TABLET ORAL 2 TIMES DAILY
Qty: 180 TABLET | Refills: 1 | Status: SHIPPED | OUTPATIENT
Start: 2021-02-12

## 2021-02-12 RX ORDER — ALPRAZOLAM 0.5 MG/1
0.5 TABLET ORAL 2 TIMES DAILY PRN
Qty: 60 TABLET | Refills: 0 | Status: SHIPPED | OUTPATIENT
Start: 2021-02-12 | End: 2021-04-14 | Stop reason: SDUPTHER

## 2021-02-12 RX ORDER — VENLAFAXINE HYDROCHLORIDE 150 MG/1
150 CAPSULE, EXTENDED RELEASE ORAL DAILY
Qty: 90 CAPSULE | Refills: 1 | Status: SHIPPED | OUTPATIENT
Start: 2021-02-12 | End: 2021-05-13 | Stop reason: SDUPTHER

## 2021-02-12 NOTE — PROGRESS NOTES
Chief Complaint   Patient presents with   • Establish Care   • Insomnia       HPI     Yeni Olivo is a 60 y.o. female who presents to establish care.  Patient is quitting her job at [employer] and is going to be starting a new job working from home for [employer].  She was seeing a PCP who recently moved to [place].  She presents for management of her anxiety, depression and insomnia.  Patient reports improvement in depression with her venlafaxine.  She continues to have daily anxiety and is currently managing this with xanax 0.5 mg BID.  She takes the xanax at night to help her sleep.  She is a breast cancer survivor.  She is no longer followed by oncology.  She had bilateral mastectomy in .  She had colonoscopy in 2018.  Had full hysterectomy, not followed by gynecology.  She declines pneumonia vaccine today, will plan on getting this at [facility] pharmacy.      Chief Complaint   Patient presents with   • Establish Care   • Insomnia       Past Medical History:   Diagnosis Date   • Arthritis    • Breast cancer (CMS/HCC)      LEFT    • Drug therapy    • Radiation        Past Surgical History:   Procedure Laterality Date   • BREAST BIOPSY     •  SECTION     • HYSTERECTOMY     • MASTECTOMY      BILATERAL   • OTHER SURGICAL HISTORY  2018    stones removed from submandibular gland        Family History   Problem Relation Age of Onset   • COPD Mother    • Diabetes Mother    • No Known Problems Father    • Multiple sclerosis Sister    • No Known Problems Brother    • No Known Problems Daughter    • No Known Problems Son    • Stroke Maternal Grandmother    • No Known Problems Paternal Grandmother    • No Known Problems Maternal Aunt    • No Known Problems Paternal Aunt        Social History     Socioeconomic History   • Marital status:      Spouse name: Not on file   • Number of children: 2   • Years of education: Not on file   • Highest education level: Not on file   Occupational History   •  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        ROS    Review of Systems   Constitutional: Positive for fatigue. Negative for activity change, appetite change, chills, diaphoresis, fever, unexpected weight gain and unexpected weight loss.   HENT: Negative for congestion, dental problem, ear pain, hearing loss, nosebleeds, sinus pressure, sore throat and trouble swallowing.    Eyes: Negative for blurred vision, pain, redness and visual disturbance.   Respiratory: Negative for apnea, cough, chest tightness, shortness of breath and wheezing.    Cardiovascular: Negative for chest pain, palpitations and leg swelling.   Gastrointestinal: Negative for abdominal distention, abdominal pain, anal bleeding, blood in stool, constipation, diarrhea, nausea, vomiting, GERD and indigestion.   Endocrine: Negative for cold intolerance, heat intolerance, polydipsia, polyphagia and polyuria.   Genitourinary: Negative for decreased urine volume, difficulty urinating, dysuria, frequency, hematuria, urgency and urinary incontinence.   Musculoskeletal: Negative for gait problem, joint swelling and bursitis.   Skin: Negative for dry skin, rash, skin lesions and bruise.   Neurological: Negative for dizziness, tremors, seizures, syncope, speech difficulty, weakness, light-headedness, headache, memory problem and confusion.   Hematological: Does not bruise/bleed easily.   Psychiatric/Behavioral:  "Positive for agitation, sleep disturbance, depressed mood and stress. Negative for behavioral problems, decreased concentration, hallucinations and suicidal ideas. The patient is nervous/anxious.        Vitals:    02/12/21 1208   BP: 125/85   Pulse: 97   Temp: 98.2 °F (36.8 °C)   SpO2: 97%   Weight: 56.7 kg (125 lb)   Height: 160 cm (63\")   PainSc: 0-No pain     Body mass index is 22.14 kg/m².    Current Outpatient Medications on File Prior to Visit   Medication Sig Dispense Refill   • acetaminophen (TYLENOL) 500 MG tablet Take 500 mg by mouth Every 6 (Six) Hours As Needed for Mild Pain . Pt states she takes 2 in the AM & 1 in the PM     • Ibuprofen 200 MG capsule Take  by mouth. Pt states she takes 3 in the AM & 2 in the PM       No current facility-administered medications on file prior to visit.        Results for orders placed or performed in visit on 10/22/18   Body Fluid Culture - Body Fluid, Hip, Right    Specimen: Hip, Right; Body Fluid   Result Value Ref Range    Body Fluid Culture No growth at 7 days     Gram Stain Occasional WBCs seen     Gram Stain No organisms seen    Crystal Exam, Fluid - Synovial Fluid,    Specimen: Synovial Fluid   Result Value Ref Range    Crystals, Fluid No crystals seen        PE  Physical Exam  Vitals signs reviewed.   Constitutional:       General: She is not in acute distress.     Appearance: Normal appearance. She is well-developed and normal weight. She is not ill-appearing or diaphoretic.   HENT:      Head: Normocephalic and atraumatic.   Eyes:      Extraocular Movements: Extraocular movements intact.      Conjunctiva/sclera: Conjunctivae normal.   Neck:      Musculoskeletal: Normal range of motion.   Cardiovascular:      Rate and Rhythm: Normal rate and regular rhythm.      Heart sounds: Normal heart sounds.   Pulmonary:      Effort: Pulmonary effort is normal.      Breath sounds: Normal breath sounds.   Musculoskeletal: Normal range of motion.      Right lower leg: No " edema.      Left lower leg: No edema.   Skin:     General: Skin is warm.      Findings: No erythema or rash.   Neurological:      General: No focal deficit present.      Mental Status: She is alert.   Psychiatric:         Attention and Perception: Attention and perception normal. She is attentive.         Mood and Affect: Mood and affect normal.         Speech: Speech normal.         Behavior: Behavior normal. Behavior is cooperative.         Thought Content: Thought content normal.         Cognition and Memory: Cognition and memory normal.         Judgment: Judgment normal.         A/P    Diagnoses and all orders for this visit:    1. Encounter to establish care (Primary)    2. Anxiety and depression  -     Ambulatory Referral to Behavioral Health  -     ALPRAZolam (XANAX) 0.5 MG tablet; Take 1 tablet by mouth 2 (Two) Times a Day As Needed for Anxiety.  Dispense: 60 tablet; Refill: 0  -     venlafaxine XR (EFFEXOR-XR) 150 MG 24 hr capsule; Take 1 capsule by mouth Daily.  Dispense: 90 capsule; Refill: 1  Stable mood and anxiety with current medications.  Advised patient that our office is unable to manage her xanax.  She is almost out of medication and has been on this for years.  Will refill for 30 days and no more.  Referral placed for psychiatrist.    3. Displacement of lumbar intervertebral disc without myelopathy  Was previously on gabapentin, no longer taking medication because it is not helping.    4. Cigarette smoker  -      CT Chest Low Dose Cancer Screening WO; Future  Patient smokes 1 pack/day for 30 years.  She quit previously with chantix, declines prescription or wanting to quit today.  Patient has never had CT chest and is willing to be screened.  Order placed.    5. Vitamin D deficiency  -     Vitamin D 25 Hydroxy; Future    6. Screening for thyroid disorder  -     TSH Rfx On Abnormal To Free T4; Future    7. Screening for deficiency anemia  -     CBC Auto Differential; Future    8. Screening for  cholesterol level  -     Lipid Panel; Future    9. Screening for diabetes mellitus  -     Comprehensive Metabolic Panel; Future    Other orders  -     acyclovir (ZOVIRAX) 400 MG tablet; Take 1 tablet by mouth 2 (two) times a day.  Dispense: 180 tablet; Refill: 1         Plan of care reviewed with patient at the conclusion of today's visit. Education was provided regarding diagnosis, management and any prescribed or recommended OTC medications.  Patient verbalizes understanding of and agreement with management plan.    Return in about 6 months (around 8/12/2021) for Recheck, 6 month follow-up.     Delmis Goyal PA-C

## 2021-02-18 ENCOUNTER — OFFICE VISIT (OUTPATIENT)
Dept: ORTHOPEDIC SURGERY | Facility: CLINIC | Age: 61
End: 2021-02-18

## 2021-02-18 VITALS — HEIGHT: 63 IN | BODY MASS INDEX: 22.15 KG/M2 | HEART RATE: 80 BPM | OXYGEN SATURATION: 98 % | WEIGHT: 125 LBS

## 2021-02-18 DIAGNOSIS — M75.52 BURSITIS OF LEFT SHOULDER: ICD-10-CM

## 2021-02-18 DIAGNOSIS — M75.21 BICEPS TENDINITIS OF RIGHT UPPER EXTREMITY: ICD-10-CM

## 2021-02-18 DIAGNOSIS — M75.51 BURSITIS OF RIGHT SHOULDER: ICD-10-CM

## 2021-02-18 DIAGNOSIS — M75.22 BICEPS TENDINITIS OF LEFT UPPER EXTREMITY: Primary | ICD-10-CM

## 2021-02-18 DIAGNOSIS — M75.111 NONTRAUMATIC INCOMPLETE TEAR OF RIGHT ROTATOR CUFF: ICD-10-CM

## 2021-02-18 DIAGNOSIS — M75.42 IMPINGEMENT SYNDROME OF LEFT SHOULDER: ICD-10-CM

## 2021-02-18 PROCEDURE — 20550 NJX 1 TENDON SHEATH/LIGAMENT: CPT | Performed by: ORTHOPAEDIC SURGERY

## 2021-02-18 PROCEDURE — 99214 OFFICE O/P EST MOD 30 MIN: CPT | Performed by: ORTHOPAEDIC SURGERY

## 2021-02-18 RX ORDER — METHYLPREDNISOLONE ACETATE 80 MG/ML
80 INJECTION, SUSPENSION INTRA-ARTICULAR; INTRALESIONAL; INTRAMUSCULAR; SOFT TISSUE
Status: COMPLETED | OUTPATIENT
Start: 2021-02-18 | End: 2021-02-18

## 2021-02-18 RX ORDER — LIDOCAINE HYDROCHLORIDE 10 MG/ML
5 INJECTION, SOLUTION EPIDURAL; INFILTRATION; INTRACAUDAL; PERINEURAL
Status: COMPLETED | OUTPATIENT
Start: 2021-02-18 | End: 2021-02-18

## 2021-02-18 RX ADMIN — LIDOCAINE HYDROCHLORIDE 5 ML: 10 INJECTION, SOLUTION EPIDURAL; INFILTRATION; INTRACAUDAL; PERINEURAL at 15:57

## 2021-02-18 RX ADMIN — LIDOCAINE HYDROCHLORIDE 5 ML: 10 INJECTION, SOLUTION EPIDURAL; INFILTRATION; INTRACAUDAL; PERINEURAL at 15:59

## 2021-02-18 RX ADMIN — METHYLPREDNISOLONE ACETATE 80 MG: 80 INJECTION, SUSPENSION INTRA-ARTICULAR; INTRALESIONAL; INTRAMUSCULAR; SOFT TISSUE at 15:59

## 2021-02-18 RX ADMIN — METHYLPREDNISOLONE ACETATE 80 MG: 80 INJECTION, SUSPENSION INTRA-ARTICULAR; INTRALESIONAL; INTRAMUSCULAR; SOFT TISSUE at 15:57

## 2021-02-18 NOTE — PROGRESS NOTES
Procedure   Large Joint Arthrocentesis  Date/Time: 2/18/2021 3:57 PM  Consent given by: patient  Site marked: site marked  Timeout: Immediately prior to procedure a time out was called to verify the correct patient, procedure, equipment, support staff and site/side marked as required   Supporting Documentation  Indications: pain   Procedure Details  Location: shoulder - Shoulder joint: Right Bicep Tendon.  Preparation: Patient was prepped and draped in the usual sterile fashion  Needle size: 22 G  Approach: anterior  Medications administered: 5 mL lidocaine PF 1% 1 %; 80 mg methylPREDNISolone acetate 80 MG/ML  Patient tolerance: patient tolerated the procedure well with no immediate complications    Large Joint Arthrocentesis  Date/Time: 2/18/2021 3:59 PM  Consent given by: patient  Site marked: site marked  Timeout: Immediately prior to procedure a time out was called to verify the correct patient, procedure, equipment, support staff and site/side marked as required   Supporting Documentation  Indications: pain   Procedure Details  Location: shoulder - Shoulder joint: Left Bicep Tendon.  Preparation: Patient was prepped and draped in the usual sterile fashion  Needle size: 22 G  Approach: anterior  Medications administered: 5 mL lidocaine PF 1% 1 %; 80 mg methylPREDNISolone acetate 80 MG/ML  Patient tolerance: patient tolerated the procedure well with no immediate complications

## 2021-02-18 NOTE — PROGRESS NOTES
[facility] [place] [facility]opaedic Surgery Office Follow Up       Office Follow Up Visit       Patient Name: Yeni Olivo    Chief Complaint:   Chief Complaint   Patient presents with   • Follow-up     2 month follow up - Rotator cuff syndrome of both shoulders (MEK to DC)        Referring Physician: No ref. provider found    History of Present Illness:   It has been {Numbers; 0-30:05045}  {DAYS, WEEKS, MONTHS, YEARS:82774} since Yeni Olivo's last visit. Yeni Olivo returns to clinic today for {F/U:35638:::1} {Right/left:59710}{Body Part:54860:::1}{Reason:85257:::1}. The issue has been ongoing for {Numbers; 0-30:92937} {DAYS, WEEKS, MONTHS, YEARS:02974}. Yeni Olivo rates {HIS/HER:20200:::1}pain at {0-10:09331}/10 on the pain scale. Previous/current treatments: {Previous Tx:01885}. Current symptoms:{Symptoms:18984:::1}. The pain is worse with {Movement:60709}; {Home Tx:72067} improves the pain. Overall, {he/she:17726:::1}is doing {better worse same:54185}. ***    ***        Subjective   Subjective      Review of Systems   Constitutional: Negative.  Negative for chills, fatigue and fever.   HENT: Negative.  Negative for congestion and dental problem.    Eyes: Negative.  Negative for blurred vision.   Respiratory: Negative.  Negative for shortness of breath.    Cardiovascular: Negative.  Negative for leg swelling.   Gastrointestinal: Negative.  Negative for abdominal pain.   Endocrine: Negative.  Negative for polyuria.   Genitourinary: Negative.  Negative for difficulty urinating.   Musculoskeletal: Positive for arthralgias.   Skin: Negative.    Allergic/Immunologic: Negative.    Neurological: Negative.    Hematological: Negative.  Negative for adenopathy.   Psychiatric/Behavioral: Negative.  Negative for behavioral problems.         [facility] [place], [place] Orthopa[facility] Surgery Clinic Note    Subjective     Chief Complaint   Patient presents with   • Follow-up  "    2 month follow up - Rotator cuff syndrome of both shoulders (MEK to BJM)         HPI    Yeni Olivo is a 60 y.o. female who presents with new problem of ***    I have reviewed the following portions of the patient's history and agree with: {Moose HPI and ROS:39479::\"History of Present Illness\",\"Review of Systems\"}    Patient Active Problem List   Diagnosis   • CTS (carpal tunnel syndrome)   • DDD (degenerative disc disease), lumbar   • HNP (herniated nucleus pulposus), lumbar   • Anxiety and depression   • Tobacco abuse counseling   • Current every day smoker   • Lumbar facet arthropathy   • Lumbar disc disease with radiculopathy   • Lumbar foraminal stenosis     Past Medical History:   Diagnosis Date   • Breast cancer (CMS/[facility])      LEFT    • Drug therapy    • Radiation       Past Surgical History:   Procedure Laterality Date   • BREAST BIOPSY     •  SECTION     • HYSTERECTOMY     • MASTECTOMY      BILATERAL   • OTHER SURGICAL HISTORY  2018    stones removed from submandibular gland       Family History   Problem Relation Age of Onset   • COPD Mother    • Diabetes Mother    • No Known Problems Father    • Multiple sclerosis Sister    • No Known Problems Brother    • No Known Problems Daughter    • No Known Problems Son    • Stroke Maternal Grandmother    • No Known Problems Paternal Grandmother    • No Known Problems Maternal Aunt    • No Known Problems Paternal Aunt      Social History     Socioeconomic History   • Marital status:      Spouse name: Not on file   • Number of children: Not on file   • Years of education: Not on file   • Highest education level: Not on file   Tobacco Use   • Smoking status: Current Every Day Smoker     Packs/day: 1.00     Years: 30.00     Pack years: 30.00     Types: Cigarettes   • Smokeless tobacco: Never Used   Substance and Sexual Activity   • Alcohol use: No   • Drug use: Yes     Types: Marijuana   • Sexual activity: Defer      Current Outpatient " Medications on File Prior to Visit   Medication Sig Dispense Refill   • acetaminophen (TYLENOL) 500 MG tablet Take 500 mg by mouth Every 6 (Six) Hours As Needed for Mild Pain . Pt states she takes 2 in the AM & 1 in the PM     • acyclovir (ZOVIRAX) 400 MG tablet Take 1 tablet by mouth 2 (two) times a day. 180 tablet 1   • ALPRAZolam (XANAX) 0.5 MG tablet Take 1 tablet by mouth 2 (Two) Times a Day As Needed for Anxiety. 60 tablet 0   • Ibuprofen 200 MG capsule Take  by mouth. Pt states she takes 3 in the AM & 2 in the PM     • Multiple Vitamins-Minerals (MULTIVITAMIN ADULT PO) Take  by mouth Daily.     • mupirocin (BACTROBAN) 2 % ointment Apply to surgery site daily with bandage change 22 g 1   • naproxen sodium (ALEVE) 220 MG tablet Take 220 mg by mouth 2 (Two) Times a Day As Needed.     • ofloxacin (FLOXIN) 0.3 % otic solution Administer 10 drops into ear(s) as directed by provider Daily. 5 mL 5   • raNITIdine (ZANTAC) 75 MG tablet Take 150 mg by mouth Daily.     • venlafaxine XR (EFFEXOR-XR) 150 MG 24 hr capsule Take 1 capsule by mouth Daily. 90 capsule 1     No current facility-administered medications on file prior to visit.       Allergies   Allergen Reactions   • Penicillins Anaphylaxis   • Codeine Nausea Only   • Reglan [Metoclopramide] Unknown (See Comments)     neurological   • Talwin [Pentazocine] Other (See Comments)     Oral swelling.         Review of Systems   Constitutional: Negative.  Negative for chills, fatigue and fever.   HENT: Negative.  Negative for congestion and dental problem.    Eyes: Negative.  Negative for blurred vision.   Respiratory: Negative.  Negative for shortness of breath.    Cardiovascular: Negative.  Negative for leg swelling.   Gastrointestinal: Negative.  Negative for abdominal pain.   Endocrine: Negative.  Negative for polyuria.   Genitourinary: Negative.  Negative for difficulty urinating.   Musculoskeletal: Positive for arthralgias.   Skin: Negative.     "  Allergic/Immunologic: Negative.    Neurological: Negative.    Hematological: Negative.  Negative for adenopathy.   Psychiatric/Behavioral: Negative.  Negative for behavioral problems.        Objective      Physical Exam  Ht 160 cm (62.99\")   Wt 56.7 kg (125 lb)   BMI 22.15 kg/m²     Body mass index is 22.15 kg/m².    General:   Mental Status:  Alert   Appearance: Cooperative, in no acute distress   Build and Nutrition: *** female   Orientation: Alert and oriented to person, place and time   Posture: Normal   Gait: ***    ***    Imaging/Studies      Imaging Results (Last 24 Hours)     ** No results found for the last 24 hours. **          Assessment and Plan     There are no diagnoses linked to this encounter.    No diagnosis found.    ***    No follow-ups on file.    Medical Decision Making  Management Options : {[facility] 21:60211}    ***         Past Medical History:   Past Medical History:   Diagnosis Date   • Breast cancer (CMS/HCC)     2006 LEFT    • Drug therapy    • Radiation        Past Surgical History:   Past Surgical History:   Procedure Laterality Date   • BREAST BIOPSY     •  SECTION     • HYSTERECTOMY     • MASTECTOMY      BILATERAL   • OTHER SURGICAL HISTORY  2018    stones removed from submandibular gland        Family History:   Family History   Problem Relation Age of Onset   • COPD Mother    • Diabetes Mother    • No Known Problems Father    • Multiple sclerosis Sister    • No Known Problems Brother    • No Known Problems Daughter    • No Known Problems Son    • Stroke Maternal Grandmother    • No Known Problems Paternal Grandmother    • No Known Problems Maternal Aunt    • No Known Problems Paternal Aunt        Social History:   Social History     Socioeconomic History   • Marital status:      Spouse name: Not on file   • Number of children: Not on file   • Years of education: Not on file   • Highest education level: Not on file   Tobacco Use   • Smoking status: Current Every Day " Smoker     Packs/day: 1.00     Years: 30.00     Pack years: 30.00     Types: Cigarettes   • Smokeless tobacco: Never Used   Substance and Sexual Activity   • Alcohol use: No   • Drug use: Yes     Types: Marijuana   • Sexual activity: Defer       Medications:   Current Outpatient Medications:   •  acetaminophen (TYLENOL) 500 MG tablet, Take 500 mg by mouth Every 6 (Six) Hours As Needed for Mild Pain . Pt states she takes 2 in the AM & 1 in the PM, Disp: , Rfl:   •  acyclovir (ZOVIRAX) 400 MG tablet, Take 1 tablet by mouth 2 (two) times a day., Disp: 180 tablet, Rfl: 1  •  ALPRAZolam (XANAX) 0.5 MG tablet, Take 1 tablet by mouth 2 (Two) Times a Day As Needed for Anxiety., Disp: 60 tablet, Rfl: 0  •  Ibuprofen 200 MG capsule, Take  by mouth. Pt states she takes 3 in the AM & 2 in the PM, Disp: , Rfl:   •  Multiple Vitamins-Minerals (MULTIVITAMIN ADULT PO), Take  by mouth Daily., Disp: , Rfl:   •  mupirocin (BACTROBAN) 2 % ointment, Apply to surgery site daily with bandage change, Disp: 22 g, Rfl: 1  •  naproxen sodium (ALEVE) 220 MG tablet, Take 220 mg by mouth 2 (Two) Times a Day As Needed., Disp: , Rfl:   •  ofloxacin (FLOXIN) 0.3 % otic solution, Administer 10 drops into ear(s) as directed by provider Daily., Disp: 5 mL, Rfl: 5  •  raNITIdine (ZANTAC) 75 MG tablet, Take 150 mg by mouth Daily., Disp: , Rfl:   •  venlafaxine XR (EFFEXOR-XR) 150 MG 24 hr capsule, Take 1 capsule by mouth Daily., Disp: 90 capsule, Rfl: 1    Allergies:   Allergies   Allergen Reactions   • Penicillins Anaphylaxis   • Codeine Nausea Only   • Reglan [Metoclopramide] Unknown (See Comments)     neurological   • Talwin [Pentazocine] Other (See Comments)     Oral swelling.        The following portions of the patient's history were reviewed and updated as appropriate: allergies, current medications, past family history, past medical history, past social history, past surgical history and problem list.        Objective    Objective      Vital  "Signs:   Vitals:    02/18/21 1532   Weight: 56.7 kg (125 lb)   Height: 160 cm (62.99\")       Ortho Exam:  ***    Results Review:  Imaging Results (Last 24 Hours)     ** No results found for the last 24 hours. **          ***    Procedures    ***        Assessment / Plan      Assessment/Plan:   Problem List Items Addressed This Visit     None          ***    Follow Up:   No follow-ups on file.      Benedict Price MD, FAAOS  Orthopedic Surgeon  Fellowship Trained Shoulder and Elbow Surgeon    Orthopedics and Sports Medicine  [address] [place] [address], [address]  [place] [address]    02/18/21  15:32 EST    Please note that portions of this note may have been completed with a voice recognition program. Efforts were made to edit the dictations, but occasionally words are mistranscribed.   "

## 2021-02-18 NOTE — PROGRESS NOTES
[facility] [place] Orthopaedic Surgery Office Visit - Benedict Price MD    Office Visit       Patient Name: Yeni Olivo    Chief Complaint:   Chief Complaint   Patient presents with   • Follow-up     2 month follow up - Rotator cuff syndrome of both shoulders (MEK to BJM)        Referring Physician: Dr. Reginaldo Virk MD  PCP HARVINDER Manzo    History of Present Illness:   Yeni Olivo is a 60 y.o. female who presents with bilateral body part: shoulder Reason: pain.  Onset:Onset: atraumatic and gradual in nature. The issue has been ongoing for 10 year(s). Pain is a 3-8/10 on the pain scale. Pain is described as Pain Characterization: aching. Associated symptoms include Symptoms: pain, popping and stiffness. The pain is worse with sleeping, working and leisure; injections improve the pain. Previous treatments have included: physical therapy and steroid injection (last injection 12/09/2020). I have reviewed the patient's history of present illness as noted/entered above.    I have reviewed the patient's past medical history, surgical history, social history, family history, medications, and allergies as noted in the electronic medical record and as noted/entered.  I have reviewed the patient's review of systems as noted/enter and updated as noted in the patient's HPI.    LEFT >>>> right shoulder pain    I appreciate the referral from Dr. Virk  Bilateral shoulder pain with bilateral MRIs in our system  Left worse than right shoulder pain    Bilateral subacromial injections completed by Dr. 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  Appears of primary pain generator on exam is biceps tendon bilaterally and does desire proceed with biceps injections today.      Subjective   Subjective      Review  of Systems   Constitutional: Negative.  Negative for chills, fatigue and fever.   HENT: Negative.  Negative for congestion and dental problem.    Eyes: Negative.  Negative for blurred vision.   Respiratory: Negative.  Negative for shortness of breath.    Cardiovascular: Negative.  Negative for leg swelling.   Gastrointestinal: Negative.  Negative for abdominal pain.   Endocrine: Negative.  Negative for polyuria.   Genitourinary: Negative.  Negative for difficulty urinating.   Musculoskeletal: Positive for arthralgias.   Skin: Negative.    Allergic/Immunologic: Negative.    Neurological: Negative.    Hematological: Negative.  Negative for adenopathy.   Psychiatric/Behavioral: Negative.  Negative for behavioral problems.        Past Medical History:   Past Medical History:   Diagnosis Date   • Breast cancer (CMS/[facility])     2006 LEFT    • Drug therapy    • Radiation        Past Surgical History:   Past Surgical History:   Procedure Laterality Date   • BREAST BIOPSY     •  SECTION     • HYSTERECTOMY     • MASTECTOMY      BILATERAL   • OTHER SURGICAL HISTORY  2018    stones removed from submandibular gland        Family History:   Family History   Problem Relation Age of Onset   • COPD Mother    • Diabetes Mother    • No Known Problems Father    • Multiple sclerosis Sister    • No Known Problems Brother    • No Known Problems Daughter    • No Known Problems Son    • Stroke Maternal Grandmother    • No Known Problems Paternal Grandmother    • No Known Problems Maternal Aunt    • No Known Problems Paternal Aunt        Social History:   Social History     Socioeconomic History   • Marital status:      Spouse name: Not on file   • Number of children: Not on file   • Years of education: Not on file   • Highest education level: Not on file   Tobacco Use   • Smoking status: Current Every Day Smoker     Packs/day: 1.00     Years: 30.00     Pack years: 30.00     Types: Cigarettes   • Smokeless tobacco: Never Used   "  Substance and Sexual Activity   • Alcohol use: No   • Drug use: Yes     Types: Marijuana   • Sexual activity: Defer       Medications:   Current Outpatient Medications:   •  acetaminophen (TYLENOL) 500 MG tablet, Take 500 mg by mouth Every 6 (Six) Hours As Needed for Mild Pain . Pt states she takes 2 in the AM & 1 in the PM, Disp: , Rfl:   •  acyclovir (ZOVIRAX) 400 MG tablet, Take 1 tablet by mouth 2 (two) times a day., Disp: 180 tablet, Rfl: 1  •  ALPRAZolam (XANAX) 0.5 MG tablet, Take 1 tablet by mouth 2 (Two) Times a Day As Needed for Anxiety., Disp: 60 tablet, Rfl: 0  •  Ibuprofen 200 MG capsule, Take  by mouth. Pt states she takes 3 in the AM & 2 in the PM, Disp: , Rfl:   •  venlafaxine XR (EFFEXOR-XR) 150 MG 24 hr capsule, Take 1 capsule by mouth Daily., Disp: 90 capsule, Rfl: 1    Allergies:   Allergies   Allergen Reactions   • Penicillins Anaphylaxis   • Codeine Nausea Only   • Reglan [Metoclopramide] Unknown (See Comments)     neurological   • Talwin [Pentazocine] Other (See Comments)     Oral swelling.        The following portions of the patient's history were reviewed and updated as appropriate: allergies, current medications, past family history, past medical history, past social history, past surgical history and problem list.        Objective    Objective      Vital Signs:   Vitals:    02/18/21 1532   Pulse: 80   SpO2: 98%   Weight: 56.7 kg (125 lb)   Height: 160 cm (62.99\")       Ortho Exam:  General: no acute distress, comfortable  Vitals reviewed in chart  Head: normocephalic, atraumatic  Ears: no external drainage noted  Eyes: extraocular muscles appear intact with good tracking  Psych: alert and oriented, normal appearing mood  Vascular: 2+ pulses symmetric, well perfused  Neurologic:  Sensation to light touch intact distally  Spine/Cervical exam: motion preserved  Dermatologic: skin not hot/red/swollen  Respiratory: breathing comfortably on room air, no intercostal " retractions  Cardiovascular: regular rate and rhythm, well perfused    Musculoskeletal Exam    SIDE: Left worse than right  Shoulder Exam:  Tenderness: Anterior biceps tendon pain    Range of motion measurements (degrees)  Forward flexion/Abduction/External rotation at side/ER at 90/IR at 90/IR position  Active: 160/160/60/80/80  Passive: same    Painful arc of motion: yes  No evidence of septic joint  Pain with forward flexion and abduction greater: 90 degrees  Impingement testing Neer's test - positive/painful  Impingement testing Hawkin's test - positive/painful    Rotator Cuff Testing:  Tenderness to palpation at rotator cuff - no  Rotator cuff testing Mane's test - mild pain, good strength  Rotator cuff testing External rotation - no  Rotator cuff testing Lag signs - no  Rotator cuff testing Belly press - no  Pain with abduction great than 90 degrees - yes    Scapular dyskinesis - present, abnormal scapular motion    Biceps testing (biceps tenderness to palpation, Positive Porter's test for biceps, Positive Speed's test, Positive uppercut test): Painful left worse than right    Good rotator cuff strength noted bilaterally    Results Review:   Imaging Results (Last 24 Hours)     ** No results found for the last 24 hours. **        I reviewed her bilateral shoulder MRIs from 7/20/2020.  The left is most notable with regards to pain has primarily tendinopathy may be some mild partial rotator cuff tearing and some degenerative labral fraying.  Similar findings on the right perhaps mild partial tearing of the rotator cuff supraspinatus and degenerative labral tearing, SLAP tear.    Procedures     LEFT SHOULDER BICEPS TENDON SHEATH INJECTION: Risks and benefits of a shoulder biceps tendon sheath injection were discussed and the patient desired to proceed. Verbal consent was obtained. The patient understood the risk of infection, potential skin changes, bump in blood glucose especially with diabetes, nerve injury,  possibility of increased pain in the short term, and possible incomplete pain relief. Using sterile technique, the shoulder biceps tendon sheath was injected from an anterior approach with 80mg/mL of Depo Medrol and 4cc of lidocaine with aspiration prior to injection. The patient tolerated the procedure without difficulty. CPT CODE 67835 for tendon sheath injection    RIGHT SHOULDER BICEPS TENDON SHEATH INJECTION: Risks and benefits of a shoulder biceps tendon sheath injection were discussed and the patient desired to proceed. Verbal consent was obtained. The patient understood the risk of infection, potential skin changes, bump in blood glucose especially with diabetes, nerve injury, possibility of increased pain in the short term, and possible incomplete pain relief. Using sterile technique, the shoulder biceps tendon sheath was injected from an anterior approach with 80mg/mL of Depo Medrol and 4cc of lidocaine with aspiration prior to injection. The patient tolerated the procedure without difficulty. CPT CODE 51094 for tendon sheath injection          Assessment / Plan      Assessment/Plan:   Problem List Items Addressed This Visit        Musculoskeletal and Injuries    Bursitis of left shoulder    Impingement syndrome of left shoulder    Biceps tendinitis of left upper extremity - Primary    Nontraumatic incomplete tear of right rotator cuff    Bursitis of right shoulder    Biceps tendinitis of right upper extremity        She desired biceps tendon sheath injections today, home exercise program was provided.  Counseled on conservative measures.  Counseled on repeat MRI primarily the left if no better over the next 6 to 8 weeks following injection.  I will see her back in 3 months to assess her response to additional conservative measures.  Counseled did not appreciate a full-thickness tear on either MRI and that conservative measures recommended.  She will update me sooner if minimal improvements and we will  proceed with the updated MRI.    Follow Up:   3 months    Benedict Price MD, FAAOS  Orthopedic Surgeon  Fellowship Trained Shoulder and Elbow Surgeon  [facility]  Orthopedics and Sports Medicine  [address] [place] [address], [address]  [place] [address]    02/18/21  15:56 EST    Please note that portions of this note may have been completed with a voice recognition program. Efforts were made to edit the dictations, but occasionally words are mistranscribed.

## 2021-02-19 ENCOUNTER — OFFICE VISIT (OUTPATIENT)
Dept: CARDIAC SURGERY | Facility: CLINIC | Age: 61
End: 2021-02-19

## 2021-02-19 ENCOUNTER — HOSPITAL ENCOUNTER (OUTPATIENT)
Dept: CT IMAGING | Facility: HOSPITAL | Age: 61
Discharge: HOME | End: 2021-02-19
Admitting: PHYSICIAN ASSISTANT

## 2021-02-19 ENCOUNTER — TELEPHONE (OUTPATIENT)
Dept: FAMILY MEDICINE CLINIC | Facility: CLINIC | Age: 61
End: 2021-02-19

## 2021-02-19 VITALS
TEMPERATURE: 97.8 F | OXYGEN SATURATION: 98 % | HEART RATE: 100 BPM | BODY MASS INDEX: 22.32 KG/M2 | HEIGHT: 63 IN | DIASTOLIC BLOOD PRESSURE: 66 MMHG | WEIGHT: 126 LBS | SYSTOLIC BLOOD PRESSURE: 156 MMHG

## 2021-02-19 DIAGNOSIS — C50.412 MALIGNANT NEOPLASM OF UPPER-OUTER QUADRANT OF LEFT BREAST IN FEMALE, ESTROGEN RECEPTOR POSITIVE (HCC): ICD-10-CM

## 2021-02-19 DIAGNOSIS — Z71.6 TOBACCO ABUSE COUNSELING: ICD-10-CM

## 2021-02-19 DIAGNOSIS — F17.200 CURRENT EVERY DAY SMOKER: ICD-10-CM

## 2021-02-19 DIAGNOSIS — R91.1 LUNG NODULE: Primary | ICD-10-CM

## 2021-02-19 DIAGNOSIS — Z17.0 MALIGNANT NEOPLASM OF UPPER-OUTER QUADRANT OF LEFT BREAST IN FEMALE, ESTROGEN RECEPTOR POSITIVE (HCC): ICD-10-CM

## 2021-02-19 DIAGNOSIS — F17.210 CIGARETTE SMOKER: ICD-10-CM

## 2021-02-19 DIAGNOSIS — R91.1 NODULE OF UPPER LOBE OF LEFT LUNG: Primary | ICD-10-CM

## 2021-02-19 PROCEDURE — 71271 CT THORAX LUNG CANCER SCR C-: CPT

## 2021-02-19 PROCEDURE — 99203 OFFICE O/P NEW LOW 30 MIN: CPT | Performed by: THORACIC SURGERY (CARDIOTHORACIC VASCULAR SURGERY)

## 2021-02-19 NOTE — PROGRESS NOTES
02/19/2021  Patient Information  Yeni Codyepke                                                                                          [id number]  [facility] [address]   1960  'PCP/Referring Physician'  Delmis Goyal PA-C  [phone]  No ref. provider found    Chief Complaint   Patient presents with   • Consult     Np referred for a left upper lobe lung nodule,complains of a cough but says that is a usual smokers cough.   • Lung Nodule       History of Present Illness: Patient is a 60-year-old female who was referred to me by Dr. Frederick Jorge for further evaluation of a left upper lobe lung mass 1.9 cm discovered on a CT screening test of the lung secondary to a 40-year pack history of smoking.  In addition the patient's had a poorly differentiated infiltrating ductal carcinoma of the left breast diagnosed in 2005 at which time she had preop chemotherapy per Dr. Gian Carroll of oncology followed by bilateral simple mastectomies in January 2006.  Patient had postop x-ray therapy of 33 treatments per Dr. Franchesca Hawley of radiation oncology.  During that postop x-ray treatment the patient was found to have a left axillary mass which was positive for tumor in 1 of 3 lymph nodes.  She has been followed postoperative for possible recurrence by her breast surgeon Dr. DMITRIY ROCHE.  Patient is a ongoing pack-a-day smoker.      Patient Active Problem List   Diagnosis   • CTS (carpal tunnel syndrome)   • DDD (degenerative disc disease), lumbar   • HNP (herniated nucleus pulposus), lumbar   • Anxiety and depression   • Tobacco abuse counseling   • Current every day smoker   • Lumbar facet arthropathy   • Lumbar disc disease with radiculopathy   • Lumbar foraminal stenosis   • Bursitis of left shoulder   • Impingement syndrome of left shoulder   • Biceps tendinitis of left upper extremity   • Nontraumatic incomplete tear of right rotator cuff   • Bursitis of right shoulder   • Biceps tendinitis of right  upper extremity     Past Medical History:   Diagnosis Date   • Arthritis    • Breast cancer (CMS/HCC)     2006 LEFT    • Drug therapy    • Radiation      Past Surgical History:   Procedure Laterality Date   • BREAST BIOPSY     •  SECTION     • HYSTERECTOMY     • MASTECTOMY      BILATERAL   • OTHER SURGICAL HISTORY  2018    stones removed from submandibular gland        Current Outpatient Medications:   •  acetaminophen (TYLENOL) 500 MG tablet, Take 500 mg by mouth Every 6 (Six) Hours As Needed for Mild Pain . Pt states she takes 2 in the AM & 1 in the PM, Disp: , Rfl:   •  acyclovir (ZOVIRAX) 400 MG tablet, Take 1 tablet by mouth 2 (two) times a day., Disp: 180 tablet, Rfl: 1  •  ALPRAZolam (XANAX) 0.5 MG tablet, Take 1 tablet by mouth 2 (Two) Times a Day As Needed for Anxiety., Disp: 60 tablet, Rfl: 0  •  Ibuprofen 200 MG capsule, Take  by mouth. Pt states she takes 3 in the AM & 2 in the PM, Disp: , Rfl:   •  venlafaxine XR (EFFEXOR-XR) 150 MG 24 hr capsule, Take 1 capsule by mouth Daily., Disp: 90 capsule, Rfl: 1  No current facility-administered medications for this visit.   Allergies   Allergen Reactions   • Penicillins Anaphylaxis   • Codeine Nausea Only   • Reglan [Metoclopramide] Unknown (See Comments)     neurological   • Talwin [Pentazocine] Other (See Comments)     Oral swelling.      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   Tobacco Use   • Smoking status: Current Every Day Smoker     Packs/day: 1.00     Years: 40.00     Pack years: 40.00     Types: Cigarettes   • Smokeless tobacco: Never Used   Substance and Sexual Activity   • Alcohol use: No   • Drug use: Yes     Types: Marijuana   • Sexual activity: Defer   Social History Narrative    Lives in [facility]     Family History  "  Problem Relation Age of Onset   • COPD Mother    • Diabetes Mother    • No Known Problems Father    • Multiple sclerosis Sister    • No Known Problems Brother    • No Known Problems Daughter    • No Known Problems Son    • Stroke Maternal Grandmother    • No Known Problems Paternal Grandmother    • No Known Problems Maternal Aunt    • No Known Problems Paternal Aunt      Review of Systems   Constitution: Negative for chills, fever, malaise/fatigue, night sweats and weight loss.   HENT: Negative for hearing loss, odynophagia and sore throat.    Cardiovascular: Negative for chest pain, dyspnea on exertion, leg swelling, orthopnea and palpitations.   Respiratory: Positive for cough. Negative for hemoptysis.    Endocrine: Negative for cold intolerance, heat intolerance, polydipsia, polyphagia and polyuria.   Hematologic/Lymphatic: Bruises/bleeds easily.   Skin: Negative for itching and rash.   Musculoskeletal: Positive for arthritis and joint pain. Negative for joint swelling and myalgias.   Gastrointestinal: Negative for abdominal pain, constipation, diarrhea, hematemesis, hematochezia, melena, nausea and vomiting.   Genitourinary: Negative for dysuria, frequency and hematuria.   Neurological: Negative for focal weakness, headaches, numbness and seizures.   Psychiatric/Behavioral: Positive for depression. Negative for suicidal ideas. The patient is nervous/anxious.    All other systems reviewed and are negative.    Vitals:    02/19/21 1335   BP: 156/66   BP Location: Right arm   Patient Position: Sitting   Pulse: 100   Temp: 97.8 °F (36.6 °C)   SpO2: 98%   Weight: 57.2 kg (126 lb)   Height: 160 cm (63\")      Physical Exam  Constitutional:       Appearance: Normal appearance.   HENT:      Head: Normocephalic.   Eyes:      Extraocular Movements: Extraocular movements intact.      Pupils: Pupils are equal, round, and reactive to light.   Neck:      Musculoskeletal: No neck rigidity or muscular tenderness. "   Cardiovascular:      Rate and Rhythm: Normal rate and regular rhythm.      Pulses: Normal pulses.   Pulmonary:      Effort: Pulmonary effort is normal.      Breath sounds: Normal breath sounds.   Abdominal:      General: Abdomen is flat. Bowel sounds are normal.      Palpations: Abdomen is soft.   Musculoskeletal:         General: No swelling, tenderness, deformity or signs of injury.      Right lower leg: No edema.      Left lower leg: No edema.   Skin:     General: Skin is warm and dry.      Capillary Refill: Capillary refill takes less than 2 seconds.   Neurological:      General: No focal deficit present.      Mental Status: She is alert and oriented to person, place, and time.   Psychiatric:         Mood and Affect: Mood normal.         Behavior: Behavior normal.         Thought Content: Thought content normal.         The ROS, past medical history, surgical history, family history, social history and vitals were reviewed by myself and corrected as needed.      Labs/Imaging: The recent CT scan of the chest was reviewed by myself and my partner Dr. Carnes appears to be a pleural-based mass in the left upper lobe may be abutting on the rib cage which has a spiculated appearance suggesting a primary lung carcinoma.    Assessment/Plan: #1.  Newly diagnosed left upper lobe lung mass appears to be pleural based and possibly involving the chest wall which an ongoing smoker of a 40-pack-year history of smoking suggest possible primary lung carcinoma.  2.  Poorly infiltrating ductal carcinoma of the left breast diagnosed in 2005 with a bilateral simple mastectomy in January 2006.  Postoperatively patient was found to have an axillary mass and was diagnosed with metastatic infiltrating ductal carcinoma in 1 of 3 lymph nodes in March 2006.    Plan: We are going to have the patient have a CT directed needle biopsy of this left upper lobe lung mass per radiology here at [facility] by Dr. Abbott.  We  will see her back in follow-up in the office once this needle biopsy has been obtained.  If it is carcinoma we will plan to proceed ahead with a PET scan as well as present her at tumor board thoracic conference in the very near future.  I discussed this with Dr. Frederick Jorge, her brother, and he understands our plan.    Patient Active Problem List   Diagnosis   • CTS (carpal tunnel syndrome)   • DDD (degenerative disc disease), lumbar   • HNP (herniated nucleus pulposus), lumbar   • Anxiety and depression   • Tobacco abuse counseling   • Current every day smoker   • Lumbar facet arthropathy   • Lumbar disc disease with radiculopathy   • Lumbar foraminal stenosis   • Bursitis of left shoulder   • Impingement syndrome of left shoulder   • Biceps tendinitis of left upper extremity   • Nontraumatic incomplete tear of right rotator cuff   • Bursitis of right shoulder   • Biceps tendinitis of right upper extremity

## 2021-02-22 ENCOUNTER — APPOINTMENT (OUTPATIENT)
Dept: PREADMISSION TESTING | Facility: HOSPITAL | Age: 61
End: 2021-02-22

## 2021-02-22 PROCEDURE — C9803 HOPD COVID-19 SPEC COLLECT: HCPCS

## 2021-02-22 PROCEDURE — U0004 COV-19 TEST NON-CDC HGH THRU: HCPCS

## 2021-02-23 LAB — SARS-COV-2 RNA RESP QL NAA+PROBE: NOT DETECTED

## 2021-02-24 DIAGNOSIS — Z85.3 H/O MALIGNANT NEOPLASM OF BREAST: ICD-10-CM

## 2021-02-24 DIAGNOSIS — R91.1 LUNG NODULE: Primary | ICD-10-CM

## 2021-02-25 ENCOUNTER — HOSPITAL ENCOUNTER (OUTPATIENT)
Dept: CT IMAGING | Facility: HOSPITAL | Age: 61
End: 2021-02-25

## 2021-03-01 ENCOUNTER — TELEPHONE (OUTPATIENT)
Dept: FAMILY MEDICINE CLINIC | Facility: CLINIC | Age: 61
End: 2021-03-01

## 2021-03-01 NOTE — TELEPHONE ENCOUNTER
----- Message from Delmis Goyal PA-C sent at 2/28/2021  4:49 PM EST -----  Regarding: call patient

## 2021-03-03 ENCOUNTER — TELEPHONE (OUTPATIENT)
Dept: CARDIAC SURGERY | Facility: CLINIC | Age: 61
End: 2021-03-03

## 2021-03-03 NOTE — TELEPHONE ENCOUNTER
I have been working on getting an auth for a PET scan for yohana for a lung Nodule that was discovered on a recent CT Chest. I had submitted the claim through her [employer] insurance portal on 02/25. When I tried to check on the auth on 03/01 through the portal the web site was down, so I tried again on 03/02. The response I received was that her insurance had termed and was eligible for an order ID. I called [employer] and went through the Auto Service for Eligibility-it stated insurance had termed on 02/28/2021. [facility] then [facility]-which is their authorization service-regarding my claim I had submitted on 02/25-they could not look up any information on Yohana since her insurance is not active. I then called [employer] Directly and s/w Reginaldo (Call Ref #- [id number]), Reginaldo stated that since her insurance had termed on 02/28/2021 anything that was submitted and not completed before 02/28 was canceled since her insurance would no longer be active after that date.       I called Yohana this morning and relayed this information to her. She stated that [facility] was supposed to reactive her insurance and it was to be effective starting today (03/03/2021). I explained that according to [employer] it is still inactive and It could possibly take a few days for [place] to receive the information to reactive her insurance. Yohana stated she would be contacting her HR regarding this. I can not schedule her PET until I have an Auth through her insurance. I will also relay this information to Dr. Dasilva.

## 2021-03-18 ENCOUNTER — LAB (OUTPATIENT)
Dept: LAB | Facility: HOSPITAL | Age: 61
End: 2021-03-18

## 2021-03-18 ENCOUNTER — HOSPITAL ENCOUNTER (OUTPATIENT)
Dept: PET IMAGING | Facility: HOSPITAL | Age: 61
Discharge: HOME | End: 2021-03-18

## 2021-03-18 DIAGNOSIS — Z13.29 SCREENING FOR THYROID DISORDER: ICD-10-CM

## 2021-03-18 DIAGNOSIS — Z13.1 SCREENING FOR DIABETES MELLITUS: ICD-10-CM

## 2021-03-18 DIAGNOSIS — E55.9 VITAMIN D DEFICIENCY: ICD-10-CM

## 2021-03-18 DIAGNOSIS — Z13.220 SCREENING FOR CHOLESTEROL LEVEL: ICD-10-CM

## 2021-03-18 DIAGNOSIS — Z13.0 SCREENING FOR DEFICIENCY ANEMIA: ICD-10-CM

## 2021-03-18 LAB
25(OH)D3 SERPL-MCNC: 24.5 NG/ML
ALBUMIN SERPL-MCNC: 4.7 G/DL (ref 3.5–5.2)
ALBUMIN/GLOB SERPL: 2.6 G/DL
ALP SERPL-CCNC: 96 U/L (ref 39–117)
ALT SERPL W P-5'-P-CCNC: 20 U/L (ref 1–33)
ANION GAP SERPL CALCULATED.3IONS-SCNC: 8.4 MMOL/L (ref 5–15)
AST SERPL-CCNC: 21 U/L (ref 1–32)
BASOPHILS # BLD AUTO: 0.03 10*3/MM3 (ref 0–0.2)
BASOPHILS NFR BLD AUTO: 0.5 % (ref 0–1.5)
BILIRUB SERPL-MCNC: 0.4 MG/DL (ref 0–1.2)
BUN SERPL-MCNC: 18 MG/DL (ref 8–23)
BUN/CREAT SERPL: 25.4 (ref 7–25)
CALCIUM SPEC-SCNC: 9.6 MG/DL (ref 8.6–10.5)
CHLORIDE SERPL-SCNC: 104 MMOL/L (ref 98–107)
CHOLEST SERPL-MCNC: 202 MG/DL (ref 0–200)
CO2 SERPL-SCNC: 28.6 MMOL/L (ref 22–29)
CREAT SERPL-MCNC: 0.71 MG/DL (ref 0.57–1)
DEPRECATED RDW RBC AUTO: 42.6 FL (ref 37–54)
EOSINOPHIL # BLD AUTO: 0.1 10*3/MM3 (ref 0–0.4)
EOSINOPHIL NFR BLD AUTO: 1.8 % (ref 0.3–6.2)
ERYTHROCYTE [DISTWIDTH] IN BLOOD BY AUTOMATED COUNT: 11.5 % (ref 12.3–15.4)
GFR SERPL CREATININE-BSD FRML MDRD: 84 ML/MIN/1.73
GLOBULIN UR ELPH-MCNC: 1.8 GM/DL
GLUCOSE BLDC GLUCOMTR-MCNC: 99 MG/DL (ref 70–130)
GLUCOSE SERPL-MCNC: 87 MG/DL (ref 65–99)
HCT VFR BLD AUTO: 41 % (ref 34–46.6)
HDLC SERPL-MCNC: 95 MG/DL (ref 40–60)
HGB BLD-MCNC: 14.2 G/DL (ref 12–15.9)
IMM GRANULOCYTES # BLD AUTO: 0.01 10*3/MM3 (ref 0–0.05)
IMM GRANULOCYTES NFR BLD AUTO: 0.2 % (ref 0–0.5)
LDLC SERPL CALC-MCNC: 96 MG/DL (ref 0–100)
LDLC/HDLC SERPL: 1 {RATIO}
LYMPHOCYTES # BLD AUTO: 1.39 10*3/MM3 (ref 0.7–3.1)
LYMPHOCYTES NFR BLD AUTO: 25.1 % (ref 19.6–45.3)
MCH RBC QN AUTO: 35.3 PG (ref 26.6–33)
MCHC RBC AUTO-ENTMCNC: 34.6 G/DL (ref 31.5–35.7)
MCV RBC AUTO: 102 FL (ref 79–97)
MONOCYTES # BLD AUTO: 0.39 10*3/MM3 (ref 0.1–0.9)
MONOCYTES NFR BLD AUTO: 7 % (ref 5–12)
NEUTROPHILS NFR BLD AUTO: 3.62 10*3/MM3 (ref 1.7–7)
NEUTROPHILS NFR BLD AUTO: 65.4 % (ref 42.7–76)
NRBC BLD AUTO-RTO: 0 /100 WBC (ref 0–0.2)
PLATELET # BLD AUTO: 249 10*3/MM3 (ref 140–450)
PMV BLD AUTO: 8.7 FL (ref 6–12)
POTASSIUM SERPL-SCNC: 5.5 MMOL/L (ref 3.5–5.2)
PROT SERPL-MCNC: 6.5 G/DL (ref 6–8.5)
RBC # BLD AUTO: 4.02 10*6/MM3 (ref 3.77–5.28)
SODIUM SERPL-SCNC: 141 MMOL/L (ref 136–145)
TRIGL SERPL-MCNC: 60 MG/DL (ref 0–150)
TSH SERPL DL<=0.05 MIU/L-ACNC: 2 UIU/ML (ref 0.27–4.2)
VLDLC SERPL-MCNC: 11 MG/DL (ref 5–40)
WBC # BLD AUTO: 5.54 10*3/MM3 (ref 3.4–10.8)

## 2021-03-18 PROCEDURE — 85025 COMPLETE CBC W/AUTO DIFF WBC: CPT

## 2021-03-18 PROCEDURE — 82306 VITAMIN D 25 HYDROXY: CPT

## 2021-03-18 PROCEDURE — 0 FLUDEOXYGLUCOSE F18 SOLUTION: Performed by: NURSE PRACTITIONER

## 2021-03-18 PROCEDURE — A9552 F18 FDG: HCPCS | Performed by: NURSE PRACTITIONER

## 2021-03-18 PROCEDURE — 80061 LIPID PANEL: CPT

## 2021-03-18 PROCEDURE — 80053 COMPREHEN METABOLIC PANEL: CPT

## 2021-03-18 PROCEDURE — 82962 GLUCOSE BLOOD TEST: CPT

## 2021-03-18 PROCEDURE — 78815 PET IMAGE W/CT SKULL-THIGH: CPT

## 2021-03-18 PROCEDURE — 84443 ASSAY THYROID STIM HORMONE: CPT

## 2021-03-18 RX ADMIN — FLUDEOXYGLUCOSE F18 1 DOSE: 300 INJECTION INTRAVENOUS at 08:04

## 2021-03-22 ENCOUNTER — MDT ASSESSMENT (OUTPATIENT)
Dept: ONCOLOGY | Facility: CLINIC | Age: 61
End: 2021-03-22

## 2021-03-22 NOTE — PROGRESS NOTES
CANCER CONFERENCE AGENDA  DATE:   2021      SPECIALTY:  Thoracic  PRESENTER:   Jose Francisco Dasilva M.D.  SITE:    Lung nodule       HPI:  60 YOWF who presented with an abnormal screening CT chest.       REFERRING PROVIDER:  Frederick Jorge M.D. (Retired - [employer]).  Dr. Jorge is the patient’s brother.         PLACE OF RESIDENCE:  [place], [place]       SOCIAL HISTORY:  Current every day smoker 1 PPD x40 years.  She is a  (two children) and employed ( [employer]).  Patient is retiring from [employer] and will work from home ([employer] and/or [employer]).       FAMILY HISTORY:  Non-contributory       PAST MEDICAL HISTORY:  Poorly-differentiated ductal carcinoma of left breast (2005) s/p niharika-adjuvant chemotherapy (Dr. Gian Berry) followed by bilateral mastectomy (2006 - Dr. Ricci Waldrop) and adjuvant radiotherapy x33 treatments (Dr. Hawley).       Insomnia, depression, anxiety (daily Xanax pm to help sleep), lumbar foraminal stenosis, left shoulder bursitis, left shoulder impingement syndrome.       PAST SURGICAL HISTORY:  FNA axillary lymph nodes (2006), FNA left axillary lymph node (2006), left breast simple mastectomy (2006 - Dr. Waldrop), left breast 2:00 & 12:30 o’clock stereotactic biopsy (2005), colonoscopy w/polypectomy x2 (), FNA right axillary lymph node (), right mandibular gland stone biopsy ().   PFTs:  Not available     NEXT GEN SEQUENCING: Not available     IMAGIN/19/2021 ([employer] [facility] [employer] [facility] [employer] [facility]):  Low-dose CT chest - There is minimal scarring identified anteriorly within the MIGUEL. This area has slightly worsened when compared to the prior examination. The nodular focus today measures ~1.9 cm. Six-month follow-up is recommended for stability of this area.        [facility]  [facility]/CT - In the chest, patient's left apical disease appears much less prominent, practically linear, and is non-hypermetabolic. The residual subpleural area in the left apex has a  maximal SUV of only 1.3. Accordingly, findings may represent residual from previous focal inflammation, now resolved.     CLINICAL STAGE:    stage N/A     SURGICAL PROCEDURE: Not available     PATHOLOGY: :  02/25/2021 CT-guided biopsy of lung was not performed; radiologist (Dr. Brandon) felt the nodule was too small for biopsy.       TREATMENT PLAN DISCUSSION:      Clinical Trials: No       Treatment Recommendations/Referrals: Repeat CT chest x6mo    EVIDENCE BASED NATIONAL TREATMENT GUIDELINES:  Other Radiology recommendation      Please discuss clinical/working stage, TNM, Stage Group, National Treatment Guidelines, and Prognostic Indicators.      Privileged and Confidential Patient Safety Work Product:  The information contained herein has been compiled as part of the [facility] Patient Safety Evaluation System and is deemed to be a Patient Safety Work Product and is privileged and confidential.  Disclaimer:  Multidisciplinary cancer conferences provide consultative services to formulate an effective treatment plan for patients and include physician representation from medical oncology, radiation oncology, radiology, surgery, and pathology.  AJCC or other appropriate staging is discussed, along with site-specific prognostic indicators and treatment planning used by evidence-based treatment guidelines.  Treatment plans which are discussed during conference may/may not necessarily be followed by the patient's managing physician(s), as there may be other factors taken into consideration which impact the treatment plan.  The specific treatment plan is ultimately determined on an individual basis by the patient and the physician(s) involved in his/her care.

## 2021-03-26 ENCOUNTER — TELEPHONE (OUTPATIENT)
Dept: FAMILY MEDICINE CLINIC | Facility: CLINIC | Age: 61
End: 2021-03-26

## 2021-03-26 NOTE — TELEPHONE ENCOUNTER
Contacted patient and advised her to reach out to the ordering provider, pt states she already has the results and just wanted to make PCP aware that she had it done.

## 2021-03-26 NOTE — TELEPHONE ENCOUNTER
Caller: Yeni Olivo    Relationship: Self    Best call back number: [phone]    What is the best time to reach you: ANYTIME    Who are you requesting to speak with (clinical staff, provider,  specific staff member): ALANNA WARREN    Do you know the name of the person who called: PATIENT    What was the call regarding: HER PET SCAN    Do you require a callback: YES

## 2021-04-01 ENCOUNTER — TELEPHONE (OUTPATIENT)
Dept: FAMILY MEDICINE CLINIC | Facility: CLINIC | Age: 61
End: 2021-04-01

## 2021-04-05 ENCOUNTER — TELEMEDICINE (OUTPATIENT)
Dept: PSYCHIATRY | Facility: CLINIC | Age: 61
End: 2021-04-05

## 2021-04-05 DIAGNOSIS — Z79.899 MEDICATION MANAGEMENT: ICD-10-CM

## 2021-04-05 DIAGNOSIS — F43.10 POST TRAUMATIC STRESS DISORDER (PTSD): ICD-10-CM

## 2021-04-05 DIAGNOSIS — F41.1 GENERALIZED ANXIETY DISORDER: Chronic | ICD-10-CM

## 2021-04-05 DIAGNOSIS — F41.0 PANIC DISORDER (EPISODIC PAROXYSMAL ANXIETY): ICD-10-CM

## 2021-04-05 DIAGNOSIS — F33.1 MODERATE EPISODE OF RECURRENT MAJOR DEPRESSIVE DISORDER (HCC): Primary | Chronic | ICD-10-CM

## 2021-04-05 PROCEDURE — 90792 PSYCH DIAG EVAL W/MED SRVCS: CPT | Performed by: NURSE PRACTITIONER

## 2021-04-05 RX ORDER — PRAZOSIN HYDROCHLORIDE 1 MG/1
1 CAPSULE ORAL NIGHTLY
Qty: 30 CAPSULE | Refills: 0 | Status: SHIPPED | OUTPATIENT
Start: 2021-04-05 | End: 2022-10-21 | Stop reason: ALTCHOICE

## 2021-04-05 NOTE — PROGRESS NOTES
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 Treatment             This document has been electronically signed by RODNEY Horton  April 5, 2021 11:39 EDT    Part of this note may be an electronic transcription/translation of spoken language to printed text using the Dragon Dictation System.

## 2021-04-08 ENCOUNTER — LAB (OUTPATIENT)
Dept: LAB | Facility: HOSPITAL | Age: 61
End: 2021-04-08

## 2021-04-08 DIAGNOSIS — Z79.899 MEDICATION MANAGEMENT: ICD-10-CM

## 2021-04-08 LAB
AMPHET+METHAMPHET UR QL: NEGATIVE
AMPHETAMINES UR QL: NEGATIVE
BARBITURATES UR QL SCN: NEGATIVE
BENZODIAZ UR QL SCN: POSITIVE
BUPRENORPHINE SERPL-MCNC: NEGATIVE NG/ML
CANNABINOIDS SERPL QL: POSITIVE
COCAINE UR QL: NEGATIVE
METHADONE UR QL SCN: NEGATIVE
OPIATES UR QL: NEGATIVE
OXYCODONE UR QL SCN: NEGATIVE
PCP UR QL SCN: NEGATIVE
PROPOXYPH UR QL: NEGATIVE
TRICYCLICS UR QL SCN: NEGATIVE

## 2021-04-08 PROCEDURE — 80306 DRUG TEST PRSMV INSTRMNT: CPT

## 2021-04-12 ENCOUNTER — TELEPHONE (OUTPATIENT)
Dept: PSYCHIATRY | Facility: CLINIC | Age: 61
End: 2021-04-12

## 2021-04-14 ENCOUNTER — DOCUMENTATION (OUTPATIENT)
Dept: PSYCHIATRY | Facility: CLINIC | Age: 61
End: 2021-04-14

## 2021-04-14 DIAGNOSIS — F41.0 PANIC DISORDER (EPISODIC PAROXYSMAL ANXIETY): ICD-10-CM

## 2021-04-14 DIAGNOSIS — F41.1 GENERALIZED ANXIETY DISORDER: Primary | ICD-10-CM

## 2021-04-14 RX ORDER — ALPRAZOLAM 0.5 MG/1
0.25 TABLET ORAL 2 TIMES DAILY PRN
Qty: 30 TABLET | Refills: 0 | Status: SHIPPED | OUTPATIENT
Start: 2021-04-14 | End: 2021-05-13 | Stop reason: SDUPTHER

## 2021-04-14 NOTE — PROGRESS NOTES
Patient's urine drug screen reviewed by this APRN at this time.  Patient's urine drug screen positive for THC.  Plan to taper Xanax to discontinue as discussed with patient at last visit.  Taper dose of Xanax 0.25 mg twice daily as needed for anxiety ordered at this time.

## 2021-04-21 ENCOUNTER — TELEMEDICINE (OUTPATIENT)
Dept: PSYCHIATRY | Facility: CLINIC | Age: 61
End: 2021-04-21

## 2021-04-21 DIAGNOSIS — F41.1 GENERALIZED ANXIETY DISORDER: Primary | Chronic | ICD-10-CM

## 2021-04-21 DIAGNOSIS — F43.10 POST TRAUMATIC STRESS DISORDER (PTSD): ICD-10-CM

## 2021-04-21 DIAGNOSIS — F41.0 PANIC DISORDER (EPISODIC PAROXYSMAL ANXIETY): ICD-10-CM

## 2021-04-21 DIAGNOSIS — F33.1 MODERATE EPISODE OF RECURRENT MAJOR DEPRESSIVE DISORDER (HCC): Chronic | ICD-10-CM

## 2021-04-21 DIAGNOSIS — G47.9 SLEEPING DIFFICULTIES: ICD-10-CM

## 2021-04-21 PROCEDURE — 99214 OFFICE O/P EST MOD 30 MIN: CPT | Performed by: NURSE PRACTITIONER

## 2021-04-21 RX ORDER — TRAZODONE HYDROCHLORIDE 50 MG/1
50-100 TABLET ORAL NIGHTLY
Qty: 60 TABLET | Refills: 0 | Status: SHIPPED | OUTPATIENT
Start: 2021-04-21 | End: 2022-10-21 | Stop reason: ALTCHOICE

## 2021-04-21 RX ORDER — PROPRANOLOL HYDROCHLORIDE 10 MG/1
10 TABLET ORAL 3 TIMES DAILY PRN
Qty: 90 TABLET | Refills: 0 | Status: SHIPPED | OUTPATIENT
Start: 2021-04-21 | End: 2022-10-21 | Stop reason: ALTCHOICE

## 2021-04-21 NOTE — PROGRESS NOTES
"This provider is located at the [facility] (through [facility]), [address], [place], [address] using a secure [employer] Video Visit through [employer]. Patient is being seen remotely via telehealth at their home address in [place], and stated they are in a secure environment for this session. The patient's condition being diagnosed/treated is appropriate for telemedicine. The provider identified herself as well as her credentials. The patient, and/or patients guardian, consent to be seen remotely, and when consent is given they understand that the consent allows for patient identifiable information to be sent to a third party as needed. They may refuse to be seen remotely at any time. The electronic data is encrypted and password protected, and the patient and/or guardian has been advised of the potential risks to privacy not withstanding such measures.    You have chosen to receive care through a telehealth visit.  Do you consent to use a video/audio connection for your medical care today? Yes     Subjective   Yeni Olivo is a 60 y.o. female who is here today for medication management follow up.    Chief Complaint: Depression, anxiety, panic attacks, PTSD    History of Present Illness: The patient describes her mood as \"okay\" over the last few weeks.  The patient reports her appetite as good.  The patient reports her sleep as fair.  Patient states that she is still having frequent nightmares. Patient states that she has not started the Prazosin yet. Patient states that she has been nervous to start the medication due to fear of side effects. Patient states that she is currently training for a new job and she is fearful that she will have side effects with beginning a new medication and it will interrupt her training and job performace. Patient states that her training is really intense and she has to be feeling her best in order to keep up her performance. Patient " states that she is planning to being the Prazosin after the training is complete when she would be able to be more tolerable of side effects if they occur. Patient states that her training should be completed in the middle of May.  Patient states that she has been tapering her Xanax over the past 2 weeks. Patient states that rather than decreasing her BID dosing to 0.25 mg, she has just discontinued her morning dose and continued the evening dose of 0.5 mg. Patient states that she is fearful to decrease the evening dose at this time as she feels it will cause sleep changes. Patient states that she needs to be sleeping well during this job training in order to maintain her performance at work. Discussed with patient that we can begin another medication to help with sleep as she continues tapering the Xanax and patient is agreeable to this.  The patient denies any new medical problems or changes in medications since last appointment with this facility.  The patient denies any current side effects from her current medication regimen.  The patient denies any abnormal muscle movements or tics.  The patient rates her depression at a 4/10 on a 0-10 scale, with 10 being the worst.  The patient rates her anxiety at a 7/10 on a 0-10 scale, with 10 being the worst. Patient states that she is still having panic attacks. The patient rates hopelessness at a 0/10 on a 0-10 scale with 10 being the worst.  The patient denies suicidal ideations and homicidal ideations, and is convincing.  The patient denies any auditory hallucinations or visual hallucinations.  The patient does not endorse significant symptoms consistent with bipolar disorder or a psychotic illness.            The patient denies any chance of pregnancy at this time.  The patient was educated that her prescribed medications can have potential risk to a developing fetus. The patient is advised to contact this APRN/this office if she becomes pregnant or plans to become  pregnant.  Pt verbalizes understanding and acknowledged agreement with this plan in her own words.      Prior Psychiatric Medications:  Zoloft - ineffective  Prozac - ineffective  Paxil - ineffective  Trazodone - ineffective    The following portions of the patient's history were reviewed and updated as appropriate: allergies, current medications, past family history, past medical history, past social history, past surgical history and problem list.    Review of Systems   Constitutional: Negative for activity change, appetite change, fatigue and unexpected weight change.   Psychiatric/Behavioral: Positive for decreased concentration and dysphoric mood. Negative for agitation, behavioral problems, confusion, hallucinations, self-injury, sleep disturbance and suicidal ideas. The patient is nervous/anxious. The patient is not hyperactive.        Objective   Physical Exam  Constitutional:       Appearance: Normal appearance.   Neurological:      Mental Status: She is alert.   Psychiatric:         Attention and Perception: Attention and perception normal.         Mood and Affect: Affect normal. Mood is anxious.         Speech: Speech normal.         Behavior: Behavior normal. Behavior is cooperative.         Thought Content: Thought content normal. Thought content does not include homicidal or suicidal ideation. Thought content does not include homicidal or suicidal plan.         Cognition and Memory: Cognition and memory normal.         Judgment: Judgment normal.       not currently breastfeeding.    The patient was seen remotely today via a MyChart Video Visit through [facility].  Unable to obtain vital signs due to nature of remote visit.  Height stated at 63 inches.  Weight stated at 126 pounds.     Allergies   Allergen Reactions   • Penicillins Anaphylaxis   • Codeine Nausea Only   • Reglan [Metoclopramide] Unknown (See Comments)     neurological   • Talwin [Pentazocine] Other (See Comments)     Oral swelling.         Recent Results (from the past 2016 hour(s))   COVID-19 PCR, [facility], NP SWAB IN [facility] [employer] MEDIA 24-30 HR TAT - Swab, Nasopharynx    Collection Time: 02/22/21  2:53 PM    Specimen: Nasopharynx; Swab   Result Value Ref Range    SARS-CoV-2 SUMI Not Detected Not Detected   POC Glucose Once    Collection Time: 03/18/21  7:55 AM    Specimen: Blood   Result Value Ref Range    Glucose 99 70 - 130 mg/dL   CBC Auto Differential    Collection Time: 03/18/21  7:56 AM    Specimen: Blood   Result Value Ref Range    WBC 5.54 3.40 - 10.80 10*3/mm3    RBC 4.02 3.77 - 5.28 10*6/mm3    Hemoglobin 14.2 12.0 - 15.9 g/dL    Hematocrit 41.0 34.0 - 46.6 %    .0 (H) 79.0 - 97.0 fL    MCH 35.3 (H) 26.6 - 33.0 pg    MCHC 34.6 31.5 - 35.7 g/dL    RDW 11.5 (L) 12.3 - 15.4 %    RDW-SD 42.6 37.0 - 54.0 fl    MPV 8.7 6.0 - 12.0 fL    Platelets 249 140 - 450 10*3/mm3    Neutrophil % 65.4 42.7 - 76.0 %    Lymphocyte % 25.1 19.6 - 45.3 %    Monocyte % 7.0 5.0 - 12.0 %    Eosinophil % 1.8 0.3 - 6.2 %    Basophil % 0.5 0.0 - 1.5 %    Immature Grans % 0.2 0.0 - 0.5 %    Neutrophils, Absolute 3.62 1.70 - 7.00 10*3/mm3    Lymphocytes, Absolute 1.39 0.70 - 3.10 10*3/mm3    Monocytes, Absolute 0.39 0.10 - 0.90 10*3/mm3    Eosinophils, Absolute 0.10 0.00 - 0.40 10*3/mm3    Basophils, Absolute 0.03 0.00 - 0.20 10*3/mm3    Immature Grans, Absolute 0.01 0.00 - 0.05 10*3/mm3    nRBC 0.0 0.0 - 0.2 /100 WBC   Comprehensive Metabolic Panel    Collection Time: 03/18/21  7:56 AM    Specimen: Blood   Result Value Ref Range    Glucose 87 65 - 99 mg/dL    BUN 18 8 - 23 mg/dL    Creatinine 0.71 0.57 - 1.00 mg/dL    Sodium 141 136 - 145 mmol/L    Potassium 5.5 (H) 3.5 - 5.2 mmol/L    Chloride 104 98 - 107 mmol/L    CO2 28.6 22.0 - 29.0 mmol/L    Calcium 9.6 8.6 - 10.5 mg/dL    Total Protein 6.5 6.0 - 8.5 g/dL    Albumin 4.70 3.50 - 5.20 g/dL    ALT (SGPT) 20 1 - 33 U/L    AST (SGOT) 21 1 - 32 U/L    Alkaline Phosphatase 96 39 - 117 U/L    Total  Bilirubin 0.4 0.0 - 1.2 mg/dL    eGFR Non African Amer 84 >60 mL/min/1.73    Globulin 1.8 gm/dL    A/G Ratio 2.6 g/dL    BUN/Creatinine Ratio 25.4 (H) 7.0 - 25.0    Anion Gap 8.4 5.0 - 15.0 mmol/L   TSH Rfx On Abnormal To Free T4    Collection Time: 03/18/21  7:56 AM    Specimen: Blood   Result Value Ref Range    TSH 2.000 0.270 - 4.200 uIU/mL   Lipid Panel    Collection Time: 03/18/21  7:56 AM    Specimen: Blood   Result Value Ref Range    Total Cholesterol 202 (H) 0 - 200 mg/dL    Triglycerides 60 0 - 150 mg/dL    HDL Cholesterol 95 (H) 40 - 60 mg/dL    LDL Cholesterol  96 0 - 100 mg/dL    VLDL Cholesterol 11 5 - 40 mg/dL    LDL/HDL Ratio 1.00    Vitamin D 25 Hydroxy    Collection Time: 03/18/21  7:56 AM    Specimen: Blood   Result Value Ref Range    25 Hydroxy, Vitamin D 24.5 ng/ml   Urine Drug Screen - Urine, Clean Catch    Collection Time: 04/08/21  8:06 AM    Specimen: Urine, Clean Catch   Result Value Ref Range    THC, Screen, Urine Positive (A) Negative    Phencyclidine (PCP), Urine Negative Negative    Cocaine Screen, Urine Negative Negative    Methamphetamine, Ur Negative Negative    Opiate Screen Negative Negative    Amphetamine Screen, Urine Negative Negative    Benzodiazepine Screen, Urine Positive (A) Negative    Tricyclic Antidepressants Screen Negative Negative    Methadone Screen, Urine Negative Negative    Barbiturates Screen, Urine Negative Negative    Oxycodone Screen, Urine Negative Negative    Propoxyphene Screen Negative Negative    Buprenorphine, Screen, Urine Negative Negative       Current Medications:   Current Outpatient Medications   Medication Sig Dispense Refill   • acetaminophen (TYLENOL) 500 MG tablet Take 500 mg by mouth Every 6 (Six) Hours As Needed for Mild Pain . Pt states she takes 2 in the AM & 1 in the PM     • acyclovir (ZOVIRAX) 400 MG tablet Take 1 tablet by mouth 2 (two) times a day. 180 tablet 1   • ALPRAZolam (XANAX) 0.5 MG tablet Take 0.5 tablets by mouth 2 (Two) Times  a Day As Needed for Anxiety. 30 tablet 0   • Ibuprofen 200 MG capsule Take  by mouth. Pt states she takes 3 in the AM & 2 in the PM     • venlafaxine XR (EFFEXOR-XR) 150 MG 24 hr capsule Take 1 capsule by mouth Daily. 90 capsule 1   • prazosin (MINIPRESS) 1 MG capsule Take 1 capsule by mouth Every Night. 30 capsule 0   • propranolol (INDERAL) 10 MG tablet Take 1 tablet by mouth 3 (Three) Times a Day As Needed (anxiety/panic). 90 tablet 0   • traZODone (DESYREL) 50 MG tablet Take 1-2 tablets by mouth Every Night. 60 tablet 0     No current facility-administered medications for this visit.       Mental Status Exam:   Hygiene:   good  Cooperation:  Cooperative  Eye Contact:  Good  Psychomotor Behavior:  Appropriate  Affect:  Full range  Hopelessness: Denies  Speech:  Normal  Thought Process:  Goal directed  Thought Content:  Normal  Suicidal:  None  Homicidal:  None  Hallucinations:  None  Delusion:  None  Memory:  Intact  Orientation:  Person, Place, Time and Situation  Reliability:  good  Insight:  Good  Judgement:  Good  Impulse Control:  Good  Physical/Medical Issues:  Yes See medical history    PHQ-9 Depression Screening  Little interest or pleasure in doing things? (P) 1   Feeling down, depressed, or hopeless? (P) 0   Trouble falling or staying asleep, or sleeping too much? (P) 1   Feeling tired or having little energy? (P) 1   Poor appetite or overeating? (P) 0   Feeling bad about yourself - or that you are a failure or have let yourself or your family down? (P) 0   Trouble concentrating on things, such as reading the newspaper or watching television? (P) 0   Moving or speaking so slowly that other people could have noticed? Or the opposite - being so fidgety or restless that you have been moving around a lot more than usual? (P) 0   Thoughts that you would be better off dead, or of hurting yourself in some way? (P) 0   PHQ-9 Total Score (P) 3   If you checked off any problems, how difficult have these problems  made it for you to do your work, take care of things at home, or get along with other people? (P) Somewhat difficult        PHQ-Score Total:  PHQ-9 Total Score: (P) 3    LYNETTE-7  Feeling nervous, anxious or on edge: Several days  Not being able to stop or control worrying: Several days  Worrying too much about different things: Several days  Trouble Relaxing: Several days  Being so restless that it is hard to sit still: Not at all  Feeling afraid as if something awful might happen: Not at all  Becoming easily annoyed or irritable: Several days  LYNETTE 7 Total Score: 5  If you checked any problems, how difficult have these problems made it for you to do your work, take care of things at home, or get along with other people: Somewhat difficult       PROMIS scale screening tool that patient filled out virtually reviewed by this APRN at today's encounter.     Assessment/Plan   Diagnoses and all orders for this visit:    1. Generalized anxiety disorder (Primary)  -     propranolol (INDERAL) 10 MG tablet; Take 1 tablet by mouth 3 (Three) Times a Day As Needed (anxiety/panic).  Dispense: 90 tablet; Refill: 0    2. Panic disorder (episodic paroxysmal anxiety)  -     propranolol (INDERAL) 10 MG tablet; Take 1 tablet by mouth 3 (Three) Times a Day As Needed (anxiety/panic).  Dispense: 90 tablet; Refill: 0    3. Moderate episode of recurrent major depressive disorder (CMS/HCC)    4. Post traumatic stress disorder (PTSD)    5. Sleeping difficulties  -     traZODone (DESYREL) 50 MG tablet; Take 1-2 tablets by mouth Every Night.  Dispense: 60 tablet; Refill: 0            Visit Diagnoses:    ICD-10-CM ICD-9-CM   1. Generalized anxiety disorder  F41.1 300.02   2. Panic disorder (episodic paroxysmal anxiety)  F41.0 300.01   3. Moderate episode of recurrent major depressive disorder (CMS/HCC)  F33.1 296.32   4. Post traumatic stress disorder (PTSD)  F43.10 309.81   5. Sleeping difficulties  G47.9 780.50       GOALS:  Short Term Goals:  Patient will be compliant with medication, and patient will have no significant medication related side effects.  Patient will be engaged in psychotherapy as indicated.  Patient will report subjective improvement of symptoms.  Long term goals: To stabilize mood and treat/improve subjective symptoms, the patient will stay out of the hospital, the patient will be at an optimal level of functioning, and the patient will take all medications as prescribed.  The patient verbalized understanding and agreement with goals that were mutually set.      TREATMENT PLAN/GOALS: Continue medications and treatment plan as indicated. Treatment and medication options discussed during today's visit. Patient acknowledged and verbally consented to continue with current treatment plan and was educated on the importance of compliance with treatment and follow-up appointments.      -Continue Effexor  mg daily. Discussed with patient changing this medication due to ineffiacacy and patient states that she is agreeable to this, but endorses that she prefer to wait until she has completed her training for her new job.  Patient endorses she is fearful to begin making medication changes while she is inn training for her new job as she fears she will have side effects that will affect her job performance. Discussed with patient that we can begin making medication changes once training is complete next month and patient is agreeable to this.  -Begin Prazosin 1 mg nightly once patient has completed training for new job. Patient estimates she will begin this medication in the middle of May.   -Begin Propanolol 10 mg TID PRN for anxiety/panic. Counseled patient not to use this medication if heart rate is below 60 bpm and not to use with Prazosin. Patient verbalizes understanding.   -Patient reports she has been tapering Xanax, but endorses that she did not follow tapering plan. States that rather than decreasing her morning and nightly dose to  0.25 mg she just stopped taking the morning dose and continued the nightly dose. Patient states that she did this because she is fearful that she will not sleep if she decreases the nightly dose. Discussed with patient that this is okay as she was still tapering total daily dose. Discussed with patient that at today's visit we will continue with tapering plan and decrease her nightly dose to 0.25 mg at this time. Discussed with patient that we can begin another medication for sleep as we are tapering off the nightly dose of Xanax and patient is agreeable to this.   -Begin Trazodone  mg nightly for sleep        MEDICATION ISSUES: Discussed medication options and treatment plan of prescribed medication, any off label use of medication, as well as the risks, benefits, any black box warnings including increased suicidality, and side effects including but not limited to potential falls, dizziness, possible impaired driving, GI side effects (change in appetite, abdominal discomfort, nausea, vomiting, diarrhea, and/or constipation), dry mouth, somnolence, sedation, insomnia, activation, agitation, irritation, tremors, abnormal muscle movements or disorders, headache, sweating, possible bruising or rare bleeding, electrolyte and/or fluid abnormalities, change in blood pressure/heart rate/and or heart rhythm, sexual dysfunction, and metabolic adversities among others. Patient and/or guardian agreeable to call the office with any worsening of symptoms or onset of side effects, or if any concerns or questions arise.  The contact information for the office is made available to the patient and/or guardian.  Patient and/or guardian agreeable to call 911 or go to the nearest ER should they begin having any SI/HI, or if any urgent concerns arise. No medication side effects or related complaints today.    This APRN has discussed with the patient/guardian about the possibility of serotonin syndrome when certain medications are  taken together, as is the case with this patient.  This APRN has provided the patient/guardian with a list of symptoms of serotonin syndrome including symptoms of autonomic instability, altered sensorium, confusion, restlessness, agitation, myoclonus, hyperreflexia, hyperthermia, diaphoresis, tremor, chills, diarrhea and cramps, ataxia, headache, migraines, seizures, and insomnia; which could lead to permanent hyperthermic brain damage, cardiovascular collapse, coma, or even death.  The patient/guardian are instructed to stop medications immediately and either contact this APRN/this office during regular office hours, or go to the emergency department/call 911, if they begin to experience any of the symptoms discussed.  The benefits and risks of the current medication regimen are discussed with the patient/guardian, and they feel that the benefits out weigh the risks.  The patient/guardian verbalized understanding and agreement in their own words.    The patient is being prescribed a controlled substance as part of the treatment plan.  The patient is currently being tapered off controlled substance due to risks with long-term use of this medication and concurrent use with marijuana. The patient has been educated of appropriate use of the medication, including risks and side effects such as somnolence, limited ability to drive and/or work or function safely, potential for dependence, respiratory depression, falls, change in blood pressure, changes in heart rhythm or heart rate, activation of other mental illnesses, and overdose among others. Patient is also informed that the medication is to be used by the patient only, and to avoid any combined use of ETOH, or other substances, with this medication unless prescribed and as directed by a Provider.  The patient verbalized understanding and agreement with this in their own words.        MEDS ORDERED DURING VISIT:  New Medications Ordered This Visit   Medications   •  traZODone (DESYREL) 50 MG tablet     Sig: Take 1-2 tablets by mouth Every Night.     Dispense:  60 tablet     Refill:  0   • propranolol (INDERAL) 10 MG tablet     Sig: Take 1 tablet by mouth 3 (Three) Times a Day As Needed (anxiety/panic).     Dispense:  90 tablet     Refill:  0       Return in about 3 weeks (around 5/12/2021), or if symptoms worsen or fail to improve, for Recheck.       Patient will follow-up in 3 weeks, highly encouraged the patient if she had any questions or concerns to contact the behavioral health [facility] clinic for sooner appointment patient verbalized understanding.      Functional Status: Moderate impairment     Prognosis: Guarded with Ongoing Treatment            This document has been electronically signed by RODNEY Horton  April 21, 2021 11:58 EDT    Part of this note may be an electronic transcription/translation of spoken language to printed text using the Dragon Dictation System.

## 2021-05-13 ENCOUNTER — TELEMEDICINE (OUTPATIENT)
Dept: PSYCHIATRY | Facility: CLINIC | Age: 61
End: 2021-05-13

## 2021-05-13 DIAGNOSIS — F41.1 GENERALIZED ANXIETY DISORDER: Primary | Chronic | ICD-10-CM

## 2021-05-13 DIAGNOSIS — F41.0 PANIC DISORDER (EPISODIC PAROXYSMAL ANXIETY): ICD-10-CM

## 2021-05-13 DIAGNOSIS — F33.1 MODERATE EPISODE OF RECURRENT MAJOR DEPRESSIVE DISORDER (HCC): Chronic | ICD-10-CM

## 2021-05-13 DIAGNOSIS — G47.9 SLEEPING DIFFICULTIES: ICD-10-CM

## 2021-05-13 DIAGNOSIS — F43.10 POST TRAUMATIC STRESS DISORDER (PTSD): ICD-10-CM

## 2021-05-13 PROCEDURE — 99214 OFFICE O/P EST MOD 30 MIN: CPT | Performed by: NURSE PRACTITIONER

## 2021-05-13 RX ORDER — ALPRAZOLAM 0.25 MG/1
0.25 TABLET ORAL NIGHTLY PRN
Qty: 30 TABLET | Refills: 0 | Status: SHIPPED | OUTPATIENT
Start: 2021-05-13 | End: 2022-10-21 | Stop reason: ALTCHOICE

## 2021-05-13 RX ORDER — VENLAFAXINE HYDROCHLORIDE 150 MG/1
150 CAPSULE, EXTENDED RELEASE ORAL DAILY
Qty: 90 CAPSULE | Refills: 0 | Status: SHIPPED | OUTPATIENT
Start: 2021-05-13

## 2021-05-13 RX ORDER — BUSPIRONE HYDROCHLORIDE 5 MG/1
5 TABLET ORAL 2 TIMES DAILY
Qty: 60 TABLET | Refills: 0 | Status: SHIPPED | OUTPATIENT
Start: 2021-05-13 | End: 2022-10-21 | Stop reason: ALTCHOICE

## 2021-05-13 NOTE — PROGRESS NOTES
"This provider is located at the [facility] (through [facility]), [address], [place], [address] using a secure [employer] Video Visit through [employer]. Patient is being seen remotely via telehealth at their home address in [place], and stated they are in a secure environment for this session. The patient's condition being diagnosed/treated is appropriate for telemedicine. The provider identified herself as well as her credentials. The patient, and/or patients guardian, consent to be seen remotely, and when consent is given they understand that the consent allows for patient identifiable information to be sent to a third party as needed. They may refuse to be seen remotely at any time. The electronic data is encrypted and password protected, and the patient and/or guardian has been advised of the potential risks to privacy not withstanding such measures.    You have chosen to receive care through a telehealth visit.  Do you consent to use a video/audio connection for your medical care today? Yes     Subjective   Yeni Olivo is a 60 y.o. female who is here today for medication management follow up.    Chief Complaint: Depression, anxiety, panic attacks, PTSD    History of Present Illness: The patient describes her mood as \"stressed\" over the last few weeks.  Patient states she has been extremely busy lately.  Patient states her training with her new job has been very intense.  Patient states he does not feel competent in her new job yet even though her instructors are telling her she is doing well.  Patient states this is increased her anxiety and stress.  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  Patient states she is also in the process of moving and has to be moved out by the end of the month.  Patient states this is been extremely stressful for her as well.  Patient states her " depression has been well controlled, but endorses that anxiety has been increased due to situational stressors.  Patient states she is afraid to make medication changes at this time due to fear of exacerbation of symptoms while she is under such stress with work and moving.  Patient states she feels as if anxiety will be more controlled once she is establishing a new job for a few weeks after she is moved.  Patient states she would feel more comfortable with making medication changes at that time.  Patient states her anxiety tends to be increased at night.  Patient states after she has while down for the day she will think about work and moving in these thoughts increase her stress and anxiety.  The patient reports her appetite as good.  The patient reports her sleep as fair. Patient states that she is still waking up every two hours at night.  Patient states that she lays there for 30-45 minutes before she can fall back asleep. Patient states that she is still having frequent nightmares. Patient states that she did try to Prazosin two nights, but states that she did not continue the medication due to fear of side effects. States that she fears it will decrease her blood pressure. States that she cannot remember if she had nightmares the nights that she took the Prazosin or not.  Patient states she visited and stayed with her sister recently.  Patient states her sister told her she was talking and yelling out in her sleep.  Patient states she was not aware that her nightmares are having such an effect on her sleep.  Patient states she has not started trazodone yet as she is fearful because her feel groggy in the mornings and affect her focus and concentration at work.  The patient denies any new medical problems or changes in medications since last appointment with this facility.  The patient denies any current side effects from her current medication regimen.  The patient denies any abnormal muscle movements or  tics.  The patient rates her depression at a 5/10 on a 0-10 scale, with 10 being the worst.  The patient rates her anxiety at a 7/10 on a 0-10 scale, with 10 being the worst. Patient states that she is still having panic attacks, but states they have not been as intense as they were last month. The patient rates hopelessness at a 0/10 on a 0-10 scale with 10 being the worst. The patient denies suicidal ideations and homicidal ideations, and is convincing.  The patient denies any auditory hallucinations or visual hallucinations.  The patient does not endorse significant symptoms consistent with bipolar disorder or a psychotic illness.                The patient denies any chance of pregnancy at this time.  The patient was educated that her prescribed medications can have potential risk to a developing fetus. The patient is advised to contact this APRN/this office if she becomes pregnant or plans to become pregnant.  Pt verbalizes understanding and acknowledged agreement with this plan in her own words.      Prior Psychiatric Medications:  Zoloft - ineffective  Prozac - ineffective  Paxil - ineffective  Trazodone - ineffective    The following portions of the patient's history were reviewed and updated as appropriate: allergies, current medications, past family history, past medical history, past social history, past surgical history and problem list.    Review of Systems   Constitutional: Negative for activity change, appetite change, fatigue and unexpected weight change.   Psychiatric/Behavioral: Positive for decreased concentration, dysphoric mood and sleep disturbance. Negative for agitation, behavioral problems, confusion, hallucinations, self-injury and suicidal ideas. The patient is nervous/anxious. The patient is not hyperactive.        Objective   Physical Exam  Constitutional:       Appearance: Normal appearance.   Neurological:      Mental Status: She is alert.   Psychiatric:         Attention and  Perception: Attention and perception normal.         Mood and Affect: Affect normal. Mood is anxious.         Speech: Speech normal.         Behavior: Behavior normal. Behavior is cooperative.         Thought Content: Thought content normal. Thought content does not include homicidal or suicidal ideation. Thought content does not include homicidal or suicidal plan.         Cognition and Memory: Cognition and memory normal.         Judgment: Judgment normal.       not currently breastfeeding.    The patient was seen remotely today via a MyChart Video Visit through [facility].  Unable to obtain vital signs due to nature of remote visit.  Height stated at 63 inches.  Weight stated at 126 pounds.     Allergies   Allergen Reactions   • Penicillins Anaphylaxis   • Codeine Nausea Only   • Reglan [Metoclopramide] Unknown (See Comments)     neurological   • Talwin [Pentazocine] Other (See Comments)     Oral swelling.        Recent Results (from the past 2016 hour(s))   COVID-19 PCR, [facility], NP SWAB IN [facility] 24-30 HR TAT - Swab, Nasopharynx    Collection Time: 02/22/21  2:53 PM    Specimen: Nasopharynx; Swab   Result Value Ref Range    SARS-CoV-2 SUMI Not Detected Not Detected   POC Glucose Once    Collection Time: 03/18/21  7:55 AM    Specimen: Blood   Result Value Ref Range    Glucose 99 70 - 130 mg/dL   CBC Auto Differential    Collection Time: 03/18/21  7:56 AM    Specimen: Blood   Result Value Ref Range    WBC 5.54 3.40 - 10.80 10*3/mm3    RBC 4.02 3.77 - 5.28 10*6/mm3    Hemoglobin 14.2 12.0 - 15.9 g/dL    Hematocrit 41.0 34.0 - 46.6 %    .0 (H) 79.0 - 97.0 fL    MCH 35.3 (H) 26.6 - 33.0 pg    MCHC 34.6 31.5 - 35.7 g/dL    RDW 11.5 (L) 12.3 - 15.4 %    RDW-SD 42.6 37.0 - 54.0 fl    MPV 8.7 6.0 - 12.0 fL    Platelets 249 140 - 450 10*3/mm3    Neutrophil % 65.4 42.7 - 76.0 %    Lymphocyte % 25.1 19.6 - 45.3 %    Monocyte % 7.0 5.0 - 12.0 %    Eosinophil % 1.8 0.3 - 6.2 %    Basophil % 0.5 0.0 -  1.5 %    Immature Grans % 0.2 0.0 - 0.5 %    Neutrophils, Absolute 3.62 1.70 - 7.00 10*3/mm3    Lymphocytes, Absolute 1.39 0.70 - 3.10 10*3/mm3    Monocytes, Absolute 0.39 0.10 - 0.90 10*3/mm3    Eosinophils, Absolute 0.10 0.00 - 0.40 10*3/mm3    Basophils, Absolute 0.03 0.00 - 0.20 10*3/mm3    Immature Grans, Absolute 0.01 0.00 - 0.05 10*3/mm3    nRBC 0.0 0.0 - 0.2 /100 WBC   Comprehensive Metabolic Panel    Collection Time: 03/18/21  7:56 AM    Specimen: Blood   Result Value Ref Range    Glucose 87 65 - 99 mg/dL    BUN 18 8 - 23 mg/dL    Creatinine 0.71 0.57 - 1.00 mg/dL    Sodium 141 136 - 145 mmol/L    Potassium 5.5 (H) 3.5 - 5.2 mmol/L    Chloride 104 98 - 107 mmol/L    CO2 28.6 22.0 - 29.0 mmol/L    Calcium 9.6 8.6 - 10.5 mg/dL    Total Protein 6.5 6.0 - 8.5 g/dL    Albumin 4.70 3.50 - 5.20 g/dL    ALT (SGPT) 20 1 - 33 U/L    AST (SGOT) 21 1 - 32 U/L    Alkaline Phosphatase 96 39 - 117 U/L    Total Bilirubin 0.4 0.0 - 1.2 mg/dL    eGFR Non African Amer 84 >60 mL/min/1.73    Globulin 1.8 gm/dL    A/G Ratio 2.6 g/dL    BUN/Creatinine Ratio 25.4 (H) 7.0 - 25.0    Anion Gap 8.4 5.0 - 15.0 mmol/L   TSH Rfx On Abnormal To Free T4    Collection Time: 03/18/21  7:56 AM    Specimen: Blood   Result Value Ref Range    TSH 2.000 0.270 - 4.200 uIU/mL   Lipid Panel    Collection Time: 03/18/21  7:56 AM    Specimen: Blood   Result Value Ref Range    Total Cholesterol 202 (H) 0 - 200 mg/dL    Triglycerides 60 0 - 150 mg/dL    HDL Cholesterol 95 (H) 40 - 60 mg/dL    LDL Cholesterol  96 0 - 100 mg/dL    VLDL Cholesterol 11 5 - 40 mg/dL    LDL/HDL Ratio 1.00    Vitamin D 25 Hydroxy    Collection Time: 03/18/21  7:56 AM    Specimen: Blood   Result Value Ref Range    25 Hydroxy, Vitamin D 24.5 ng/ml   Urine Drug Screen - Urine, Clean Catch    Collection Time: 04/08/21  8:06 AM    Specimen: Urine, Clean Catch   Result Value Ref Range    THC, Screen, Urine Positive (A) Negative    Phencyclidine (PCP), Urine Negative Negative     Cocaine Screen, Urine Negative Negative    Methamphetamine, Ur Negative Negative    Opiate Screen Negative Negative    Amphetamine Screen, Urine Negative Negative    Benzodiazepine Screen, Urine Positive (A) Negative    Tricyclic Antidepressants Screen Negative Negative    Methadone Screen, Urine Negative Negative    Barbiturates Screen, Urine Negative Negative    Oxycodone Screen, Urine Negative Negative    Propoxyphene Screen Negative Negative    Buprenorphine, Screen, Urine Negative Negative       Current Medications:   Current Outpatient Medications   Medication Sig Dispense Refill   • acetaminophen (TYLENOL) 500 MG tablet Take 500 mg by mouth Every 6 (Six) Hours As Needed for Mild Pain . Pt states she takes 2 in the AM & 1 in the PM     • acyclovir (ZOVIRAX) 400 MG tablet Take 1 tablet by mouth 2 (two) times a day. 180 tablet 1   • ALPRAZolam (XANAX) 0.25 MG tablet Take 1 tablet by mouth At Night As Needed for Anxiety. 30 tablet 0   • Ibuprofen 200 MG capsule Take  by mouth. Pt states she takes 3 in the AM & 2 in the PM     • venlafaxine XR (EFFEXOR-XR) 150 MG 24 hr capsule Take 1 capsule by mouth Daily. 90 capsule 0   • busPIRone (BUSPAR) 5 MG tablet Take 1 tablet by mouth 2 (Two) Times a Day. 60 tablet 0   • prazosin (MINIPRESS) 1 MG capsule Take 1 capsule by mouth Every Night. 30 capsule 0   • propranolol (INDERAL) 10 MG tablet Take 1 tablet by mouth 3 (Three) Times a Day As Needed (anxiety/panic). 90 tablet 0   • traZODone (DESYREL) 50 MG tablet Take 1-2 tablets by mouth Every Night. 60 tablet 0     No current facility-administered medications for this visit.       Mental Status Exam:   Hygiene:   good  Cooperation:  Cooperative  Eye Contact:  Good  Psychomotor Behavior:  Appropriate  Affect:  Full range  Hopelessness: Denies  Speech:  Normal  Thought Process:  Goal directed  Thought Content:  Normal  Suicidal:  None  Homicidal:  None  Hallucinations:  None  Delusion:  None  Memory:  Intact  Orientation:   Person, Place, Time and Situation  Reliability:  good  Insight:  Good  Judgement:  Good  Impulse Control:  Good  Physical/Medical Issues:  Yes See medical history    PHQ-9 Depression Screening  Little interest or pleasure in doing things? (P) 1   Feeling down, depressed, or hopeless? (P) 0   Trouble falling or staying asleep, or sleeping too much? (P) 1   Feeling tired or having little energy? (P) 1   Poor appetite or overeating? (P) 0   Feeling bad about yourself - or that you are a failure or have let yourself or your family down? (P) 0   Trouble concentrating on things, such as reading the newspaper or watching television? (P) 0   Moving or speaking so slowly that other people could have noticed? Or the opposite - being so fidgety or restless that you have been moving around a lot more than usual? (P) 0   Thoughts that you would be better off dead, or of hurting yourself in some way? (P) 0   PHQ-9 Total Score (P) 3   If you checked off any problems, how difficult have these problems made it for you to do your work, take care of things at home, or get along with other people? (P) Somewhat difficult        PHQ-Score Total:  PHQ-9 Total Score: (P) 3    LYNETTE-7  Feeling nervous, anxious or on edge: (P) Several days  Not being able to stop or control worrying: (P) Several days  Worrying too much about different things: (P) Several days  Trouble Relaxing: (P) Several days  Being so restless that it is hard to sit still: (P) Not at all  Feeling afraid as if something awful might happen: (P) Not at all  Becoming easily annoyed or irritable: (P) Several days  LYNETTE 7 Total Score: (P) 5  If you checked any problems, how difficult have these problems made it for you to do your work, take care of things at home, or get along with other people: (P) Somewhat difficult       PROMIS scale screening tool that patient filled out virtually reviewed by this APRN at today's encounter.     Assessment/Plan   Diagnoses and all orders for  this visit:    1. Generalized anxiety disorder (Primary)  -     venlafaxine XR (EFFEXOR-XR) 150 MG 24 hr capsule; Take 1 capsule by mouth Daily.  Dispense: 90 capsule; Refill: 0  -     ALPRAZolam (XANAX) 0.25 MG tablet; Take 1 tablet by mouth At Night As Needed for Anxiety.  Dispense: 30 tablet; Refill: 0  -     busPIRone (BUSPAR) 5 MG tablet; Take 1 tablet by mouth 2 (Two) Times a Day.  Dispense: 60 tablet; Refill: 0    2. Panic disorder (episodic paroxysmal anxiety)  -     venlafaxine XR (EFFEXOR-XR) 150 MG 24 hr capsule; Take 1 capsule by mouth Daily.  Dispense: 90 capsule; Refill: 0  -     ALPRAZolam (XANAX) 0.25 MG tablet; Take 1 tablet by mouth At Night As Needed for Anxiety.  Dispense: 30 tablet; Refill: 0  -     busPIRone (BUSPAR) 5 MG tablet; Take 1 tablet by mouth 2 (Two) Times a Day.  Dispense: 60 tablet; Refill: 0    3. Moderate episode of recurrent major depressive disorder (CMS/HCC)  -     venlafaxine XR (EFFEXOR-XR) 150 MG 24 hr capsule; Take 1 capsule by mouth Daily.  Dispense: 90 capsule; Refill: 0    4. Post traumatic stress disorder (PTSD)    5. Sleeping difficulties            Visit Diagnoses:    ICD-10-CM ICD-9-CM   1. Generalized anxiety disorder  F41.1 300.02   2. Panic disorder (episodic paroxysmal anxiety)  F41.0 300.01   3. Moderate episode of recurrent major depressive disorder (CMS/HCC)  F33.1 296.32   4. Post traumatic stress disorder (PTSD)  F43.10 309.81   5. Sleeping difficulties  G47.9 780.50       GOALS:  Short Term Goals: Patient will be compliant with medication, and patient will have no significant medication related side effects.  Patient will be engaged in psychotherapy as indicated.  Patient will report subjective improvement of symptoms.  Long term goals: To stabilize mood and treat/improve subjective symptoms, the patient will stay out of the hospital, the patient will be at an optimal level of functioning, and the patient will take all medications as prescribed.  The  patient verbalized understanding and agreement with goals that were mutually set.      TREATMENT PLAN/GOALS: Continue medications and treatment plan as indicated. Treatment and medication options discussed during today's visit. Patient acknowledged and verbally consented to continue with current treatment plan and was educated on the importance of compliance with treatment and follow-up appointments.      -Continue Effexor  mg daily  -Begin Buspar 5 mg twice daily for anxiety   -Begin Prazosin 1 mg nightly. Patient states that she has been fearful to start this medication and only took two doses after it was prescribed last month. Educated patient about this medication and symptoms of low blood pressure and reassured patient that medication would be discontinued if she experienced any of these symptoms and patient is agreeable to being medication.  -Continue Propanolol 10 mg TID PRN for anxiety/panic. Counseled patient not to use this medication if heart rate is below 60 bpm and not to use with Prazosin. Patient verbalizes understanding. Patient states that she has not had to use this medication yet as she has still been using the Xanax at night for anxiety.   -Decrease Xanax to 0.25 mg nightly PRN for anxiety. Discussed with patient that medication will be discontinued at next visit as taper will be complete and patient verbalizes understanding. Discussed with patient that if she has difficulty sleeping with decreased dose of Xanax that she can use Trazodone as needed for sleep and patient verbalizes understanding.   -Begin Trazodone  mg nightly PRN for sleep        MEDICATION ISSUES: Discussed medication options and treatment plan of prescribed medication, any off label use of medication, as well as the risks, benefits, any black box warnings including increased suicidality, and side effects including but not limited to potential falls, dizziness, possible impaired driving, GI side effects (change in  appetite, abdominal discomfort, nausea, vomiting, diarrhea, and/or constipation), dry mouth, somnolence, sedation, insomnia, activation, agitation, irritation, tremors, abnormal muscle movements or disorders, headache, sweating, possible bruising or rare bleeding, electrolyte and/or fluid abnormalities, change in blood pressure/heart rate/and or heart rhythm, sexual dysfunction, and metabolic adversities among others. Patient and/or guardian agreeable to call the office with any worsening of symptoms or onset of side effects, or if any concerns or questions arise.  The contact information for the office is made available to the patient and/or guardian.  Patient and/or guardian agreeable to call 911 or go to the nearest ER should they begin having any SI/HI, or if any urgent concerns arise. No medication side effects or related complaints today.    This APRN has discussed with the patient/guardian about the possibility of serotonin syndrome when certain medications are taken together, as is the case with this patient.  This APRN has provided the patient/guardian with a list of symptoms of serotonin syndrome including symptoms of autonomic instability, altered sensorium, confusion, restlessness, agitation, myoclonus, hyperreflexia, hyperthermia, diaphoresis, tremor, chills, diarrhea and cramps, ataxia, headache, migraines, seizures, and insomnia; which could lead to permanent hyperthermic brain damage, cardiovascular collapse, coma, or even death.  The patient/guardian are instructed to stop medications immediately and either contact this APRN/this office during regular office hours, or go to the emergency department/call 911, if they begin to experience any of the symptoms discussed.  The benefits and risks of the current medication regimen are discussed with the patient/guardian, and they feel that the benefits out weigh the risks.  The patient/guardian verbalized understanding and agreement in their own  words.    The patient is being prescribed a controlled substance as part of the treatment plan. The patient has been educated of appropriate use of the medication, including risks and side effects such as somnolence, limited ability to drive and/or work or function safely, potential for dependence, respiratory depression, falls, change in blood pressure, changes in heart rhythm or heart rate, activation of other mental illnesses, and overdose among others. Patient is also informed that the medication is to be used by the patient only, and to avoid any combined use of ETOH, or other substances, with this medication unless prescribed and as directed by a Provider.  The patient verbalized understanding and agreement with this in their own words.          MEDS ORDERED DURING VISIT:  New Medications Ordered This Visit   Medications   • venlafaxine XR (EFFEXOR-XR) 150 MG 24 hr capsule     Sig: Take 1 capsule by mouth Daily.     Dispense:  90 capsule     Refill:  0     PATIENT CHOSEN ADJUDICATION OUTSIDE OF INSURANCE: $[27.14]; ADJUDICATE WITH: BIN [[id number]] PCN [[id number]] GRP [CV20] MBR [[id number]]   • ALPRAZolam (XANAX) 0.25 MG tablet     Sig: Take 1 tablet by mouth At Night As Needed for Anxiety.     Dispense:  30 tablet     Refill:  0   • busPIRone (BUSPAR) 5 MG tablet     Sig: Take 1 tablet by mouth 2 (Two) Times a Day.     Dispense:  60 tablet     Refill:  0       Return in about 4 weeks (around 6/10/2021), or if symptoms worsen or fail to improve, for Recheck.       Patient will follow-up in 4 weeks, highly encouraged the patient if she had any questions or concerns to contact the behavioral health [facility] for sooner appointment patient verbalized understanding.      Functional Status: Moderate impairment     Prognosis: Guarded with Ongoing Treatment            This document has been electronically signed by RODNEY Horton  May 13, 2021 10:00 EDT    Part of this note may be an electronic  transcription/translation of spoken language to printed text using the Dragon Dictation System.

## 2021-05-18 ENCOUNTER — TELEPHONE (OUTPATIENT)
Dept: PSYCHIATRY | Facility: CLINIC | Age: 61
End: 2021-05-18

## 2021-05-18 NOTE — TELEPHONE ENCOUNTER
Left voicemail   
Patient states on Saturday night she toke the trazodone and mini press together , Sunday morning patient states she was so sick and dizzy these symptoms lasted almost all day Sunday , pt states she was so sick that she couldn't even drive .    She hasn't taken the Trazodone since Saturday .  
Patient was made aware , she states that she can only try it on the weekend .  
Patients states that the first time she ever toke them   
OR nurse

## 2021-05-21 ENCOUNTER — OFFICE VISIT (OUTPATIENT)
Dept: ORTHOPEDIC SURGERY | Facility: CLINIC | Age: 61
End: 2021-05-21

## 2021-05-21 VITALS
DIASTOLIC BLOOD PRESSURE: 81 MMHG | BODY MASS INDEX: 21.79 KG/M2 | HEIGHT: 63 IN | HEART RATE: 88 BPM | WEIGHT: 123 LBS | SYSTOLIC BLOOD PRESSURE: 160 MMHG

## 2021-05-21 DIAGNOSIS — M75.52 BURSITIS OF LEFT SHOULDER: Primary | ICD-10-CM

## 2021-05-21 DIAGNOSIS — M75.22 BICEPS TENDINITIS OF LEFT UPPER EXTREMITY: ICD-10-CM

## 2021-05-21 DIAGNOSIS — M75.21 BICEPS TENDINITIS OF RIGHT UPPER EXTREMITY: ICD-10-CM

## 2021-05-21 DIAGNOSIS — M75.42 IMPINGEMENT SYNDROME OF LEFT SHOULDER: ICD-10-CM

## 2021-05-21 PROCEDURE — 99213 OFFICE O/P EST LOW 20 MIN: CPT | Performed by: ORTHOPAEDIC SURGERY

## 2021-05-21 PROCEDURE — 20550 NJX 1 TENDON SHEATH/LIGAMENT: CPT | Performed by: ORTHOPAEDIC SURGERY

## 2021-05-21 PROCEDURE — 20610 DRAIN/INJ JOINT/BURSA W/O US: CPT | Performed by: ORTHOPAEDIC SURGERY

## 2021-05-21 RX ORDER — METHYLPREDNISOLONE ACETATE 80 MG/ML
80 INJECTION, SUSPENSION INTRA-ARTICULAR; INTRALESIONAL; INTRAMUSCULAR; SOFT TISSUE
Status: COMPLETED | OUTPATIENT
Start: 2021-05-21 | End: 2021-05-21

## 2021-05-21 RX ORDER — LIDOCAINE HYDROCHLORIDE 10 MG/ML
5 INJECTION, SOLUTION EPIDURAL; INFILTRATION; INTRACAUDAL; PERINEURAL
Status: COMPLETED | OUTPATIENT
Start: 2021-05-21 | End: 2021-05-21

## 2021-05-21 RX ADMIN — LIDOCAINE HYDROCHLORIDE 5 ML: 10 INJECTION, SOLUTION EPIDURAL; INFILTRATION; INTRACAUDAL; PERINEURAL at 12:19

## 2021-05-21 RX ADMIN — LIDOCAINE HYDROCHLORIDE 5 ML: 10 INJECTION, SOLUTION EPIDURAL; INFILTRATION; INTRACAUDAL; PERINEURAL at 12:18

## 2021-05-21 RX ADMIN — METHYLPREDNISOLONE ACETATE 80 MG: 80 INJECTION, SUSPENSION INTRA-ARTICULAR; INTRALESIONAL; INTRAMUSCULAR; SOFT TISSUE at 12:18

## 2021-05-21 RX ADMIN — METHYLPREDNISOLONE ACETATE 80 MG: 80 INJECTION, SUSPENSION INTRA-ARTICULAR; INTRALESIONAL; INTRAMUSCULAR; SOFT TISSUE at 12:19

## 2021-05-21 NOTE — PROGRESS NOTES
Procedure   Large Joint Arthrocentesis: L subacromial bursa  Date/Time: 5/21/2021 12:18 PM  Consent given by: patient  Site marked: site marked  Timeout: Immediately prior to procedure a time out was called to verify the correct patient, procedure, equipment, support staff and site/side marked as required   Supporting Documentation  Indications: pain   Procedure Details  Location: shoulder - L subacromial bursa  Preparation: Patient was prepped and draped in the usual sterile fashion  Needle size: 23 G  Approach: posterior  Medications administered: 5 mL lidocaine PF 1% 1 %; 80 mg methylPREDNISolone acetate 80 MG/ML  Patient tolerance: patient tolerated the procedure well with no immediate complications    Medium Joint Arthrocentesis  Date/Time: 5/21/2021 12:19 PM  Consent given by: patient  Site marked: site marked  Timeout: Immediately prior to procedure a time out was called to verify the correct patient, procedure, equipment, support staff and site/side marked as required   Supporting Documentation  Indications: pain   Procedure Details  Location: shoulder (Right Bicep) -   Preparation: Patient was prepped and draped in the usual sterile fashion  Needle size: 23 G  Approach: anterolateral  Medications administered: 5 mL lidocaine PF 1% 1 %; 80 mg methylPREDNISolone acetate 80 MG/ML  Patient tolerance: patient tolerated the procedure well with no immediate complications

## 2021-05-21 NOTE — PROGRESS NOTES
[facility] [place] [facility] Follow Up       Office Follow Up Visit       Patient Name: Yeni Olivo    Chief Complaint:   Chief Complaint   Patient presents with   • Follow-up     3 months follow up Biceps tendinitis of both upper extremity       Referring Physician: No ref. provider found    History of Present Illness:   It has been 3  month(s) since Yeni Olivo's last visit. Yeni Olivo returns to clinic today for F/U: follow-up of bilateralBody Part: shoulderReason: pain. The issue has been ongoing for 15 year(s). Yeni Olivo rates HIS/HER: herpain at 6/10 on the pain scale. Previous/current treatments: NSAIDS and physical therapy. Current symptoms:Symptoms: same as prior visit. The pain is worse with sleeping, working and lying on affected side; resting improves the pain. Overall, he/she: sheis doing better.  I have reviewed the patient's history of present illness as noted/entered above.    I have reviewed the patient's past medical history, surgical history, social history, family history, medications, and allergies as noted in the electronic medical record and as noted/entered.  I have reviewed the patient's review of systems as noted/enter and updated as noted in the patient's HPI.    Chronic bilateral shoulder pain left is more subacromial right is more biceps tendon sheath    Does note some improvements she did have injections 3 months ago which helped    She is moving back home to [place] and working remotely      Subjective   Subjective      Review of Systems   Constitutional: Negative.  Negative for chills, fatigue and fever.   HENT: Negative.  Negative for congestion and dental problem.    Eyes: Negative.  Negative for blurred vision.   Respiratory: Negative.  Negative for shortness of breath.    Cardiovascular: Negative.  Negative for leg swelling.   Gastrointestinal: Negative.  Negative for abdominal  pain.   Endocrine: Negative.  Negative for polyuria.   Genitourinary: Negative.  Negative for difficulty urinating.   Musculoskeletal: Positive for arthralgias.   Skin: Negative.    Allergic/Immunologic: Negative.    Neurological: Negative.    Hematological: Negative.  Negative for adenopathy.   Psychiatric/Behavioral: Negative.  Negative for behavioral problems.        Past Medical History:   Past Medical History:   Diagnosis Date   • Arthritis    • Breast cancer (CMS/HCC)     2006 LEFT    • Drug therapy    • Radiation        Past Surgical History:   Past Surgical History:   Procedure Laterality Date   • BREAST BIOPSY     •  SECTION     • HYSTERECTOMY     • MASTECTOMY      BILATERAL   • OTHER SURGICAL HISTORY  2018    stones removed from submandibular gland        Family History:   Family History   Problem Relation Age of Onset   • COPD Mother    • Diabetes Mother    • No Known Problems Father    • Multiple sclerosis Sister    • No Known Problems Brother    • ADD / ADHD Daughter    • Drug abuse Daughter    • Bipolar disorder Daughter    • No Known Problems Son    • Stroke Maternal Grandmother    • No Known Problems Paternal Grandmother    • No Known Problems Maternal Aunt    • No Known Problems Paternal Aunt        Social History:   Social History     Socioeconomic History   • Marital status:      Spouse name: Not on file   • Number of children: 2   • Years of education: Not on file   • Highest education level: Not on file   Tobacco Use   • Smoking status: Current Every Day Smoker     Packs/day: 0.25     Years: 40.00     Pack years: 10.00     Types: Cigarettes   • Smokeless tobacco: Never Used   Vaping Use   • Vaping Use: Never used   Substance and Sexual Activity   • Alcohol use: Not Currently   • Drug use: Not Currently     Types: Marijuana   • Sexual activity: Defer       Medications:   Current Outpatient Medications:   •  acetaminophen (TYLENOL) 500 MG tablet, Take 500 mg by mouth Every 6  "(Six) Hours As Needed for Mild Pain . Pt states she takes 2 in the AM & 1 in the PM, Disp: , Rfl:   •  acyclovir (ZOVIRAX) 400 MG tablet, Take 1 tablet by mouth 2 (two) times a day., Disp: 180 tablet, Rfl: 1  •  ALPRAZolam (XANAX) 0.25 MG tablet, Take 1 tablet by mouth At Night As Needed for Anxiety., Disp: 30 tablet, Rfl: 0  •  busPIRone (BUSPAR) 5 MG tablet, Take 1 tablet by mouth 2 (Two) Times a Day., Disp: 60 tablet, Rfl: 0  •  Ibuprofen 200 MG capsule, Take  by mouth. Pt states she takes 3 in the AM & 2 in the PM, Disp: , Rfl:   •  prazosin (MINIPRESS) 1 MG capsule, Take 1 capsule by mouth Every Night., Disp: 30 capsule, Rfl: 0  •  propranolol (INDERAL) 10 MG tablet, Take 1 tablet by mouth 3 (Three) Times a Day As Needed (anxiety/panic)., Disp: 90 tablet, Rfl: 0  •  traZODone (DESYREL) 50 MG tablet, Take 1-2 tablets by mouth Every Night., Disp: 60 tablet, Rfl: 0  •  venlafaxine XR (EFFEXOR-XR) 150 MG 24 hr capsule, Take 1 capsule by mouth Daily., Disp: 90 capsule, Rfl: 0    Allergies:   Allergies   Allergen Reactions   • Penicillins Anaphylaxis   • Codeine Nausea Only   • Reglan [Metoclopramide] Unknown (See Comments)     neurological   • Talwin [Pentazocine] Other (See Comments)     Oral swelling.        The following portions of the patient's history were reviewed and updated as appropriate: allergies, current medications, past family history, past medical history, past social history, past surgical history and problem list.        Objective    Objective      Vital Signs:   Vitals:    05/21/21 1144   BP: 160/81   Pulse: 88   Weight: 55.8 kg (123 lb)   Height: 160 cm (62.99\")       Ortho Exam:  Left shoulder subacromial impingement syndrome with more proximal pain less biceps pain  Good strength    Right shoulder more anterior biceps tendon sheath pain mild impingement syndrome positive upper cut    Results Review:  Imaging Results (Last 24 Hours)     ** No results found for the last 24 hours. **        "     Procedures    LEFT SHOULDER SUBACROMIAL SPACE INJECTION: Risks and benefits of a shoulder subacromial space injection were discussed and the patient desired to proceed. Verbal consent was obtained. The patient understood the risk of infection, potential skin changes, bump in blood glucose especially with diabetes, nerve injury, possibility of increased pain in the short term, and possible incomplete pain relief.  Using sterile technique, the shoulder subacromial space was injected from a posterior approach with 1mL of 80mg/mL Depo Medrol and 4cc of lidocaine with aspiration prior to injection. The patient tolerated the procedure without difficulty.  CPT CODE 08826 for major joint aspiration/injection    RIGHT SHOULDER BICEPS TENDON SHEATH INJECTION: Risks and benefits of a shoulder biceps tendon sheath injection were discussed and the patient desired to proceed. Verbal consent was obtained. The patient understood the risk of infection, potential skin changes, bump in blood glucose especially with diabetes, nerve injury, possibility of increased pain in the short term, and possible incomplete pain relief. Using sterile technique, the shoulder biceps tendon sheath was injected from an anterior approach with 80mg/mL of Depo Medrol and 4cc of lidocaine with aspiration prior to injection. The patient tolerated the procedure without difficulty. CPT CODE 49867 for tendon sheath injection          Assessment / Plan      Assessment/Plan:   Problem List Items Addressed This Visit        Musculoskeletal and Injuries    Bursitis of left shoulder - Primary    Impingement syndrome of left shoulder    Biceps tendinitis of left upper extremity    Biceps tendinitis of right upper extremity          Left shoulder impingement syndrome, right shoulder biceps tendinitis    Counseled on conservative course we reviewed her MRIs at prior visit.  She desired proceed with injections today both shoulders.    Follow Up: As needed       Benedict Price MD, FAAOS  Orthopedic Surgeon  Fellowship Trained Shoulder and Elbow Surgeon  [facility]  Orthopedics and Sports Medicine  [address], [address]  [place] [address]    05/21/21  12:48 EDT    Please note that portions of this note may have been completed with a voice recognition program. Efforts were made to edit the dictations, but occasionally words are mistranscribed.

## 2021-05-21 NOTE — PROGRESS NOTES
Procedure   Medium Joint Arthrocentesis  Date/Time: 5/21/2021 12:07 PM  Consent given by: patient  Site marked: site marked  Timeout: Immediately prior to procedure a time out was called to verify the correct patient, procedure, equipment, support staff and site/side marked as required   Supporting Documentation  Indications: pain   Procedure Details  Location: shoulder (Right Bicep) -   Preparation: Patient was prepped and draped in the usual sterile fashion  Needle size: 23 G  Approach: anterolateral  Medications administered: 5 mL lidocaine PF 1% 1 %; 80 mg methylPREDNISolone acetate 80 MG/ML  Patient tolerance: patient tolerated the procedure well with no immediate complications    Medium Joint Arthrocentesis  Date/Time: 5/21/2021 12:08 PM  Consent given by: patient  Site marked: site marked  Timeout: Immediately prior to procedure a time out was called to verify the correct patient, procedure, equipment, support staff and site/side marked as required   Supporting Documentation  Indications: pain   Procedure Details  Location: shoulder (Left Bicep) -   Preparation: Patient was prepped and draped in the usual sterile fashion  Needle size: 23 G  Approach: anterolateral  Medications administered: 5 mL lidocaine PF 1% 1 %; 80 mg methylPREDNISolone acetate 80 MG/ML  Patient tolerance: patient tolerated the procedure well with no immediate complications

## 2021-07-07 DIAGNOSIS — R91.1 LUNG NODULE: Primary | ICD-10-CM

## 2021-07-27 ENCOUNTER — TELEPHONE (OUTPATIENT)
Dept: CARDIAC SURGERY | Facility: CLINIC | Age: 61
End: 2021-07-27

## 2021-07-27 NOTE — TELEPHONE ENCOUNTER
I attempted to call MsAsa Pallavi to discuss the CT chest she needs prior to her follow-up with Dr. Dasilva in September, but got no answer. I have mailed her a letter asking her to call central scheduling at her convenience to schedule her CT scan any time before her appointment with Dr. Dasilva.

## 2021-09-22 ENCOUNTER — HOSPITAL ENCOUNTER (OUTPATIENT)
Dept: CT IMAGING | Facility: HOSPITAL | Age: 61
Discharge: HOME | End: 2021-09-22
Admitting: NURSE PRACTITIONER

## 2021-09-22 ENCOUNTER — OFFICE VISIT (OUTPATIENT)
Dept: CARDIAC SURGERY | Facility: CLINIC | Age: 61
End: 2021-09-22

## 2021-09-22 ENCOUNTER — OFFICE VISIT (OUTPATIENT)
Dept: ORTHOPEDIC SURGERY | Facility: CLINIC | Age: 61
End: 2021-09-22

## 2021-09-22 VITALS
WEIGHT: 137.35 LBS | SYSTOLIC BLOOD PRESSURE: 134 MMHG | BODY MASS INDEX: 24.34 KG/M2 | HEART RATE: 96 BPM | HEIGHT: 63 IN | DIASTOLIC BLOOD PRESSURE: 67 MMHG

## 2021-09-22 VITALS
SYSTOLIC BLOOD PRESSURE: 124 MMHG | BODY MASS INDEX: 24.34 KG/M2 | OXYGEN SATURATION: 99 % | HEIGHT: 63 IN | HEART RATE: 91 BPM | WEIGHT: 137.4 LBS | DIASTOLIC BLOOD PRESSURE: 73 MMHG | TEMPERATURE: 98.4 F

## 2021-09-22 DIAGNOSIS — Z71.6 TOBACCO ABUSE COUNSELING: ICD-10-CM

## 2021-09-22 DIAGNOSIS — R91.1 LUNG NODULE: ICD-10-CM

## 2021-09-22 DIAGNOSIS — C50.412 MALIGNANT NEOPLASM OF UPPER-OUTER QUADRANT OF LEFT BREAST IN FEMALE, ESTROGEN RECEPTOR POSITIVE (HCC): ICD-10-CM

## 2021-09-22 DIAGNOSIS — M75.52 BURSITIS OF LEFT SHOULDER: Primary | ICD-10-CM

## 2021-09-22 DIAGNOSIS — M75.51 BURSITIS OF RIGHT SHOULDER: ICD-10-CM

## 2021-09-22 DIAGNOSIS — R91.1 LUNG NODULE: Primary | ICD-10-CM

## 2021-09-22 DIAGNOSIS — Z17.0 MALIGNANT NEOPLASM OF UPPER-OUTER QUADRANT OF LEFT BREAST IN FEMALE, ESTROGEN RECEPTOR POSITIVE (HCC): ICD-10-CM

## 2021-09-22 LAB — CREAT BLDA-MCNC: 0.7 MG/DL (ref 0.6–1.3)

## 2021-09-22 PROCEDURE — 25010000002 IOPAMIDOL 61 % SOLUTION: Performed by: NURSE PRACTITIONER

## 2021-09-22 PROCEDURE — 99212 OFFICE O/P EST SF 10 MIN: CPT | Performed by: THORACIC SURGERY (CARDIOTHORACIC VASCULAR SURGERY)

## 2021-09-22 PROCEDURE — 20610 DRAIN/INJ JOINT/BURSA W/O US: CPT | Performed by: ORTHOPAEDIC SURGERY

## 2021-09-22 PROCEDURE — 82565 ASSAY OF CREATININE: CPT

## 2021-09-22 PROCEDURE — 71270 CT THORAX DX C-/C+: CPT

## 2021-09-22 RX ADMIN — TRIAMCINOLONE ACETONIDE 40 MG: 40 INJECTION, SUSPENSION INTRA-ARTICULAR; INTRAMUSCULAR at 13:35

## 2021-09-22 RX ADMIN — ROPIVACAINE HYDROCHLORIDE 4 ML: 5 INJECTION, SOLUTION EPIDURAL; INFILTRATION; PERINEURAL at 13:35

## 2021-09-22 RX ADMIN — IOPAMIDOL 70 ML: 612 INJECTION, SOLUTION INTRAVENOUS at 10:30

## 2021-09-22 NOTE — PROGRESS NOTES
Procedure   Large Joint Arthrocentesis: R subacromial bursa  Date/Time: 9/22/2021 1:35 PM  Consent given by: patient  Site marked: site marked  Timeout: Immediately prior to procedure a time out was called to verify the correct patient, procedure, equipment, support staff and site/side marked as required   Procedure Details  Location: shoulder - R subacromial bursa  Preparation: Patient was prepped and draped in the usual sterile fashion  Needle size: 22 G  Approach: posterior  Medications administered: 4 mL ropivacaine 0.5 %; 40 mg triamcinolone acetonide 40 MG/ML  Patient tolerance: patient tolerated the procedure well with no immediate complications    Large Joint Arthrocentesis: L subacromial bursa  Date/Time: 9/22/2021 1:35 PM  Consent given by: patient  Site marked: site marked  Timeout: Immediately prior to procedure a time out was called to verify the correct patient, procedure, equipment, support staff and site/side marked as required   Supporting Documentation  Indications: pain   Procedure Details  Location: shoulder - L subacromial bursa  Preparation: Patient was prepped and draped in the usual sterile fashion  Needle size: 22 G  Approach: posterior  Medications administered: 4 mL ropivacaine 0.5 %; 40 mg triamcinolone acetonide 40 MG/ML  Patient tolerance: patient tolerated the procedure well with no immediate complications

## 2021-09-22 NOTE — PROGRESS NOTES
[facility] [place] [facility] Note    Subjective     Chief Complaint   Patient presents with   • Follow-up     Rotator cuff syndrome of both shoulders; Dr. Price injected Left SA and Right Bicep 2021        HPI    Yeni Olivo is a 61 y.o. female who follows up for bilateral shoulder pain.    She follows up today for both of her shoulders, and would like to have injections today.  She is recently had some injections with Dr. Price, but this was a convenient day for her to come back in for injections today.  They have worked well in the past.    I have reviewed the following portions of the patient's history and agree with: History of Present Illness and Review of Systems    Patient Active Problem List   Diagnosis   • CTS (carpal tunnel syndrome)   • DDD (degenerative disc disease), lumbar   • HNP (herniated nucleus pulposus), lumbar   • Anxiety and depression   • Tobacco abuse counseling   • Current every day smoker   • Lumbar facet arthropathy   • Lumbar disc disease with radiculopathy   • Lumbar foraminal stenosis   • Bursitis of left shoulder   • Impingement syndrome of left shoulder   • Biceps tendinitis of left upper extremity   • Nontraumatic incomplete tear of right rotator cuff   • Bursitis of right shoulder   • Biceps tendinitis of right upper extremity     Past Medical History:   Diagnosis Date   • Arthritis    • Breast cancer (CMS/[facility], [facility])     2006 LEFT    • Drug therapy    • Radiation       Past Surgical History:   Procedure Laterality Date   • BREAST BIOPSY     •  SECTION     • HYSTERECTOMY     • MASTECTOMY      BILATERAL   • OTHER SURGICAL HISTORY  2018    stones removed from submandibular gland       Family History   Problem Relation Age of Onset   • COPD Mother    • Diabetes Mother    • No Known Problems Father    • Multiple sclerosis Sister    • No Known Problems Brother    • ADD / ADHD Daughter    • Drug abuse Daughter    • Bipolar disorder Daughter    • No Known  Problems Son    • Stroke Maternal Grandmother    • No Known Problems Paternal Grandmother    • No Known Problems Maternal Aunt    • No Known Problems Paternal Aunt      Social History     Socioeconomic History   • Marital status:      Spouse name: Not on file   • Number of children: 2   • Years of education: Not on file   • Highest education level: Not on file   Tobacco Use   • Smoking status: Former Smoker     Packs/day: 0.25     Years: 40.00     Pack years: 10.00     Types: Cigarettes     Quit date: 2021     Years since quittin.5   • Smokeless tobacco: Never Used   Vaping Use   • Vaping Use: Some days   Substance and Sexual Activity   • Alcohol use: Not Currently   • Drug use: Not Currently     Types: Marijuana   • Sexual activity: Defer      Current Outpatient Medications on File Prior to Visit   Medication Sig Dispense Refill   • acetaminophen (TYLENOL) 500 MG tablet Take 500 mg by mouth Every 6 (Six) Hours As Needed for Mild Pain . Pt states she takes 2 in the AM & 1 in the PM     • acyclovir (ZOVIRAX) 400 MG tablet Take 1 tablet by mouth 2 (two) times a day. 180 tablet 1   • ALPRAZolam (XANAX) 0.25 MG tablet Take 1 tablet by mouth At Night As Needed for Anxiety. 30 tablet 0   • busPIRone (BUSPAR) 5 MG tablet Take 1 tablet by mouth 2 (Two) Times a Day. 60 tablet 0   • Ibuprofen 200 MG capsule Take  by mouth. Pt states she takes 3 in the AM & 2 in the PM     • prazosin (MINIPRESS) 1 MG capsule Take 1 capsule by mouth Every Night. 30 capsule 0   • propranolol (INDERAL) 10 MG tablet Take 1 tablet by mouth 3 (Three) Times a Day As Needed (anxiety/panic). 90 tablet 0   • traZODone (DESYREL) 50 MG tablet Take 1-2 tablets by mouth Every Night. 60 tablet 0   • venlafaxine XR (EFFEXOR-XR) 150 MG 24 hr capsule Take 1 capsule by mouth Daily. 90 capsule 0     Current Facility-Administered Medications on File Prior to Visit   Medication Dose Route Frequency Provider Last Rate Last Admin   • [COMPLETED]  iopamidol (ISOVUE-300) 61 % injection 100 mL  100 mL Intravenous Once in imaging Marimar Da Silva APRN   70 mL at 09/22/21 1030      Allergies   Allergen Reactions   • Penicillins Anaphylaxis   • Codeine Nausea Only   • Reglan [Metoclopramide] Unknown (See Comments)     neurological   • Talwin [Pentazocine] Other (See Comments)     Oral swelling.         Review of Systems   Constitutional: Negative for activity change, appetite change, chills, diaphoresis, fatigue, fever and unexpected weight change.   HENT: Negative for congestion, dental problem, drooling, ear discharge, ear pain, facial swelling, hearing loss, mouth sores, nosebleeds, postnasal drip, rhinorrhea, sinus pressure, sneezing, sore throat, tinnitus, trouble swallowing and voice change.    Eyes: Negative for photophobia, pain, discharge, redness, itching and visual disturbance.   Respiratory: Negative for apnea, cough, choking, chest tightness, shortness of breath, wheezing and stridor.    Cardiovascular: Negative for chest pain, palpitations and leg swelling.   Gastrointestinal: Negative for abdominal distention, abdominal pain, anal bleeding, blood in stool, constipation, diarrhea, nausea, rectal pain and vomiting.   Endocrine: Negative for cold intolerance, heat intolerance, polydipsia, polyphagia and polyuria.   Genitourinary: Negative for decreased urine volume, difficulty urinating, dysuria, enuresis, flank pain, frequency, genital sores, hematuria and urgency.   Musculoskeletal: Positive for arthralgias. Negative for back pain, gait problem, joint swelling, myalgias, neck pain and neck stiffness.   Skin: Negative for color change, pallor, rash and wound.   Allergic/Immunologic: Negative for environmental allergies, food allergies and immunocompromised state.   Neurological: Negative for dizziness, tremors, seizures, syncope, facial asymmetry, speech difficulty, weakness, light-headedness, numbness and headaches.   Hematological: Negative for  "adenopathy. Does not bruise/bleed easily.   Psychiatric/Behavioral: Negative for agitation, behavioral problems, confusion, decreased concentration, dysphoric mood, hallucinations, self-injury, sleep disturbance and suicidal ideas. The patient is not nervous/anxious and is not hyperactive.         Objective      Physical Exam  /67   Pulse 96   Ht 160 cm (62.99\")   Wt 62.3 kg (137 lb 5.6 oz)   BMI 24.34 kg/m²     Body mass index is 24.34 kg/m².    General:   Mental Status:  Alert   Appearance: Cooperative, in no acute distress   Build and Nutrition: Well-nourished well-developed female   Orientation: Alert and oriented to person, place and time   Posture: Normal   Gait: Normal    Integument:  • Right shoulder: No skin lesions, rash, or ecchymosis.  • Left shoulder: No skin lesions, rash, or ecchymosis.    Upper Extremities  • Right Shoulder:  • Tenderness: Tenderness in the subacromial region  • Swelling: None  • Crepitus: None  • Range of motion:  • External rotation: 70°  • Forward flexion: 170°  • Abduction: 170°  • Deformities: None  • Functional Testing:  • Drop Arm: Negative  • Lift off: Negative.  • Impingement: Negative    • Left Shoulder:  • Tenderness: Tenderness in the subacromial region  • Effusion: None  • Swelling: None  • Crepitus: None  • Atrophy: None  • Range of motion:  • Forward flexion: 170°  • Abduction: 170°  • External rotation: 70°  • Deformities: None  • Functional testing:  • Drop Arm: Negative  • Lift off: Negative  • Impingement: Positive    Imaging/Studies  Imaging Results (Last 24 Hours)     ** No results found for the last 24 hours. **            Assessment and Plan     Diagnoses and all orders for this visit:    1. Bursitis of left shoulder (Primary)  -     Large Joint Arthrocentesis: L subacromial bursa    2. Bursitis of right shoulder  -     Large Joint Arthrocentesis: R subacromial bursa        1. Bursitis of left shoulder    2. Bursitis of right shoulder  "       Plan  Reviewed my findings with the patient today.  She would like to proceed with repeat injections today, and these were provided.  She will follow-up as needed.    Procedure Note:   The potential benefits of performing therapeutic bilateral shoulder subacromial bursal injections, as well as potential risks (including, but not limited to infection, swelling, pain, bleeding, bruising, nerve/blood vessel damage, skin color changes, transient elevation in blood glucose levels, and fat atrophy) were discussed with the patient.  After informed consent was obtained, a timeout procedure was performed, and the skin on the right and left shoulders was prepped with chlorhexidine soap and alcohol, after which ethyl chloride was applied to the skin at the injection sites. Via the posterior approach, 1 ml of Kenalog 40 mg/ml mixed with 4 ml 0.5% ropivacaine plain was injected into the right subacromial bursa, and  1 ml of Kenalog 40 mg/ml mixed with 4 ml 0.5% ropivacaine plain was injected into the left subacromial bursa. The patient tolerated the procedures well, experiencing 98% improvement in the bilateral shoulders. There were no complications.  Band-Aid's were applied to the injection sites. Post-procedural instructions were given to the patient and/or their caregiver.      Return if symptoms worsen or fail to improve.      Transcribed from ambient dictation for Reginaldo Virk MD by Dwayne Ruff.  09/22/21   15:56 EDT    I have personally performed the services described in this document as transcribed by the above individual, and it is both accurate and complete.  Reginaldo Virk MD  9/23/2021  05:56 EDT

## 2021-09-23 RX ORDER — ROPIVACAINE HYDROCHLORIDE 5 MG/ML
4 INJECTION, SOLUTION EPIDURAL; INFILTRATION; PERINEURAL
Status: COMPLETED | OUTPATIENT
Start: 2021-09-22 | End: 2021-09-22

## 2021-09-23 RX ORDER — TRIAMCINOLONE ACETONIDE 40 MG/ML
40 INJECTION, SUSPENSION INTRA-ARTICULAR; INTRAMUSCULAR
Status: COMPLETED | OUTPATIENT
Start: 2021-09-22 | End: 2021-09-22

## 2021-09-26 NOTE — PROGRESS NOTES
"Patient Information  Yeni Olivo                                                                                          [address] [place] [address]      1960  [unfilled]  [unfilled]    Chief Complaint   Patient presents with   • Lung Nodule     6 month follow-up with results from CT chest to evaluate left lung nodule        History of Present Illness: Patient seen for follow-up of the left upper lobe lung nodule that was discovered in the recent past on CT screening test for a 40-pack-year history of smoking.  Patient has a history of prior breast cancer resection by Dr. Quan Waldrop and preop chemotherapy with postop radiation treatments.  Patient was a continued smoker up to the time that we saw her for this lung nodule.  She has since stop smoking.  The patient was presented at the tumor board conference and the needle biopsy was requested.  However, Dr. Massey the interventional radiologist felt as though this lesion was too small to biopsy and was follow-up was indicated.  Patient had a follow-up CT scan of the chest and she comes back today for follow-up.  Vitals:    09/22/21 1049   BP: 124/73   BP Location: Right arm   Patient Position: Sitting   Pulse: 91   Temp: 98.4 °F (36.9 °C)   SpO2: 99%   Weight: 62.3 kg (137 lb 6.4 oz)   Height: 160 cm (63\")        Physical Exam     Lab/other results: CT scan of September 22, 2021 showed decrease in this left upper lobe lung nodule and no suspicious pulmonary nodules were identified.  The reading radiologist felt as though that this area in question was nodular scarring along the pleural margin left lung apex with decreased in size.    Assessment: #1.  Left upper lobe lung nodule pleural-based decreasing in size over 6 months follow-up.  Interventional radiologist Dr. Massey in February 2021 feels as though that this lesion was too small to biopsy.    Plan: We will continue to follow the patient and see her now on a yearly follow-up CT scan of " the chest.    Jose Francisco Dasilva M.D.

## 2022-02-08 ENCOUNTER — TELEPHONE (OUTPATIENT)
Dept: ORTHOPEDIC SURGERY | Facility: CLINIC | Age: 62
End: 2022-02-08

## 2022-02-08 NOTE — TELEPHONE ENCOUNTER
Caller: JOSE DARBY     Relationship to patient: SELF     Best call back number: [phone]    Type of visit: SHOULDER INJECTION     Requested date: 02/18/22- WILL BE IN TOWN THIS DAY

## 2022-02-17 ENCOUNTER — OFFICE VISIT (OUTPATIENT)
Dept: ORTHOPEDIC SURGERY | Facility: CLINIC | Age: 62
End: 2022-02-17

## 2022-02-17 VITALS — BODY MASS INDEX: 26.05 KG/M2 | WEIGHT: 147 LBS | HEIGHT: 63 IN

## 2022-02-17 DIAGNOSIS — M75.52 BURSITIS OF LEFT SHOULDER: ICD-10-CM

## 2022-02-17 DIAGNOSIS — M75.41 IMPINGEMENT SYNDROME OF BOTH SHOULDERS: ICD-10-CM

## 2022-02-17 DIAGNOSIS — M75.21 BICEPS TENDINITIS OF RIGHT UPPER EXTREMITY: ICD-10-CM

## 2022-02-17 DIAGNOSIS — M75.111 NONTRAUMATIC INCOMPLETE TEAR OF RIGHT ROTATOR CUFF: ICD-10-CM

## 2022-02-17 DIAGNOSIS — M75.51 BURSITIS OF RIGHT SHOULDER: ICD-10-CM

## 2022-02-17 DIAGNOSIS — M75.22 BICEPS TENDINITIS OF LEFT UPPER EXTREMITY: Primary | ICD-10-CM

## 2022-02-17 DIAGNOSIS — M75.42 IMPINGEMENT SYNDROME OF LEFT SHOULDER: ICD-10-CM

## 2022-02-17 DIAGNOSIS — M75.42 IMPINGEMENT SYNDROME OF BOTH SHOULDERS: ICD-10-CM

## 2022-02-17 PROCEDURE — 20610 DRAIN/INJ JOINT/BURSA W/O US: CPT | Performed by: ORTHOPAEDIC SURGERY

## 2022-02-17 PROCEDURE — 20550 NJX 1 TENDON SHEATH/LIGAMENT: CPT | Performed by: ORTHOPAEDIC SURGERY

## 2022-02-17 RX ORDER — TRIAMCINOLONE ACETONIDE 40 MG/ML
40 INJECTION, SUSPENSION INTRA-ARTICULAR; INTRAMUSCULAR
Status: COMPLETED | OUTPATIENT
Start: 2022-02-17 | End: 2022-02-17

## 2022-02-17 RX ORDER — LIDOCAINE HYDROCHLORIDE 10 MG/ML
5 INJECTION, SOLUTION EPIDURAL; INFILTRATION; INTRACAUDAL; PERINEURAL
Status: COMPLETED | OUTPATIENT
Start: 2022-02-17 | End: 2022-02-17

## 2022-02-17 RX ADMIN — LIDOCAINE HYDROCHLORIDE 5 ML: 10 INJECTION, SOLUTION EPIDURAL; INFILTRATION; INTRACAUDAL; PERINEURAL at 15:17

## 2022-02-17 RX ADMIN — TRIAMCINOLONE ACETONIDE 40 MG: 40 INJECTION, SUSPENSION INTRA-ARTICULAR; INTRAMUSCULAR at 15:17

## 2022-02-17 RX ADMIN — LIDOCAINE HYDROCHLORIDE 5 ML: 10 INJECTION, SOLUTION EPIDURAL; INFILTRATION; INTRACAUDAL; PERINEURAL at 15:18

## 2022-02-17 RX ADMIN — TRIAMCINOLONE ACETONIDE 40 MG: 40 INJECTION, SUSPENSION INTRA-ARTICULAR; INTRAMUSCULAR at 15:18

## 2022-02-17 NOTE — PROGRESS NOTES
[facility] Orthopaedic Surgery Office Follow Up       Office Follow Up Visit       Patient Name: Yeni Olivo    Chief Complaint:   Chief Complaint   Patient presents with   • Follow-up     Bilateral Shoulder Pain, last cortisone injections 9/22/21 [place]        Referring Physician: No ref. provider found    History of Present Illness:   It has been 9  month(s) since Yeni Olivo's last visit. Yeni Olivo returns to clinic today for F/U: follow-up of bilateralBody Part: shoulderReason: pain. The issue has been ongoing for 15 year(s). Yeni Olivo rates HIS/HER: herpain at 6/10 on the pain scale. Previous/current treatments: NSAIDS and physical therapy, cortisone injections sub acromial bilateral 9/22/21. Current symptoms:Symptoms: pain, popping, grinding and same as prior visit. The pain is worse with any movement of the joint; Nothing improves the pain. Overall, he/she: sheis doing worse.  I have reviewed the patient's history of present illness as noted/entered above.    I have reviewed the patient's past medical history, surgical history, social history, family history, medications, and allergies as noted in the electronic medical record and as noted/entered.  I have reviewed the patient's review of systems as noted/enter and updated as noted in the patient's HPI.    Left biceps tendon sheath and perhaps some AC joint pain persists on the left left biceps more so than right shoulder left shoulder much worse than right    Right subacromial pain.    Prior injections helped      Subjective   Subjective      Review of Systems   Musculoskeletal: Positive for arthralgias.   All other systems reviewed and are negative.       Past Medical History:   Past Medical History:   Diagnosis Date   • Arthritis    • Breast cancer (HCC)     2006 LEFT    • Drug therapy    • Radiation        Past Surgical History:   Past Surgical History:    Procedure Laterality Date   • BREAST BIOPSY     •  SECTION     • HYSTERECTOMY     • MASTECTOMY      BILATERAL   • OTHER SURGICAL HISTORY  2018    stones removed from submandibular gland        Family History:   Family History   Problem Relation Age of Onset   • COPD Mother    • Diabetes Mother    • No Known Problems Father    • Multiple sclerosis Sister    • No Known Problems Brother    • ADD / ADHD Daughter    • Drug abuse Daughter    • Bipolar disorder Daughter    • No Known Problems Son    • Stroke Maternal Grandmother    • No Known Problems Paternal Grandmother    • No Known Problems Maternal Aunt    • No Known Problems Paternal Aunt        Social History:   Social History     Socioeconomic History   • Marital status:    • Number of children: 2   Tobacco Use   • Smoking status: Former Smoker     Packs/day: 0.25     Years: 40.00     Pack years: 10.00     Types: Cigarettes     Quit date: 2021     Years since quittin.9   • Smokeless tobacco: Never Used   • Tobacco comment: occasional vape   Vaping Use   • Vaping Use: Some days   Substance and Sexual Activity   • Alcohol use: Not Currently   • Drug use: Not Currently     Types: Marijuana   • Sexual activity: Defer       Medications:   Current Outpatient Medications:   •  acetaminophen (TYLENOL) 500 MG tablet, Take 500 mg by mouth Every 6 (Six) Hours As Needed for Mild Pain . Pt states she takes 2 in the AM & 1 in the PM, Disp: , Rfl:   •  acyclovir (ZOVIRAX) 400 MG tablet, Take 1 tablet by mouth 2 (two) times a day., Disp: 180 tablet, Rfl: 1  •  ALPRAZolam (XANAX) 0.25 MG tablet, Take 1 tablet by mouth At Night As Needed for Anxiety., Disp: 30 tablet, Rfl: 0  •  busPIRone (BUSPAR) 5 MG tablet, Take 1 tablet by mouth 2 (Two) Times a Day., Disp: 60 tablet, Rfl: 0  •  Ibuprofen 200 MG capsule, Take  by mouth. Pt states she takes 3 in the AM & 2 in the PM, Disp: , Rfl:   •  prazosin (MINIPRESS) 1 MG capsule, Take 1 capsule by mouth Every  "Night., Disp: 30 capsule, Rfl: 0  •  propranolol (INDERAL) 10 MG tablet, Take 1 tablet by mouth 3 (Three) Times a Day As Needed (anxiety/panic)., Disp: 90 tablet, Rfl: 0  •  traZODone (DESYREL) 50 MG tablet, Take 1-2 tablets by mouth Every Night., Disp: 60 tablet, Rfl: 0  •  venlafaxine XR (EFFEXOR-XR) 150 MG 24 hr capsule, Take 1 capsule by mouth Daily., Disp: 90 capsule, Rfl: 0    Allergies:   Allergies   Allergen Reactions   • Penicillins Anaphylaxis   • Codeine Nausea Only   • Reglan [Metoclopramide] Unknown (See Comments)     neurological   • Talwin [Pentazocine] Other (See Comments)     Oral swelling.        The following portions of the patient's history were reviewed and updated as appropriate: allergies, current medications, past family history, past medical history, past social history, past surgical history and problem list.        Objective    Objective      Vital Signs:   Vitals:    02/17/22 1502   Weight: 66.7 kg (147 lb)   Height: 160 cm (62.99\")       Ortho Exam:  Left shoulder significant anterior biceps pain some mild AC joint pain which I think is less problematic than the biceps    Right subacromial pain    Results Review:  Imaging Results (Last 24 Hours)     ** No results found for the last 24 hours. **        Procedures    Left SHOULDER BICEPS TENDON SHEATH INJECTION: Risks and benefits of a shoulder biceps tendon sheath injection were discussed and the patient desired to proceed. Verbal consent was obtained. The patient understood the risk of infection, potential skin changes, bump in blood glucose especially with diabetes, nerve injury, possibility of increased pain in the short term, and possible incomplete pain relief. Using sterile technique, the shoulder biceps tendon sheath was injected from an anterior approach with 40 mg triamcinolone acetonide 40 MG/ML and 4cc of lidocaine with aspiration prior to injection. The patient tolerated the procedure without difficulty. CPT CODE 20996 for " tendon sheath injection    RIGHT SHOULDER SUBACROMIAL SPACE INJECTION: Risks and benefits of a shoulder subacromial space injection were discussed and the patient desired to proceed. Verbal consent was obtained. The patient understood the risk of infection, potential skin changes, bump in blood glucose especially with diabetes, nerve injury, possibility of increased pain in the short term, and possible incomplete pain relief.  Using sterile technique, the shoulder subacromial space was injected from a posterior approach with 1mL of 40 mg triamcinolone acetonide 40 MG/ML and 4cc of lidocaine with aspiration prior to injection. The patient tolerated the procedure without difficulty.  CPT CODE 08260 for major joint aspiration/injection          Assessment / Plan      Assessment/Plan:   Problem List Items Addressed This Visit        Musculoskeletal and Injuries    Bursitis of left shoulder    Impingement syndrome of left shoulder    Biceps tendinitis of left upper extremity - Primary    Nontraumatic incomplete tear of right rotator cuff    Bursitis of right shoulder    Biceps tendinitis of right upper extremity    Relevant Orders    Left biceps tendon    Large Joint Arthrocentesis: R subacromial bursa      Other Visit Diagnoses     Impingement syndrome of both shoulders            Patient desires to proceed with additional injections today.  Right subacromial injection left biceps sheath injection.  She desires to avoid any operative intervention.  She understands the risk of repeat injections.    Follow Up: As needed      Benedict Price MD, FAAOS  Orthopedic Surgeon  Fellowship Trained Shoulder and Elbow Surgeon  [facility]  Orthopedics and Sports Medicine  [address] [place] [address], [address]  [place] [address]    02/17/22  16:10 EST

## 2022-02-17 NOTE — PROGRESS NOTES
Procedure   Left biceps tendon  Date/Time: 2/17/2022 3:17 PM  Consent given by: patient  Site marked: site marked  Timeout: Immediately prior to procedure a time out was called to verify the correct patient, procedure, equipment, support staff and site/side marked as required   Supporting Documentation  Indications: pain   Procedure Details  Location: shoulder -   Preparation: Patient was prepped and draped in the usual sterile fashion  Needle size: 22 G  Approach: posterior  Medications administered: 40 mg triamcinolone acetonide 40 MG/ML; 5 mL lidocaine PF 1% 1 %  Patient tolerance: patient tolerated the procedure well with no immediate complications    Large Joint Arthrocentesis: R subacromial bursa  Date/Time: 2/17/2022 3:18 PM  Consent given by: patient  Site marked: site marked  Timeout: Immediately prior to procedure a time out was called to verify the correct patient, procedure, equipment, support staff and site/side marked as required   Supporting Documentation  Indications: pain   Procedure Details  Location: shoulder - R subacromial bursa  Preparation: Patient was prepped and draped in the usual sterile fashion  Needle size: 22 G  Approach: posterior  Medications administered: 5 mL lidocaine PF 1% 1 %; 40 mg triamcinolone acetonide 40 MG/ML  Patient tolerance: patient tolerated the procedure well with no immediate complications

## 2022-07-05 ENCOUNTER — CLINICAL SUPPORT (OUTPATIENT)
Dept: ORTHOPEDIC SURGERY | Facility: CLINIC | Age: 62
End: 2022-07-05

## 2022-07-05 VITALS — WEIGHT: 147 LBS | HEIGHT: 63 IN | BODY MASS INDEX: 26.05 KG/M2

## 2022-07-05 DIAGNOSIS — M75.42 IMPINGEMENT SYNDROME OF BOTH SHOULDERS: ICD-10-CM

## 2022-07-05 DIAGNOSIS — M75.41 IMPINGEMENT SYNDROME OF BOTH SHOULDERS: ICD-10-CM

## 2022-07-05 DIAGNOSIS — M75.51 BURSITIS OF RIGHT SHOULDER: ICD-10-CM

## 2022-07-05 DIAGNOSIS — M75.52 BURSITIS OF LEFT SHOULDER: Primary | ICD-10-CM

## 2022-07-05 DIAGNOSIS — M75.42 IMPINGEMENT SYNDROME OF LEFT SHOULDER: ICD-10-CM

## 2022-07-05 PROCEDURE — 20610 DRAIN/INJ JOINT/BURSA W/O US: CPT | Performed by: ORTHOPAEDIC SURGERY

## 2022-07-05 RX ORDER — TRIAMCINOLONE ACETONIDE 40 MG/ML
40 INJECTION, SUSPENSION INTRA-ARTICULAR; INTRAMUSCULAR
Status: COMPLETED | OUTPATIENT
Start: 2022-07-05 | End: 2022-07-05

## 2022-07-05 RX ORDER — LIDOCAINE HYDROCHLORIDE 10 MG/ML
5 INJECTION, SOLUTION EPIDURAL; INFILTRATION; INTRACAUDAL; PERINEURAL
Status: COMPLETED | OUTPATIENT
Start: 2022-07-05 | End: 2022-07-05

## 2022-07-05 RX ADMIN — LIDOCAINE HYDROCHLORIDE 5 ML: 10 INJECTION, SOLUTION EPIDURAL; INFILTRATION; INTRACAUDAL; PERINEURAL at 12:57

## 2022-07-05 RX ADMIN — TRIAMCINOLONE ACETONIDE 40 MG: 40 INJECTION, SUSPENSION INTRA-ARTICULAR; INTRAMUSCULAR at 12:57

## 2022-07-05 NOTE — PROGRESS NOTES
[facility] [place] [facility] Orth[facility] Surgery Office Visit - Benedict Price MD    Office Visit       Patient Name: Yeni Olivo    Chief Complaint:   Chief Complaint   Patient presents with   • Injections     Bilateral shoulder cortisone injection       Referring Physician: No ref. provider found    History of Present Illness:   Yeni Olivo is a 62 y.o. female who presents with bilateral shoulder pain    Desires bilateral subacromial injections  Recently came back from a cruise and had a great trip    Subjective   Subjective      Review of Systems     Past Medical History:   Past Medical History:   Diagnosis Date   • Arthritis    • Breast cancer (HCC)      LEFT    • Drug therapy    • Radiation        Past Surgical History:   Past Surgical History:   Procedure Laterality Date   • BREAST BIOPSY     •  SECTION     • HYSTERECTOMY     • MASTECTOMY      BILATERAL   • OTHER SURGICAL HISTORY  2018    stones removed from submandibular gland        Family History:   Family History   Problem Relation Age of Onset   • COPD Mother    • Diabetes Mother    • No Known Problems Father    • Multiple sclerosis Sister    • No Known Problems Brother    • ADD / ADHD Daughter    • Drug abuse Daughter    • Bipolar disorder Daughter    • No Known Problems Son    • Stroke Maternal Grandmother    • No Known Problems Paternal Grandmother    • No Known Problems Maternal Aunt    • No Known Problems Paternal Aunt        Social History:   Social History     Socioeconomic History   • Marital status:    • Number of children: 2   Tobacco Use   • Smoking status: Former Smoker     Packs/day: 0.25     Years: 40.00     Pack years: 10.00     Types: Cigarettes     Quit date: 2021     Years since quittin.3   • Smokeless tobacco: Never Used   • Tobacco comment: occasional vape   Vaping Use   • Vaping Use: Some days   Substance and Sexual Activity   • Alcohol use: Not  "Currently   • Drug use: Not Currently     Types: Marijuana   • Sexual activity: Defer       Medications:   Current Outpatient Medications:   •  acetaminophen (TYLENOL) 500 MG tablet, Take 500 mg by mouth Every 6 (Six) Hours As Needed for Mild Pain . Pt states she takes 2 in the AM & 1 in the PM, Disp: , Rfl:   •  acyclovir (ZOVIRAX) 400 MG tablet, Take 1 tablet by mouth 2 (two) times a day., Disp: 180 tablet, Rfl: 1  •  ALPRAZolam (XANAX) 0.25 MG tablet, Take 1 tablet by mouth At Night As Needed for Anxiety., Disp: 30 tablet, Rfl: 0  •  busPIRone (BUSPAR) 5 MG tablet, Take 1 tablet by mouth 2 (Two) Times a Day., Disp: 60 tablet, Rfl: 0  •  Ibuprofen 200 MG capsule, Take  by mouth. Pt states she takes 3 in the AM & 2 in the PM, Disp: , Rfl:   •  prazosin (MINIPRESS) 1 MG capsule, Take 1 capsule by mouth Every Night., Disp: 30 capsule, Rfl: 0  •  propranolol (INDERAL) 10 MG tablet, Take 1 tablet by mouth 3 (Three) Times a Day As Needed (anxiety/panic)., Disp: 90 tablet, Rfl: 0  •  traZODone (DESYREL) 50 MG tablet, Take 1-2 tablets by mouth Every Night., Disp: 60 tablet, Rfl: 0  •  venlafaxine XR (EFFEXOR-XR) 150 MG 24 hr capsule, Take 1 capsule by mouth Daily., Disp: 90 capsule, Rfl: 0    Allergies:   Allergies   Allergen Reactions   • Penicillins Anaphylaxis   • Codeine Nausea Only   • Reglan [Metoclopramide] Unknown (See Comments)     neurological   • Talwin [Pentazocine] Other (See Comments)     Oral swelling.        The following portions of the patient's history were reviewed and updated as appropriate: allergies, current medications, past family history, past medical history, past social history, past surgical history and problem list.        Objective    Objective      Vital Signs:   Vitals:    07/05/22 1238   Weight: 66.7 kg (147 lb)   Height: 160 cm (62.99\")       Ortho Exam:  Bilateral shoulder pain    Results Review:   Imaging Results (Last 24 Hours)     ** No results found for the last 24 hours. **    "       Procedures     BILATERAL SHOULDER SUBACROMIAL SPACE INJECTIONS: Risks and benefits of a shoulder subacromial space injection were discussed and the patient desired to proceed. Verbal consent was obtained. The patient understood the risk of infection, potential skin changes, bump in blood glucose especially with diabetes, nerve injury, possibility of increased pain in the short term, and possible incomplete pain relief.  Using sterile technique, the shoulder subacromial space was injected from a posterior approach with 1mL of 40 mg triamcinolone acetonide 40 MG/ML and 4cc of lidocaine with aspiration prior to injection. The patient tolerated the procedure without difficulty.  CPT CODE 75111 for major joint aspiration/injection --- procedure completed on both shoulders          Assessment / Plan      Assessment/Plan:   Problem List Items Addressed This Visit        Musculoskeletal and Injuries    Bursitis of left shoulder - Primary    Impingement syndrome of left shoulder    Bursitis of right shoulder      Other Visit Diagnoses     Impingement syndrome of both shoulders              Bilateral subacromial injections    Follow Up: Patient will keep me updated pending progress.        Benedict Price MD, FAAOS  Orthopedic Surgeon  Fellowship Trained Shoulder and Elbow Surgeon  [facility]  Orthopedics and Sports Medicine  [address] [place] [address], [address]  [place] [address]    07/05/22  13:51 EDT

## 2022-07-05 NOTE — PROGRESS NOTES
Procedure   - Large Joint Arthrocentesis: bilateral subacromial bursa on 7/5/2022 12:57 PM  Indications: pain  Details: 22 G needle, posterior approach  Medications (Right): 5 mL lidocaine PF 1% 1 %; 40 mg triamcinolone acetonide 40 MG/ML  Medications (Left): 5 mL lidocaine PF 1% 1 %; 40 mg triamcinolone acetonide 40 MG/ML  Outcome: tolerated well, no immediate complications  Procedure, treatment alternatives, risks and benefits explained, specific risks discussed. Consent was given by the patient. Immediately prior to procedure a time out was called to verify the correct patient, procedure, equipment, support staff and site/side marked as required. Patient was prepped and draped in the usual sterile fashion.

## 2022-07-26 DIAGNOSIS — R91.1 LUNG NODULE: Primary | ICD-10-CM

## 2022-09-22 ENCOUNTER — TELEPHONE (OUTPATIENT)
Dept: CARDIAC SURGERY | Facility: CLINIC | Age: 62
End: 2022-09-22

## 2022-09-28 ENCOUNTER — OFFICE VISIT (OUTPATIENT)
Dept: CARDIAC SURGERY | Facility: CLINIC | Age: 62
End: 2022-09-28

## 2022-09-28 ENCOUNTER — HOSPITAL ENCOUNTER (OUTPATIENT)
Dept: CT IMAGING | Facility: HOSPITAL | Age: 62
Discharge: HOME | End: 2022-09-28
Admitting: NURSE PRACTITIONER

## 2022-09-28 VITALS
HEART RATE: 94 BPM | HEIGHT: 63 IN | BODY MASS INDEX: 27.11 KG/M2 | SYSTOLIC BLOOD PRESSURE: 138 MMHG | DIASTOLIC BLOOD PRESSURE: 80 MMHG | TEMPERATURE: 97.5 F | WEIGHT: 153 LBS | OXYGEN SATURATION: 98 %

## 2022-09-28 DIAGNOSIS — Z85.3 H/O MALIGNANT NEOPLASM OF BREAST: ICD-10-CM

## 2022-09-28 DIAGNOSIS — R91.1 LUNG NODULE: Primary | ICD-10-CM

## 2022-09-28 DIAGNOSIS — R91.1 LUNG NODULE: ICD-10-CM

## 2022-09-28 LAB — CREAT BLDA-MCNC: 0.8 MG/DL (ref 0.6–1.3)

## 2022-09-28 PROCEDURE — 99212 OFFICE O/P EST SF 10 MIN: CPT | Performed by: THORACIC SURGERY (CARDIOTHORACIC VASCULAR SURGERY)

## 2022-09-28 PROCEDURE — 71260 CT THORAX DX C+: CPT

## 2022-09-28 PROCEDURE — 25010000002 IOPAMIDOL 61 % SOLUTION: Performed by: NURSE PRACTITIONER

## 2022-09-28 PROCEDURE — 82565 ASSAY OF CREATININE: CPT

## 2022-09-28 RX ADMIN — IOPAMIDOL 90 ML: 612 INJECTION, SOLUTION INTRAVENOUS at 09:50

## 2022-09-29 NOTE — PROGRESS NOTES
"Patient Information  Yeni Olivo                                                                                          [address] [facility] [place] [address]      1960  [unfilled]  [unfilled]    Chief Complaint   Patient presents with   • Follow-up     1 YR FU with CT Chest for Left Lung Nodule       History of Present Illness: Patient seen today for yearly follow-up visit with chest CT scan for left upper lobe lung nodule discovered approximately 2 years ago on a routine screening CT scan test due to 40-pack-year history of smoking.  Pertinent other history includes a prior history of breast cancer with resection by Dr. Quan Waldrop many years ago with both preop chemotherapy and postop radiation treatments..  The patient was presented over a year ago at the tumor board conference with recommendation for consideration of needle biopsies of the left upper lobe lung nodule.  Interventional radiology on reviewing of the previous CAT scans did not think there was a significant solid lesion for biopsy and thought that mainly the CT scan findings represented some pleural scarring.  This CAT scan that was done earlier today of the chest is basically 1 year follow-up from the CAT scan presented at the tumor board a year ago.  Patient has stopped cigarette smoking but is still vaping.    Vitals:    09/28/22 1102   BP: 138/80   BP Location: Left arm   Patient Position: Sitting   Pulse: 94   Temp: 97.5 °F (36.4 °C)   SpO2: 98%   Weight: 69.4 kg (153 lb)   Height: 160 cm (63\")        Physical Exam examination reveals a healthy female no acute distress.    Lab/other results: The present CAT scan report of the radiologist felt as though there is no change in this pleural scarring of the left upper lobe over the past year.  I also reviewed the CAT scan with Dr. Malcom Spaulding of the radiology department at [facility] and on his review and comparison of the present CAT scan with the CAT scan from a year " ago felt as though there was no change in this pleural scarring of the left upper lobe area.    Assessment: #1.  Left upper lobe pleural-based scarring/nodularity unchanged in size over the past year CAT scan follow-up.    Plan: We will plan to see the patient in a1 year follow-up with a CAT scan of the chest prior to that return.  I have encouraged her to try to stop vaping in the interim.    Jose Francisco Dasilva M.D.

## 2022-10-17 ENCOUNTER — TELEPHONE (OUTPATIENT)
Dept: ORTHOPEDIC SURGERY | Facility: CLINIC | Age: 62
End: 2022-10-17

## 2022-10-17 NOTE — TELEPHONE ENCOUNTER
Caller: JOSE DARBY    Relationship to patient: SELF    Best call back number: [phone]    Chief complaint: BILATERAL SHOULDER PAIN    Type of visit: INJECTIONS    Requested date: 10/26/2022       Additional notes:PATIENT NOW HAS A  2 HOUR DRIVE IN TO [place]- WILL BE IN TOWN ON 10/26/2022- WOULD LIKE TO SCHEDULE FOR THAT DATE IF POSSIBLE

## 2022-10-21 ENCOUNTER — OFFICE VISIT (OUTPATIENT)
Dept: ORTHOPEDIC SURGERY | Facility: CLINIC | Age: 62
End: 2022-10-21

## 2022-10-21 VITALS
HEIGHT: 63 IN | BODY MASS INDEX: 27.11 KG/M2 | SYSTOLIC BLOOD PRESSURE: 126 MMHG | WEIGHT: 153 LBS | DIASTOLIC BLOOD PRESSURE: 74 MMHG

## 2022-10-21 DIAGNOSIS — M75.52 BURSITIS OF LEFT SHOULDER: Primary | ICD-10-CM

## 2022-10-21 DIAGNOSIS — M75.51 BURSITIS OF RIGHT SHOULDER: ICD-10-CM

## 2022-10-21 PROCEDURE — 20610 DRAIN/INJ JOINT/BURSA W/O US: CPT | Performed by: PHYSICIAN ASSISTANT

## 2022-10-21 RX ORDER — FAMOTIDINE 20 MG/1
20 TABLET, FILM COATED ORAL 2 TIMES DAILY
COMMUNITY
Start: 2022-10-10

## 2022-10-21 RX ORDER — TRIAMCINOLONE ACETONIDE 1 MG/G
CREAM TOPICAL
COMMUNITY
Start: 2022-10-10

## 2022-10-21 RX ORDER — FLUTICASONE PROPIONATE 50 MCG
2 SPRAY, SUSPENSION (ML) NASAL DAILY
COMMUNITY
Start: 2022-10-10

## 2022-10-21 RX ADMIN — LIDOCAINE HYDROCHLORIDE 4 ML: 10 INJECTION, SOLUTION EPIDURAL; INFILTRATION; INTRACAUDAL; PERINEURAL at 11:22

## 2022-10-21 RX ADMIN — TRIAMCINOLONE ACETONIDE 40 MG: 40 INJECTION, SUSPENSION INTRA-ARTICULAR; INTRAMUSCULAR at 11:22

## 2022-10-21 NOTE — PROGRESS NOTES
"        [facility] [place]        Subjective     CC: Follow-up (Bilateral shoulder pain -- last cortisone injections 7/5/22)      HPI    Yeni Olivo is a 62 y.o. female. Patient returns today for her bilateral shoulder pain.  She has had subacromial injection multiple times in the past with good relief.       Overall, patient's symptoms are worsening    ROS:    Constiutional:Pt denies fever, chills, nausea, or vomiting.  MSK:as above        Objective      Past Medical History  Past Medical History:   Diagnosis Date   • Arthritis    • Arthritis of back    • Arthritis of neck    • Breast cancer (HCC)     2006 LEFT    • Cervical disc disorder    • Drug therapy    • Hip arthrosis    • Knee swelling    • Lumbosacral disc disease    • Lung nodule    • Periarthritis of shoulder    • Radiation    • Rotator cuff syndrome          Physical Exam  /74   Ht 160 cm (63\")   Wt 69.4 kg (153 lb)   BMI 27.10 kg/m²     Body mass index is 27.1 kg/m².    Patient is well nourished and well developed.        Ortho Exam  Bilateral shoulder exam: tender over the anterior lateral shoulder bilaterally.  Good range of motion.  5/5 motor strength.  Neurovascular intact distally    I    Assessment    Assessment:  1. Bursitis of left shoulder    2. Bursitis of right shoulder        Plan:  1. Recommend over the counter anti-inflammatories for pain and/or swelling  2. Bilateral shoulder bursitis.  Patient has had multiple subacromial injections in the past with good relief.  They seem to be getting closer together.  Plan today is repeat subacromial injection.  She will return as needed.  I explained that she may need further imaging in the future.    I discussed with the patient the potential benefits of performing a therapeutic injection of the left shoulder as well as potential risks including but not limited to infection, swelling, pain, bleeding, bruising, nerve/vessel damage, skin color changes, transient " elevation in blood glucose levels, and fat atrophy. After informed consent and verifying correct patient, procedure site, and type of procedure, the area was prepped with Hibiclens, ethyl chloride was used to numb the skin. Via the posterior approach, 4cc of 1% lidocaine and  40mg/ml of Kenalog were injected into the left subacromial space. The patient tolerated the procedure well. There were no complications.     I discussed with the patient the potential benefits of performing a therapeutic injection of the right shoulder as well as potential risks including but not limited to infection, swelling, pain, bleeding, bruising, nerve/vessel damage, skin color changes, transient elevation in blood glucose levels, and fat atrophy. After informed consent and verifying correct patient, procedure site, and type of procedure, the area was prepped with Hibiclens, ethyl chloride was used to numb the skin. Via the posterior approach, 4cc of 1% lidocaine and  40mg/ml of Kenalog were injected into the right subacromial space. The patient tolerated the procedure well. There were no complications.     History, diagnosis and treatment plan discussed with Dr. Owens.              Natlaee Thomas PA-C  10/25/22  19:03 EDT

## 2022-10-21 NOTE — PROGRESS NOTES
Procedure   - Large Joint Arthrocentesis: bilateral subacromial bursa on 10/21/2022 11:22 AM  Indications: pain  Details: 22 G needle, posterior approach  Medications (Right): 4 mL lidocaine PF 1% 1 %; 40 mg triamcinolone acetonide 40 MG/ML  Medications (Left): 4 mL lidocaine PF 1% 1 %; 40 mg triamcinolone acetonide 40 MG/ML  Outcome: tolerated well, no immediate complications  Procedure, treatment alternatives, risks and benefits explained, specific risks discussed. Consent was given by the patient. Immediately prior to procedure a time out was called to verify the correct patient, procedure, equipment, support staff and site/side marked as required. Patient was prepped and draped in the usual sterile fashion.

## 2022-10-25 RX ORDER — TRIAMCINOLONE ACETONIDE 40 MG/ML
40 INJECTION, SUSPENSION INTRA-ARTICULAR; INTRAMUSCULAR
Status: COMPLETED | OUTPATIENT
Start: 2022-10-21 | End: 2022-10-21

## 2022-10-25 RX ORDER — LIDOCAINE HYDROCHLORIDE 10 MG/ML
4 INJECTION, SOLUTION EPIDURAL; INFILTRATION; INTRACAUDAL; PERINEURAL
Status: COMPLETED | OUTPATIENT
Start: 2022-10-21 | End: 2022-10-21

## 2022-11-10 ENCOUNTER — LAB REQUISITION (OUTPATIENT)
Dept: LAB | Facility: HOSPITAL | Age: 62
End: 2022-11-10

## 2022-11-10 DIAGNOSIS — C44.91 BASAL CELL CARCINOMA OF SKIN, UNSPECIFIED: ICD-10-CM

## 2022-11-10 PROCEDURE — 88305 TISSUE EXAM BY PATHOLOGIST: CPT

## 2022-11-11 LAB — REF LAB TEST METHOD: NORMAL

## 2023-04-17 ENCOUNTER — TELEPHONE (OUTPATIENT)
Dept: ORTHOPEDIC SURGERY | Facility: CLINIC | Age: 63
End: 2023-04-17

## 2023-04-17 NOTE — TELEPHONE ENCOUNTER
Caller: JOSE     Relationship to patient: SELF     Best call back number: [phone]     Type of visit: ANABELL SHOULDER INJECTION     Requested date: Thursday OR Friday THIS WEEK

## 2023-04-20 ENCOUNTER — OFFICE VISIT (OUTPATIENT)
Dept: ORTHOPEDIC SURGERY | Facility: CLINIC | Age: 63
End: 2023-04-20
Payer: COMMERCIAL

## 2023-04-20 VITALS
DIASTOLIC BLOOD PRESSURE: 82 MMHG | SYSTOLIC BLOOD PRESSURE: 122 MMHG | WEIGHT: 151 LBS | HEIGHT: 63 IN | BODY MASS INDEX: 26.75 KG/M2

## 2023-04-20 DIAGNOSIS — M75.42 IMPINGEMENT SYNDROME OF LEFT SHOULDER: ICD-10-CM

## 2023-04-20 DIAGNOSIS — M75.41 IMPINGEMENT SYNDROME OF BOTH SHOULDERS: ICD-10-CM

## 2023-04-20 DIAGNOSIS — M75.42 IMPINGEMENT SYNDROME OF BOTH SHOULDERS: ICD-10-CM

## 2023-04-20 DIAGNOSIS — M75.52 BURSITIS OF LEFT SHOULDER: Primary | ICD-10-CM

## 2023-04-20 DIAGNOSIS — M75.111 NONTRAUMATIC INCOMPLETE TEAR OF RIGHT ROTATOR CUFF: ICD-10-CM

## 2023-04-20 DIAGNOSIS — M75.51 BURSITIS OF RIGHT SHOULDER: ICD-10-CM

## 2023-04-20 RX ORDER — TRIAMCINOLONE ACETONIDE 40 MG/ML
40 INJECTION, SUSPENSION INTRA-ARTICULAR; INTRAMUSCULAR
Status: COMPLETED | OUTPATIENT
Start: 2023-04-20 | End: 2023-04-20

## 2023-04-20 RX ORDER — LIDOCAINE HYDROCHLORIDE 10 MG/ML
5 INJECTION, SOLUTION EPIDURAL; INFILTRATION; INTRACAUDAL; PERINEURAL
Status: COMPLETED | OUTPATIENT
Start: 2023-04-20 | End: 2023-04-20

## 2023-04-20 RX ADMIN — LIDOCAINE HYDROCHLORIDE 5 ML: 10 INJECTION, SOLUTION EPIDURAL; INFILTRATION; INTRACAUDAL; PERINEURAL at 09:46

## 2023-04-20 RX ADMIN — TRIAMCINOLONE ACETONIDE 40 MG: 40 INJECTION, SUSPENSION INTRA-ARTICULAR; INTRAMUSCULAR at 09:46

## 2023-04-20 NOTE — PROGRESS NOTES
Procedure   Large Joint Arthrocentesis: R subacromial bursa  Date/Time: 4/20/2023 9:46 AM  Consent given by: patient  Site marked: site marked  Timeout: Immediately prior to procedure a time out was called to verify the correct patient, procedure, equipment, support staff and site/side marked as required   Supporting Documentation  Indications: pain   Procedure Details  Location: shoulder - R subacromial bursa  Preparation: Patient was prepped and draped in the usual sterile fashion  Needle size: 25 G  Approach: posterior  Medications administered: 40 mg triamcinolone acetonide 40 MG/ML; 5 mL lidocaine PF 1% 1 %  Patient tolerance: patient tolerated the procedure well with no immediate complications    Large Joint Arthrocentesis: L subacromial bursa  Date/Time: 4/20/2023 9:46 AM  Consent given by: patient  Site marked: site marked  Timeout: Immediately prior to procedure a time out was called to verify the correct patient, procedure, equipment, support staff and site/side marked as required   Supporting Documentation  Indications: pain   Procedure Details  Location: shoulder - L subacromial bursa  Preparation: Patient was prepped and draped in the usual sterile fashion  Needle size: 25 G  Approach: posterior  Medications administered: 40 mg triamcinolone acetonide 40 MG/ML; 5 mL lidocaine PF 1% 1 %  Patient tolerance: patient tolerated the procedure well with no immediate complications

## 2023-04-20 NOTE — PROGRESS NOTES
[facility] [place] [facility] Office Follow Up       Office Follow Up Visit       Patient Name: Yeni Olivo    Chief Complaint:   Chief Complaint   Patient presents with   • Follow-up     9 month follow up --- Bilateral shoulder pain        Referring Physician: No ref. provider found    History of Present Illness:   It has been 9  month(s) since Yeni Olivo's last visit. Yeni Olivo returns to clinic today for F/U: follow-up of bilateralBody Part: shoulderReason: pain. The issue has been ongoing for 10 year(s). Yeni Olivo rates HIS/HER: herpain at 6/10 on the pain scale. Previous/current treatments: NSAIDS and physical therapy. Current symptoms:Symptoms: same as prior visit. The pain is worse with working and lying on affected side; resting improves the pain. Overall, he/she: sheis doing worse.  I have reviewed the patient's history of present illness as noted/entered above.    I have reviewed the patient's past medical history, surgical history, social history, family history, medications, and allergies as noted in the electronic medical record and as noted/entered.  I have reviewed the patient's review of systems as noted/enter and updated as noted in the patient's HPI.    Chronic bilateral shoulder pain for the last 10 years  Desires  bilateral shoulder injections again today she is responded well to those      Subjective   Subjective      Review of Systems   Constitutional: Negative.  Negative for chills, fatigue and fever.   HENT: Negative.  Negative for congestion and dental problem.    Eyes: Negative.  Negative for blurred vision.   Respiratory: Negative.  Negative for shortness of breath.    Cardiovascular: Negative.  Negative for leg swelling.   Gastrointestinal: Negative.  Negative for abdominal pain.   Endocrine: Negative.  Negative for polyuria.   Genitourinary: Negative.  Negative for difficulty urinating.    Musculoskeletal: Positive for arthralgias.   Skin: Negative.    Allergic/Immunologic: Negative.    Neurological: Negative.    Hematological: Negative.  Negative for adenopathy.   Psychiatric/Behavioral: Negative.  Negative for behavioral problems.        Past Medical History:   Past Medical History:   Diagnosis Date   • Arthritis    • Arthritis of back    • Arthritis of neck    • Breast cancer     2006 LEFT    • Cervical disc disorder    • Drug therapy    • Hip arthrosis    • Knee swelling    • Lumbosacral disc disease    • Lung nodule    • Neck strain    • Periarthritis of shoulder    • Radiation    • Rotator cuff syndrome        Past Surgical History:   Past Surgical History:   Procedure Laterality Date   • BREAST BIOPSY     •  SECTION     • HYSTERECTOMY     • MASTECTOMY      BILATERAL   • OTHER SURGICAL HISTORY  2018    stones removed from submandibular gland    • TRIGGER POINT INJECTION  Bilateral shoulders       Family History:   Family History   Problem Relation Age of Onset   • COPD Mother    • Diabetes Mother    • No Known Problems Father    • Multiple sclerosis Sister    • No Known Problems Brother    • ADD / ADHD Daughter    • Drug abuse Daughter    • Bipolar disorder Daughter    • No Known Problems Son    • Stroke Maternal Grandmother    • No Known Problems Paternal Grandmother    • No Known Problems Maternal Aunt    • No Known Problems Paternal Aunt        Social History:   Social History     Socioeconomic History   • Marital status:    • Number of children: 2   Tobacco Use   • Smoking status: Former     Packs/day: 0.25     Years: 40.00     Pack years: 10.00     Types: Cigarettes     Quit date: 3/1/2021     Years since quittin.1   • Smokeless tobacco: Never   • Tobacco comments:     occasional vape   Vaping Use   • Vaping Use: Every day   • Substances: Nicotine   Substance and Sexual Activity   • Alcohol use: Not Currently   • Drug use: Not Currently     Types: Marijuana   •  "Sexual activity: Not Currently       Medications:   Current Outpatient Medications:   •  acetaminophen (TYLENOL) 500 MG tablet, Take 1 tablet by mouth Every 6 (Six) Hours As Needed for Mild Pain. Pt states she takes 2 in the AM & 1 in the PM, Disp: , Rfl:   •  acyclovir (ZOVIRAX) 400 MG tablet, Take 1 tablet by mouth 2 (two) times a day., Disp: 180 tablet, Rfl: 1  •  famotidine (PEPCID) 20 MG tablet, Take 1 tablet by mouth 2 (Two) Times a Day., Disp: , Rfl:   •  fluticasone (FLONASE) 50 MCG/ACT nasal spray, 2 sprays by Each Nare route Daily., Disp: , Rfl:   •  mupirocin (BACTROBAN) 2 % ointment, APPLY TO BIOPSY SITES 1-2 TIMES DAILY UNTIL WELL HEALED., Disp: , Rfl:   •  venlafaxine XR (EFFEXOR-XR) 150 MG 24 hr capsule, Take 1 capsule by mouth Daily., Disp: 90 capsule, Rfl: 0    Allergies:   Allergies   Allergen Reactions   • Penicillins Anaphylaxis   • Codeine Nausea Only   • Reglan [Metoclopramide] Unknown (See Comments)     neurological   • Talwin [Pentazocine] Other (See Comments)     Oral swelling.        The following portions of the patient's history were reviewed and updated as appropriate: allergies, current medications, past family history, past medical history, past social history, past surgical history and problem list.        Objective    Objective      Vital Signs:   Vitals:    04/20/23 0919   BP: 122/82   Weight: 68.5 kg (151 lb)   Height: 160 cm (62.99\")       Ortho Exam:  Bilateral shoulder pain    Results Review:  Imaging Results (Last 24 Hours)     ** No results found for the last 24 hours. **          Procedures    Bilateral shoulder subacromial injections -- BILATERAL SHOULDER SUBACROMIAL SPACE INJECTION: Risks and benefits of a shoulder subacromial space injection were discussed and the patient desired to proceed. Verbal consent was obtained. The patient understood the risk of infection, potential skin changes, bump in blood glucose especially with diabetes, nerve injury, possibility of " increased pain in the short term, and possible incomplete pain relief.  Using sterile technique, the shoulder subacromial space was injected from a posterior approach with 1mL of 40 mg triamcinolone acetonide 40 MG/ML and 4cc of lidocaine with aspiration prior to injection. The patient tolerated the procedure without difficulty.  CPT CODE 10528 for major joint aspiration/injection          Assessment / Plan      Assessment/Plan:   Problem List Items Addressed This Visit        Musculoskeletal and Injuries    Bursitis of left shoulder - Primary    Impingement syndrome of left shoulder    Nontraumatic incomplete tear of right rotator cuff    Bursitis of right shoulder    Impingement syndrome of both shoulders       Bilateral subacromial injections 4/20/2023    Follow Up: As needed      Benedict Price MD, FAAOS  Orthopedic Surgeon  Fellowship Trained Shoulder and Elbow Surgeon  [facility]  Orthopedics and Sports Medicine  [address] [place] [address], [address]  [place] [address]    04/20/23  09:41 EDT

## 2023-05-01 ENCOUNTER — TELEPHONE (OUTPATIENT)
Dept: ORTHOPEDIC SURGERY | Facility: CLINIC | Age: 63
End: 2023-05-01
Payer: COMMERCIAL

## 2023-05-01 NOTE — TELEPHONE ENCOUNTER
Spoke with pt who reports the last injection did not improve her symptoms at all (11 days since last injection); reports her pain is worse than before the injection actually. She is having a hard time even lifting the arm so I recommended she come back in for a repeat evaluation; She was agreeable to coming in Thursday at 11:15am to see Dr. Price for the left shoulder.    Agus THORNTON CMA ([facility]), ROT

## 2023-05-01 NOTE — TELEPHONE ENCOUNTER
Caller: JOSE   Relationship to Patient: SELF   Phone Number: [phone]   Reason for Call:  PATIENT CALLING STATING THAT HER SHOULDER INJECTION HAS NOT HELPED HER LEFT SHOULDER AT ALL SHE STATES THAT ITS WORSE THAN IT WAS BEFORE SHE CAME IN THE RIGHT SIDE IS FINE

## 2023-05-04 ENCOUNTER — OFFICE VISIT (OUTPATIENT)
Dept: ORTHOPEDIC SURGERY | Facility: CLINIC | Age: 63
End: 2023-05-04
Payer: COMMERCIAL

## 2023-05-04 VITALS
WEIGHT: 151 LBS | DIASTOLIC BLOOD PRESSURE: 80 MMHG | SYSTOLIC BLOOD PRESSURE: 118 MMHG | BODY MASS INDEX: 26.75 KG/M2 | HEIGHT: 63 IN

## 2023-05-04 DIAGNOSIS — M75.52 BURSITIS OF LEFT SHOULDER: ICD-10-CM

## 2023-05-04 DIAGNOSIS — M75.22 BICEPS TENDINITIS OF LEFT UPPER EXTREMITY: Primary | ICD-10-CM

## 2023-05-04 DIAGNOSIS — M75.42 IMPINGEMENT SYNDROME OF LEFT SHOULDER: ICD-10-CM

## 2023-05-04 RX ORDER — TRIAMCINOLONE ACETONIDE 40 MG/ML
40 INJECTION, SUSPENSION INTRA-ARTICULAR; INTRAMUSCULAR
Status: COMPLETED | OUTPATIENT
Start: 2023-05-04 | End: 2023-05-04

## 2023-05-04 RX ORDER — LIDOCAINE HYDROCHLORIDE 10 MG/ML
5 INJECTION, SOLUTION EPIDURAL; INFILTRATION; INTRACAUDAL; PERINEURAL
Status: COMPLETED | OUTPATIENT
Start: 2023-05-04 | End: 2023-05-04

## 2023-05-04 RX ADMIN — LIDOCAINE HYDROCHLORIDE 5 ML: 10 INJECTION, SOLUTION EPIDURAL; INFILTRATION; INTRACAUDAL; PERINEURAL at 11:22

## 2023-05-04 RX ADMIN — TRIAMCINOLONE ACETONIDE 40 MG: 40 INJECTION, SUSPENSION INTRA-ARTICULAR; INTRAMUSCULAR at 11:22

## 2023-05-04 NOTE — PROGRESS NOTES
Procedure   Large Joint Arthrocentesis  Date/Time: 5/4/2023 11:22 AM  Consent given by: patient  Site marked: site marked  Timeout: Immediately prior to procedure a time out was called to verify the correct patient, procedure, equipment, support staff and site/side marked as required   Supporting Documentation  Indications: pain   Procedure Details  Location: shoulder - Shoulder joint: biceps.  Preparation: Patient was prepped and draped in the usual sterile fashion  Needle gauge: 21g.  Approach: anterior  Medications administered: 5 mL lidocaine PF 1% 1 %; 40 mg triamcinolone acetonide 40 MG/ML  Patient tolerance: patient tolerated the procedure well with no immediate complications

## 2023-05-04 NOTE — PROGRESS NOTES
[facility] [place] [place] Orthopaedic Surgery Office Follow Up       Office Follow Up Visit       Patient Name: Yeni Olivo    Chief Complaint:   Chief Complaint   Patient presents with   • Follow-up     2 week follow up - Bursitis of left shoulder        Referring Physician: No ref. provider found    History of Present Illness:   It has been 2  week(s) since Yeni Olivo's last visit. Yeni Olivo returns to clinic today for F/U: follow-up of leftBody Part: shoulderReason: pain. The issue has been ongoing for 2 week(s). Yeni Olivo rates HIS/HER: herpain at 8/10 on the pain scale. Previous/current treatments: NSAIDS and physical therapy. Current symptoms:Symptoms: pain, popping and grinding. The pain is worse with any movement of the joint; resting improves the pain. Overall, he/she: sheis doing worse.  I have reviewed the patient's history of present illness as noted/entered above.    I have reviewed the patient's past medical history, surgical history, social history, family history, medications, and allergies as noted in the electronic medical record and as noted/entered.  I have reviewed the patient's review of systems as noted/enter and updated as noted in the patient's HPI.    Chronic bilateral shoulder pain for the last 10 years  Desires  bilateral shoulder injections again today she is responded well to those    5/4/2023:  Bilateral shoulder pain the right responded to the injection, the left pain persists.  She has had injections for many years prior was under the care of Dr. Virk.  She is responded to steroid injections with many injections over the years but has done incredibly well.    Bilateral subacromial injections 4/20/2023; RIGHT doing well    No evidence of infection no erythema no evidence of septic joint.  Exacerbation following a prior injection.  Counseled on treatment options.    New baby grandsonMalcom. 1 week  old    The pain today in the left shoulder is more anterior biceps pain.  I do think it is reasonable for an injection for that and conservative course for that.  Did  on possible MRI    Subjective   Subjective      Review of Systems     Past Medical History:   Past Medical History:   Diagnosis Date   • Arthritis    • Arthritis of back    • Arthritis of neck    • Breast cancer     2006 LEFT    • Cervical disc disorder    • Drug therapy    • Hip arthrosis    • Knee swelling    • Lumbosacral disc disease    • Lung nodule    • Neck strain    • Periarthritis of shoulder    • Radiation    • Rotator cuff syndrome        Past Surgical History:   Past Surgical History:   Procedure Laterality Date   • BREAST BIOPSY     •  SECTION     • HYSTERECTOMY     • MASTECTOMY      BILATERAL   • OTHER SURGICAL HISTORY  2018    stones removed from submandibular gland    • TRIGGER POINT INJECTION  Bilateral shoulders       Family History:   Family History   Problem Relation Age of Onset   • COPD Mother    • Diabetes Mother    • No Known Problems Father    • Multiple sclerosis Sister    • No Known Problems Brother    • ADD / ADHD Daughter    • Drug abuse Daughter    • Bipolar disorder Daughter    • No Known Problems Son    • Stroke Maternal Grandmother    • No Known Problems Paternal Grandmother    • No Known Problems Maternal Aunt    • No Known Problems Paternal Aunt        Social History:   Social History     Socioeconomic History   • Marital status:    • Number of children: 2   Tobacco Use   • Smoking status: Former     Packs/day: 0.25     Years: 40.00     Pack years: 10.00     Types: Cigarettes     Quit date: 3/1/2021     Years since quittin.1   • Smokeless tobacco: Never   • Tobacco comments:     occasional vape   Vaping Use   • Vaping Use: Every day   • Substances: Nicotine   Substance and Sexual Activity   • Alcohol use: Not Currently   • Drug use: Not Currently     Types: Marijuana   • Sexual activity:  "Not Currently       Medications:   Current Outpatient Medications:   •  acetaminophen (TYLENOL) 500 MG tablet, Take 1 tablet by mouth Every 6 (Six) Hours As Needed for Mild Pain. Pt states she takes 2 in the AM & 1 in the PM, Disp: , Rfl:   •  acyclovir (ZOVIRAX) 400 MG tablet, Take 1 tablet by mouth 2 (two) times a day., Disp: 180 tablet, Rfl: 1  •  famotidine (PEPCID) 20 MG tablet, Take 1 tablet by mouth 2 (Two) Times a Day., Disp: , Rfl:   •  fluticasone (FLONASE) 50 MCG/ACT nasal spray, 2 sprays by Each Nare route Daily., Disp: , Rfl:   •  mupirocin (BACTROBAN) 2 % ointment, APPLY TO BIOPSY SITES 1-2 TIMES DAILY UNTIL WELL HEALED., Disp: , Rfl:   •  venlafaxine XR (EFFEXOR-XR) 150 MG 24 hr capsule, Take 1 capsule by mouth Daily., Disp: 90 capsule, Rfl: 0    Allergies:   Allergies   Allergen Reactions   • Penicillins Anaphylaxis   • Codeine Nausea Only   • Reglan [Metoclopramide] Unknown (See Comments)     neurological   • Talwin [Pentazocine] Other (See Comments)     Oral swelling.        The following portions of the patient's history were reviewed and updated as appropriate: allergies, current medications, past family history, past medical history, past social history, past surgical history and problem list.        Objective    Objective      Vital Signs:   Vitals:    05/04/23 1102   BP: 118/80   Weight: 68.5 kg (151 lb)   Height: 160 cm (62.99\")       Ortho Exam:  Left shoulder anterior biceps pain no erythema no evidence of septic joint the area of pain is more anterior with the biceps positive O'Briens biceps positive upper cut and speeds test    Results Review:  Imaging Results (Last 24 Hours)     ** No results found for the last 24 hours. **            Procedures    LEFT SHOULDER BICEPS TENDON SHEATH INJECTION: Risks and benefits of a shoulder biceps tendon sheath injection were discussed and the patient desired to proceed. Verbal consent was obtained. The patient understood the risk of infection, " potential skin changes, bump in blood glucose especially with diabetes, nerve injury, possibility of increased pain in the short term, and possible incomplete pain relief. Using sterile technique, the shoulder biceps tendon sheath was injected from an anterior approach with 40 mg triamcinolone acetonide 40 MG/ML and 4cc of lidocaine with aspiration prior to injection. The patient tolerated the procedure without difficulty. CPT CODE 79646 for tendon sheath injection          Assessment / Plan      Assessment/Plan:   Problem List Items Addressed This Visit        Musculoskeletal and Injuries    Bursitis of left shoulder    Impingement syndrome of left shoulder    Biceps tendinitis of left upper extremity - Primary       The pain today in the left shoulder is more anterior biceps pain.  I do think it is reasonable for an injection for that and conservative course for that.  Did  on possible MRI --discussed MRI would be next step if not improving with conservative course.    Pain is in a new area today the anterior biceps tendon sheath.    Left shoulder anterior biceps tendinitis    Follow Up: She will keep me updated pending progress      Benedict Price MD, FAAOS  Orthopedic Surgeon  Fellowship Trained Shoulder and Elbow Surgeon  [facility]  Orthopedics and Sports Medicine  [address] [place] [address], [address]  [place] [address]    05/04/23  11:29 EDT

## 2023-07-26 DIAGNOSIS — R91.1 LUNG NODULE: Primary | ICD-10-CM

## 2023-08-02 ENCOUNTER — TELEPHONE (OUTPATIENT)
Dept: ORTHOPEDIC SURGERY | Facility: CLINIC | Age: 63
End: 2023-08-02

## 2023-08-02 NOTE — TELEPHONE ENCOUNTER
Caller: JOSE DARBY     Relationship to patient: TYREE ART    Willian call back number: [phone]    Chief complaint: ANABELL SHOULDER INJECTIONS    Type of visit: INJECTION     Requested date: ASAP     If rescheduling, when is the original appointment: N/A     Additional notes: N/A

## 2023-08-07 ENCOUNTER — OFFICE VISIT (OUTPATIENT)
Dept: ORTHOPEDIC SURGERY | Facility: CLINIC | Age: 63
End: 2023-08-07
Payer: COMMERCIAL

## 2023-08-07 VITALS
WEIGHT: 148 LBS | DIASTOLIC BLOOD PRESSURE: 76 MMHG | BODY MASS INDEX: 26.22 KG/M2 | HEIGHT: 63 IN | SYSTOLIC BLOOD PRESSURE: 122 MMHG

## 2023-08-07 DIAGNOSIS — M75.52 BURSITIS OF LEFT SHOULDER: Primary | ICD-10-CM

## 2023-08-07 DIAGNOSIS — M75.51 BURSITIS OF RIGHT SHOULDER: ICD-10-CM

## 2023-08-07 DIAGNOSIS — M75.22 BICEPS TENDINITIS OF LEFT UPPER EXTREMITY: ICD-10-CM

## 2023-08-07 DIAGNOSIS — M75.41 IMPINGEMENT SYNDROME OF BOTH SHOULDERS: ICD-10-CM

## 2023-08-07 DIAGNOSIS — M75.42 IMPINGEMENT SYNDROME OF BOTH SHOULDERS: ICD-10-CM

## 2023-08-07 DIAGNOSIS — M75.42 IMPINGEMENT SYNDROME OF LEFT SHOULDER: ICD-10-CM

## 2023-08-07 RX ORDER — LIDOCAINE HYDROCHLORIDE 10 MG/ML
5 INJECTION, SOLUTION EPIDURAL; INFILTRATION; INTRACAUDAL; PERINEURAL
Status: COMPLETED | OUTPATIENT
Start: 2023-08-07 | End: 2023-08-07

## 2023-08-07 RX ORDER — TRIAMCINOLONE ACETONIDE 40 MG/ML
40 INJECTION, SUSPENSION INTRA-ARTICULAR; INTRAMUSCULAR
Status: COMPLETED | OUTPATIENT
Start: 2023-08-07 | End: 2023-08-07

## 2023-08-07 RX ADMIN — TRIAMCINOLONE ACETONIDE 40 MG: 40 INJECTION, SUSPENSION INTRA-ARTICULAR; INTRAMUSCULAR at 13:20

## 2023-08-07 RX ADMIN — LIDOCAINE HYDROCHLORIDE 5 ML: 10 INJECTION, SOLUTION EPIDURAL; INFILTRATION; INTRACAUDAL; PERINEURAL at 13:20

## 2023-08-07 NOTE — PROGRESS NOTES
Procedure   - Large Joint Arthrocentesis: bilateral subacromial bursa on 8/7/2023 1:20 PM  Indications: pain  Details: 21 G needle, posterior approach  Medications (Right): 5 mL lidocaine PF 1% 1 %; 40 mg triamcinolone acetonide 40 MG/ML  Medications (Left): 5 mL lidocaine PF 1% 1 %; 40 mg triamcinolone acetonide 40 MG/ML  Outcome: tolerated well, no immediate complications  Procedure, treatment alternatives, risks and benefits explained, specific risks discussed. Consent was given by the patient. Immediately prior to procedure a time out was called to verify the correct patient, procedure, equipment, support staff and site/side marked as required. Patient was prepped and draped in the usual sterile fashion.

## 2023-10-26 ENCOUNTER — OFFICE VISIT (OUTPATIENT)
Dept: ORTHOPEDIC SURGERY | Facility: CLINIC | Age: 63
End: 2023-10-26
Payer: COMMERCIAL

## 2023-10-26 VITALS — HEIGHT: 63 IN | WEIGHT: 149 LBS | BODY MASS INDEX: 26.4 KG/M2

## 2023-10-26 DIAGNOSIS — M75.22 BICEPS TENDINITIS OF LEFT UPPER EXTREMITY: Primary | ICD-10-CM

## 2023-10-26 DIAGNOSIS — M75.41 IMPINGEMENT SYNDROME OF BOTH SHOULDERS: ICD-10-CM

## 2023-10-26 DIAGNOSIS — M75.21 BICEPS TENDINITIS OF RIGHT UPPER EXTREMITY: ICD-10-CM

## 2023-10-26 DIAGNOSIS — M75.42 IMPINGEMENT SYNDROME OF BOTH SHOULDERS: ICD-10-CM

## 2023-10-26 RX ORDER — TRIAMCINOLONE ACETONIDE 40 MG/ML
40 INJECTION, SUSPENSION INTRA-ARTICULAR; INTRAMUSCULAR
Status: COMPLETED | OUTPATIENT
Start: 2023-10-26 | End: 2023-10-26

## 2023-10-26 RX ORDER — LIDOCAINE HYDROCHLORIDE 10 MG/ML
5 INJECTION, SOLUTION EPIDURAL; INFILTRATION; INTRACAUDAL; PERINEURAL
Status: COMPLETED | OUTPATIENT
Start: 2023-10-26 | End: 2023-10-26

## 2023-10-26 RX ADMIN — TRIAMCINOLONE ACETONIDE 40 MG: 40 INJECTION, SUSPENSION INTRA-ARTICULAR; INTRAMUSCULAR at 11:42

## 2023-10-26 RX ADMIN — LIDOCAINE HYDROCHLORIDE 5 ML: 10 INJECTION, SOLUTION EPIDURAL; INFILTRATION; INTRACAUDAL; PERINEURAL at 11:40

## 2023-10-26 RX ADMIN — LIDOCAINE HYDROCHLORIDE 5 ML: 10 INJECTION, SOLUTION EPIDURAL; INFILTRATION; INTRACAUDAL; PERINEURAL at 11:42

## 2023-10-26 RX ADMIN — TRIAMCINOLONE ACETONIDE 40 MG: 40 INJECTION, SUSPENSION INTRA-ARTICULAR; INTRAMUSCULAR at 11:40

## 2023-10-26 NOTE — PROGRESS NOTES
Procedure   Bicep Tendon  Date/Time: 10/26/2023 11:40 AM  Consent given by: patient  Site marked: site marked  Timeout: Immediately prior to procedure a time out was called to verify the correct patient, procedure, equipment, support staff and site/side marked as required   Supporting Documentation  Indications: pain   Procedure Details  Location: shoulder - Shoulder joint: Bicep Tendon.  Preparation: Patient was prepped and draped in the usual sterile fashion  Needle gauge: 21g.  Approach: anterior  Medications administered: 5 mL lidocaine PF 1% 1 %; 40 mg triamcinolone acetonide 40 MG/ML  Patient tolerance: patient tolerated the procedure well with no immediate complications

## 2023-10-26 NOTE — PROGRESS NOTES
Procedure   Bicep Tendon  Date/Time: 10/26/2023 11:42 AM  Consent given by: patient  Site marked: site marked  Timeout: Immediately prior to procedure a time out was called to verify the correct patient, procedure, equipment, support staff and site/side marked as required   Supporting Documentation  Indications: pain   Procedure Details  Location: shoulder - Shoulder joint: Bicep Tendon.  Preparation: Patient was prepped and draped in the usual sterile fashion  Needle gauge: 21g.  Approach: anterior  Medications administered: 5 mL lidocaine PF 1% 1 %; 40 mg triamcinolone acetonide 40 MG/ML  Patient tolerance: patient tolerated the procedure well with no immediate complications

## 2023-10-26 NOTE — PROGRESS NOTES
[facility] [place] [facility] Office Follow Up       Office Follow Up Visit       Patient Name: Yeni Olivo    Chief Complaint:   Chief Complaint   Patient presents with    Follow-up     2 mo f/u - bilateral shoulder pain       Referring Physician: No ref. provider found    History of Present Illness:   It has been 2  month(s) since Yeni Olivo's last visit. Yeni Olivo returns to clinic today for F/U: follow-up of bilateralBody Part: shoulderReason: pain. The issue has been ongoing for 8 year(s). Yeni Olivo rates HIS/HER: herpain at 8/10 on the pain scale. Previous/current treatments: NSAIDS and physical therapy. Current symptoms:Symptoms: same as prior visit. The pain is worse with any movement of the joint; nothing improves the pain. Overall, he/she: sheis doing worse.  I have reviewed the patient's history of present illness as noted/entered above.    I have reviewed the patient's past medical history, surgical history, social history, family history, medications, and allergies as noted in the electronic medical record and as noted/entered.  I have reviewed the patient's review of systems as noted/enter and updated as noted in the patient's HPI.    5/4/2023:  Bilateral shoulder pain the right responded to the injection, the left pain persists.  She has had injections for many years prior was under the care of Dr. Virk.  She is responded to steroid injections with many injections over the years but has done incredibly well.     Bilateral subacromial injections 4/20/2023; RIGHT doing well     No evidence of infection no erythema no evidence of septic joint.  Exacerbation following a prior injection.  Counseled on treatment options.     New baby grandson, Malcom. 1 week old     The pain today in the left shoulder is more anterior biceps pain.  I do think it is reasonable for an injection for that and conservative course for  that.  Did  on possible MRI      10/26/2023:  Bilateral shoulder pain, left worse than right    Overall the left shoulder feels much worse.  Desires bilateral biceps tendon sheath injections.  She notes significant improvement even prior to departure from the clinic today.      She would like to wait until after the new year to pursue updated x-rays and possible MRI of the left shoulder.  She would like to consider left shoulder surgery.  We will need updated imaging and MRI at that time.      Subjective   Subjective      Review of Systems   Constitutional: Negative.  Negative for chills, fatigue and fever.   HENT: Negative.  Negative for congestion and dental problem.    Eyes: Negative.  Negative for blurred vision.   Respiratory: Negative.  Negative for shortness of breath.    Cardiovascular: Negative.  Negative for leg swelling.   Gastrointestinal: Negative.  Negative for abdominal pain.   Endocrine: Negative.  Negative for polyuria.   Genitourinary: Negative.  Negative for difficulty urinating.   Musculoskeletal:  Positive for arthralgias.   Skin: Negative.    Allergic/Immunologic: Negative.    Neurological: Negative.    Hematological: Negative.  Negative for adenopathy.   Psychiatric/Behavioral: Negative.  Negative for behavioral problems.         Past Medical History:   Past Medical History:   Diagnosis Date    Arthritis     Arthritis of back     Arthritis of neck     Breast cancer     2006 LEFT     Cervical disc disorder     Drug therapy     Hip arthrosis     Knee swelling     Lumbosacral disc disease     Lung nodule     Neck strain     Periarthritis of shoulder     Radiation     Rotator cuff syndrome        Past Surgical History:   Past Surgical History:   Procedure Laterality Date    BREAST BIOPSY       SECTION      HYSTERECTOMY      MASTECTOMY      BILATERAL    OTHER SURGICAL HISTORY  2018    stones removed from submandibular gland     TRIGGER POINT INJECTION  Bilateral shoulders        Family History:   Family History   Problem Relation Age of Onset    COPD Mother     Diabetes Mother     No Known Problems Father     Multiple sclerosis Sister     No Known Problems Brother     ADD / ADHD Daughter     Drug abuse Daughter     Bipolar disorder Daughter     No Known Problems Son     Stroke Maternal Grandmother     No Known Problems Paternal Grandmother     No Known Problems Maternal Aunt     No Known Problems Paternal Aunt        Social History:   Social History     Socioeconomic History    Marital status:     Number of children: 2   Tobacco Use    Smoking status: Former     Packs/day: 0.25     Years: 40.00     Additional pack years: 0.00     Total pack years: 10.00     Types: Cigarettes     Quit date: 3/1/2021     Years since quittin.6    Smokeless tobacco: Never    Tobacco comments:     occasional vape   Vaping Use    Vaping Use: Every day    Substances: Nicotine   Substance and Sexual Activity    Alcohol use: Not Currently    Drug use: Not Currently     Types: Marijuana    Sexual activity: Not Currently       Medications:   Current Outpatient Medications:     acetaminophen (TYLENOL) 500 MG tablet, Take 1 tablet by mouth Every 6 (Six) Hours As Needed for Mild Pain. Pt states she takes 2 in the AM & 1 in the PM, Disp: , Rfl:     acyclovir (ZOVIRAX) 400 MG tablet, Take 1 tablet by mouth 2 (two) times a day., Disp: 180 tablet, Rfl: 1    famotidine (PEPCID) 20 MG tablet, Take 1 tablet by mouth 2 (Two) Times a Day., Disp: , Rfl:     fluticasone (FLONASE) 50 MCG/ACT nasal spray, 2 sprays by Each Nare route Daily., Disp: , Rfl:     mupirocin (BACTROBAN) 2 % ointment, APPLY TO BIOPSY SITES 1-2 TIMES DAILY UNTIL WELL HEALED., Disp: , Rfl:     venlafaxine XR (EFFEXOR-XR) 150 MG 24 hr capsule, Take 1 capsule by mouth Daily., Disp: 90 capsule, Rfl: 0    Allergies:   Allergies   Allergen Reactions    Penicillins Anaphylaxis    Codeine Nausea Only    Reglan [Metoclopramide] Unknown (See Comments)  "    neurological    Talwin [Pentazocine] Other (See Comments)     Oral swelling.        The following portions of the patient's history were reviewed and updated as appropriate: allergies, current medications, past family history, past medical history, past social history, past surgical history and problem list.        Objective    Objective      Vital Signs:   Vitals:    10/26/23 1056   Weight: 67.6 kg (149 lb)   Height: 158.8 cm (62.52\")       Ortho Exam:  Left worse than right anterior biceps tendon sheath pain    Results Review:  Imaging Results (Last 24 Hours)       ** No results found for the last 24 hours. **            Procedures    Bilateral SHOULDER BICEPS TENDON SHEATH INJECTION: Risks and benefits of a shoulder biceps tendon sheath injection were discussed and the patient desired to proceed. Verbal consent was obtained. The patient understood the risk of infection, potential skin changes, bump in blood glucose especially with diabetes, nerve injury, possibility of increased pain in the short term, and possible incomplete pain relief. Using sterile technique, the shoulder biceps tendon sheath was injected from an anterior approach with 40 mg triamcinolone acetonide 40 MG/ML and 5cc of lidocaine with aspiration prior to injection. The patient tolerated the procedure without difficulty. CPT CODE 08673 for tendon sheath injection          Assessment / Plan      Assessment/Plan:   Problem List Items Addressed This Visit          Musculoskeletal and Injuries    Biceps tendinitis of left upper extremity    Biceps tendinitis of right upper extremity    Impingement syndrome of both shoulders - Primary       10/26/2023 -- bilateral biceps tendon sheath injection    Follow Up: She wants to come back at the beginning of the upcoming year we will obtain left shoulder 3 views and then possible subsequent follow-up left MRI.      Benedict Price MD, FAAOS  Orthopedic Surgeon  Fellowship Trained Shoulder and Elbow " Surgeon  [facility]  Orthopedics and Sports Medicine  [facility], [address]  [place] [address]    10/26/23  11:55 EDT

## 2024-01-08 ENCOUNTER — TELEPHONE (OUTPATIENT)
Dept: ORTHOPEDIC SURGERY | Facility: CLINIC | Age: 64
End: 2024-01-08

## 2024-01-08 NOTE — TELEPHONE ENCOUNTER
Caller: Yeni Olivo    Relationship: Self    Best call back number: [phone]    What orders are you requesting (i.e. lab or imaging): MRI    In what timeframe would the patient need to come in: ASAP    Where will you receive your lab/imaging services: ANYWHERE    Additional notes: LEFT SHOULDER MRI REQ

## 2024-01-08 NOTE — TELEPHONE ENCOUNTER
Federica-please see message below and advise whether we can go ahead and place MRI order for pt's left shoulder or if pt needs to come in for follow up first with repeat xrays (as mentioned in Dr. Price's last note). Thanks!    Agus THORNTON CMA ([facility]), ROT

## 2024-01-26 ENCOUNTER — OFFICE VISIT (OUTPATIENT)
Dept: ORTHOPEDIC SURGERY | Facility: CLINIC | Age: 64
End: 2024-01-26
Payer: COMMERCIAL

## 2024-01-26 VITALS
HEIGHT: 62 IN | BODY MASS INDEX: 27.31 KG/M2 | SYSTOLIC BLOOD PRESSURE: 118 MMHG | DIASTOLIC BLOOD PRESSURE: 78 MMHG | WEIGHT: 148.4 LBS

## 2024-01-26 DIAGNOSIS — M75.22 BICEPS TENDINITIS OF LEFT UPPER EXTREMITY: Primary | ICD-10-CM

## 2024-01-26 DIAGNOSIS — M75.112 NONTRAUMATIC INCOMPLETE TEAR OF LEFT ROTATOR CUFF: ICD-10-CM

## 2024-01-26 DIAGNOSIS — M75.42 IMPINGEMENT SYNDROME OF LEFT SHOULDER: ICD-10-CM

## 2024-01-26 DIAGNOSIS — M75.52 BURSITIS OF LEFT SHOULDER: ICD-10-CM

## 2024-01-26 DIAGNOSIS — S46.002A INJURY OF LEFT ROTATOR CUFF, INITIAL ENCOUNTER: ICD-10-CM

## 2024-01-26 NOTE — PROGRESS NOTES
[facility] [place] [facility] Office Follow Up       Office Follow Up Visit       Patient Name: Yeni Olivo    Chief Complaint:   Chief Complaint   Patient presents with    Follow-up     3 month follow-up: Biceps tendinitis of left upper extremity       Referring Physician: No ref. provider found    History of Present Illness:   It has been 3  month(s) since Yeni Olivo's last visit. Yeni Olivo returns to clinic today for F/U: follow-up of leftBody Part: shoulderReason: pain. The issue has been ongoing for 8-9 year(s). Yeni Olivo rates HIS/HER: herpain at 7/10 on the pain scale. Previous/current treatments: NSAIDS, physical therapy, and steroid injection (last injection 10/26/23). Current symptoms:Symptoms: same as prior visit. The pain is worse with sleeping, working, leisure, lying on affected side, and any movement of the joint; pain medication and/or NSAID improves the pain. Overall, he/she: sheis doing about the same -anterior biceps pain, AC joint pain.  I have reviewed the patient's history of present illness as noted/entered above.    I have reviewed the patient's past medical history, surgical history, social history, family history, medications, and allergies as noted in the electronic medical record and as noted/entered.  I have reviewed the patient's review of systems as noted/enter and updated as noted in the patient's HPI.      LEFT SHOULDER      5/4/2023:  Bilateral shoulder pain the right responded to the injection, the left pain persists.  She has had injections for many years prior was under the care of Dr. Virk.  She is responded to steroid injections with many injections over the years but has done incredibly well.     Bilateral subacromial injections 4/20/2023; RIGHT doing well     No evidence of infection no erythema no evidence of septic joint.  Exacerbation following a prior injection.  Counseled on  treatment options.     New baby grandsonMalcom. 1 week old     The pain today in the left shoulder is more anterior biceps pain.  I do think it is reasonable for an injection for that and conservative course for that.  Did  on possible MRI        10/26/2023:  Bilateral shoulder pain, left worse than right     Overall the left shoulder feels much worse.  Desires bilateral biceps tendon sheath injections.  She notes significant improvement even prior to departure from the clinic today.        She would like to wait until after the new year to pursue updated x-rays and possible MRI of the left shoulder.  She would like to consider left shoulder surgery.  We will need updated imaging and MRI at that time.        1/26/2024:  LEFT SHOULDER PAIN  Left shoulder has been bothering her for many years.  I personally reviewed her MRI of the left shoulder in the [facility] from 7/13/2020 she is supraspinatus tendinopathy, partial tearing, degenerative changes the labrum, degenerative biceps findings and AC joint arthritic findings.    She has had a history of significant anterior shoulder pain consistent with biceps pathology and today has more notable left AC joint pain.    Working from home      Subjective   Subjective      Review of Systems   Constitutional: Negative.  Negative for chills, fatigue and fever.   HENT: Negative.  Negative for congestion and dental problem.    Eyes: Negative.  Negative for blurred vision.   Respiratory: Negative.  Negative for shortness of breath.    Cardiovascular: Negative.  Negative for leg swelling.   Gastrointestinal: Negative.  Negative for abdominal pain.   Endocrine: Negative.  Negative for polyuria.   Genitourinary: Negative.  Negative for difficulty urinating.   Musculoskeletal:  Positive for arthralgias.   Skin: Negative.    Allergic/Immunologic: Negative.    Neurological: Negative.    Hematological: Negative.  Negative for adenopathy.   Psychiatric/Behavioral:  Negative.  Negative for behavioral problems.         Past Medical History:   Past Medical History:   Diagnosis Date    Arthritis     Arthritis of back     Arthritis of neck     Breast cancer     2006 LEFT     Cervical disc disorder     Drug therapy     Hip arthrosis     Knee swelling     Lumbosacral disc disease     Lung nodule     Neck strain     Periarthritis of shoulder     Radiation     Rotator cuff syndrome        Past Surgical History:   Past Surgical History:   Procedure Laterality Date    BREAST BIOPSY       SECTION      HYSTERECTOMY      MASTECTOMY      BILATERAL    OTHER SURGICAL HISTORY  2018    stones removed from submandibular gland     TRIGGER POINT INJECTION  Bilateral shoulders       Family History:   Family History   Problem Relation Age of Onset    COPD Mother     Diabetes Mother     No Known Problems Father     Multiple sclerosis Sister     No Known Problems Brother     ADD / ADHD Daughter     Drug abuse Daughter     Bipolar disorder Daughter     No Known Problems Son     Stroke Maternal Grandmother     No Known Problems Paternal Grandmother     No Known Problems Maternal Aunt     No Known Problems Paternal Aunt        Social History:   Social History     Socioeconomic History    Marital status:     Number of children: 2   Tobacco Use    Smoking status: Former     Packs/day: 0.25     Years: 40.00     Additional pack years: 0.00     Total pack years: 10.00     Types: Cigarettes     Quit date: 3/1/2021     Years since quittin.9     Passive exposure: Past    Smokeless tobacco: Never    Tobacco comments:     occasional vape   Vaping Use    Vaping Use: Former    Substances: Nicotine, Flavoring    Devices: Disposable, Pre-filled pod    Passive vaping exposure: Yes   Substance and Sexual Activity    Alcohol use: Not Currently    Drug use: Not Currently     Types: Marijuana    Sexual activity: Not Currently       Medications:   Current Outpatient Medications:     acetaminophen  "(TYLENOL) 500 MG tablet, Take 1 tablet by mouth Every 6 (Six) Hours As Needed for Mild Pain. Pt states she takes 2 in the AM & 1 in the PM, Disp: , Rfl:     acyclovir (ZOVIRAX) 400 MG tablet, Take 1 tablet by mouth 2 (two) times a day., Disp: 180 tablet, Rfl: 1    venlafaxine XR (EFFEXOR-XR) 150 MG 24 hr capsule, Take 1 capsule by mouth Daily., Disp: 90 capsule, Rfl: 0    Allergies:   Allergies   Allergen Reactions    Penicillins Anaphylaxis    Codeine Nausea Only    Reglan [Metoclopramide] Unknown (See Comments)     neurological    Talwin [Pentazocine] Other (See Comments)     Oral swelling.        The following portions of the patient's history were reviewed and updated as appropriate: allergies, current medications, past family history, past medical history, past social history, past surgical history and problem list.        Objective    Objective      Vital Signs:   Vitals:    01/26/24 1131   BP: 118/78   Weight: 67.3 kg (148 lb 6.4 oz)   Height: 158 cm (62.21\")       Ortho Exam:  LEFT SHOULDER  General: no acute distress, comfortable  Vitals reviewed in chart    Musculoskeletal Exam    SIDE: LEFT SHOULDER  Shoulder Exam:    Tenderness: biceps tendon, +AC joint    Range of motion measurements (degrees)  Forward flexion/Abduction/External rotation at side/ER at 90/IR at 90/IR position  Active: 140/140/45/80/80  Passive: same    Painful arc of motion: 90  No evidence of septic joint  Pain with forward flexion and abduction greater: 90  Impingement testing Neer's test - positive/painful  Impingement testing Hawkin's test - positive/painful    Rotator Cuff Testing:  Tenderness to palpation at rotator cuff - negative  Rotator cuff testing Mane's test - pain but primarily anterior biceps pain    Scapular dyskinesis - present, abnormal scapular motion    Instability Testing:  Apprehension - negative    Long head of the biceps testing:  Porter's test for biceps - positive  Bicipital groove tenderness to palpation - " positive  Speed's test  - positive  Tenderness to palpation at biceps tendon - positive    AC Joint:  AC joint tenderness to palpation - Positive      Results Review:  Imaging Results (Last 24 Hours)       Procedure Component Value Units Date/Time    XR Shoulder 2+ View Left [[id number]] Resulted: 01/26/24 1212     Updated: 01/26/24 1214    Narrative:      Imaging: shoulder x-rays 2 views - AP and axillary x-ray views    Side: LEFT SHOULDER    Indication for shoulder x-ray 2 views: shoulder pain    Comparison: prior xray and MRI comparison views available    Findings: No acute bony pathology. No superior humeral head migration.    The humeral head remains centered in the glenohumeral joint. No evidence   of calcific tendonitis. Baseline degenerative AC joint changes.    Evidence of prior axillary dissection with vessel clips and known history   of breast cancer/mastectomy.      I personally reviewed the above x-rays.            Procedures            Assessment / Plan      Assessment/Plan:   Problem List Items Addressed This Visit          Musculoskeletal and Injuries    Bursitis of left shoulder    Impingement syndrome of left shoulder    Biceps tendinitis of left upper extremity - Primary    Relevant Orders    XR Shoulder 2+ View Left (Completed)    Injury of left rotator cuff    Nontraumatic incomplete tear of left rotator cuff       MRI of the shoulder is recommended.  Indication: suspected rotator cuff tear  The MRI is critical to evaluate for rotator cuff tearing and will help with possible surgical planning.  LEFT SHOULDER -- known partial rotator cuff tearing from 7/13/2020 --updated MRI is indicated for possible surgical intervention.    Patient is strongly considering left arthroscopic biceps tenodesis and left arthroscopic distal clavicle resection pending updated MRI findings.    Follow Up: After left shoulder MRI.      Benedict Price MD, FAAOS  Orthopedic Surgeon  Fellowship Trained Shoulder and Elbow  Surgeon  [facility]  Orthopedics and Sports Medicine  [address], [address]  [place] [address]    01/26/24  12:18 EST

## 2024-02-16 DIAGNOSIS — M75.112 NONTRAUMATIC INCOMPLETE TEAR OF LEFT ROTATOR CUFF: ICD-10-CM

## 2024-02-16 DIAGNOSIS — S46.002A INJURY OF LEFT ROTATOR CUFF, INITIAL ENCOUNTER: ICD-10-CM

## 2024-02-16 DIAGNOSIS — M75.52 BURSITIS OF LEFT SHOULDER: ICD-10-CM

## 2024-02-16 DIAGNOSIS — M75.22 BICEPS TENDINITIS OF LEFT UPPER EXTREMITY: ICD-10-CM

## 2024-02-16 DIAGNOSIS — M75.42 IMPINGEMENT SYNDROME OF LEFT SHOULDER: ICD-10-CM

## 2024-02-20 ENCOUNTER — OFFICE VISIT (OUTPATIENT)
Dept: ORTHOPEDIC SURGERY | Facility: CLINIC | Age: 64
End: 2024-02-20
Payer: COMMERCIAL

## 2024-02-20 VITALS
BODY MASS INDEX: 27.23 KG/M2 | DIASTOLIC BLOOD PRESSURE: 76 MMHG | SYSTOLIC BLOOD PRESSURE: 124 MMHG | WEIGHT: 148 LBS | HEIGHT: 62 IN

## 2024-02-20 DIAGNOSIS — M75.42 IMPINGEMENT SYNDROME OF LEFT SHOULDER: ICD-10-CM

## 2024-02-20 DIAGNOSIS — S43.432A SUPERIOR GLENOID LABRUM LESION OF LEFT SHOULDER, INITIAL ENCOUNTER: ICD-10-CM

## 2024-02-20 DIAGNOSIS — M75.112 NONTRAUMATIC INCOMPLETE TEAR OF LEFT ROTATOR CUFF: ICD-10-CM

## 2024-02-20 DIAGNOSIS — M75.52 BURSITIS OF LEFT SHOULDER: ICD-10-CM

## 2024-02-20 DIAGNOSIS — S46.002A INJURY OF LEFT ROTATOR CUFF, INITIAL ENCOUNTER: ICD-10-CM

## 2024-02-20 DIAGNOSIS — M75.22 BICEPS TENDINITIS OF LEFT UPPER EXTREMITY: Primary | ICD-10-CM

## 2024-02-20 PROCEDURE — 99214 OFFICE O/P EST MOD 30 MIN: CPT | Performed by: ORTHOPAEDIC SURGERY

## 2024-02-20 NOTE — PROGRESS NOTES
[facility] Follow Up       Office Follow Up Visit       Patient Name: Yeni Olivo    Chief Complaint:   Chief Complaint   Patient presents with    Follow-up     1 month follow up -- Biceps tendinitis of left upper extremity -- MRI completed 2/14/24       Referring Physician: No ref. provider found    History of Present Illness:   It has been 1  month(s) since Yeni Olivo's last visit. Yeni Olivo returns to clinic today for F/U: follow-up of leftBody Part: shoulderReason: pain. The issue has been ongoing for 10 year(s). Yeni Olivo rates HIS/HER: herpain at 5/10 on the pain scale. Previous/current treatments: NSAIDS and physical therapy. Current symptoms:Symptoms: same as prior visit. The pain is worse with sleeping, working, leisure, and lying on affected side; Nothing improves the pain. Overall, he/she: sheis doing about the same -- still has anterior biceps pain. I have reviewed the patient's history of present illness as noted/entered above.    I have reviewed the patient's past medical history, surgical history, social history, family history, medications, and allergies as noted in the electronic medical record and as noted/entered.  I have reviewed the patient's review of systems as noted/enter and updated as noted in the patient's HPI.      LEFT SHOULDER        5/4/2023:  Bilateral shoulder pain the right responded to the injection, the left pain persists.  She has had injections for many years prior was under the care of Dr. Virk.  She is responded to steroid injections with many injections over the years but has done incredibly well.     Bilateral subacromial injections 4/20/2023; RIGHT doing well     No evidence of infection no erythema no evidence of septic joint.  Exacerbation following a prior injection.  Counseled on treatment options.     New baby grandson, Malcom. 1 week old     The pain  today in the left shoulder is more anterior biceps pain.  I do think it is reasonable for an injection for that and conservative course for that.  Did  on possible MRI        10/26/2023:  Bilateral shoulder pain, left worse than right     Overall the left shoulder feels much worse.  Desires bilateral biceps tendon sheath injections.  She notes significant improvement even prior to departure from the clinic today.        She would like to wait until after the new year to pursue updated x-rays and possible MRI of the left shoulder.  She would like to consider left shoulder surgery.  We will need updated imaging and MRI at that time.           1/26/2024:  LEFT SHOULDER PAIN  Left shoulder has been bothering her for many years.  I personally reviewed her MRI of the left shoulder in the [facility] from 7/13/2020 she is supraspinatus tendinopathy, partial tearing, degenerative changes the labrum, degenerative biceps findings and AC joint arthritic findings.     She has had a history of significant anterior shoulder pain consistent with biceps pathology and today has more notable left AC joint pain.     Working from home      2/20/2024:  LEFT SHOULDER  MRI Proscan completed 2/14/2024 -- LEFT SHOULDER tendinopathy, RC intact, baseline degenerative changes    Lost her  4 years ago.  They were  in [place] on 2/14    Significant anterior biceps, she is interested in discussing possible operative intervention for continued biceps pain and biceps pathology despite prolonged conservative course under our orthopedic team care including my partner dating back to at least 2018.  Conservative course over the last 6 years      Subjective   Subjective      Review of Systems   Constitutional: Negative.  Negative for chills, fatigue and fever.   HENT: Negative.  Negative for congestion and dental problem.    Eyes: Negative.  Negative for blurred vision.   Respiratory: Negative.  Negative for shortness of  breath.    Cardiovascular: Negative.  Negative for leg swelling.   Gastrointestinal: Negative.  Negative for abdominal pain.   Endocrine: Negative.  Negative for polyuria.   Genitourinary: Negative.  Negative for difficulty urinating.   Musculoskeletal:  Positive for arthralgias.   Skin: Negative.    Allergic/Immunologic: Negative.    Neurological: Negative.    Hematological: Negative.  Negative for adenopathy.   Psychiatric/Behavioral: Negative.  Negative for behavioral problems.         Past Medical History:   Past Medical History:   Diagnosis Date    Arthritis     Arthritis of back     Arthritis of neck     Breast cancer     2006 LEFT     Cervical disc disorder     Drug therapy     Hip arthrosis     Knee swelling     Lumbosacral disc disease     Lung nodule     Neck strain     Periarthritis of shoulder     Radiation     Rotator cuff syndrome        Past Surgical History:   Past Surgical History:   Procedure Laterality Date    BREAST BIOPSY       SECTION      HYSTERECTOMY      MASTECTOMY      BILATERAL    OTHER SURGICAL HISTORY  2018    stones removed from submandibular gland     TRIGGER POINT INJECTION  Bilateral shoulders       Family History:   Family History   Problem Relation Age of Onset    COPD Mother     Diabetes Mother     No Known Problems Father     Multiple sclerosis Sister     No Known Problems Brother     ADD / ADHD Daughter     Drug abuse Daughter     Bipolar disorder Daughter     No Known Problems Son     Stroke Maternal Grandmother     No Known Problems Paternal Grandmother     No Known Problems Maternal Aunt     No Known Problems Paternal Aunt        Social History:   Social History     Socioeconomic History    Marital status:     Number of children: 2   Tobacco Use    Smoking status: Former     Packs/day: 0.25     Years: 40.00     Additional pack years: 0.00     Total pack years: 10.00     Types: Cigarettes     Quit date: 3/1/2021     Years since quittin.9     Passive  "exposure: Past    Smokeless tobacco: Never    Tobacco comments:     occasional vape   Vaping Use    Vaping Use: Former    Substances: Nicotine, Flavoring    Devices: Disposable, Pre-filled pod    Passive vaping exposure: Yes   Substance and Sexual Activity    Alcohol use: Not Currently    Drug use: Not Currently     Types: Marijuana    Sexual activity: Not Currently       Medications:   Current Outpatient Medications:     acetaminophen (TYLENOL) 500 MG tablet, Take 1 tablet by mouth Every 6 (Six) Hours As Needed for Mild Pain. Pt states she takes 2 in the AM & 1 in the PM, Disp: , Rfl:     acyclovir (ZOVIRAX) 400 MG tablet, Take 1 tablet by mouth 2 (two) times a day., Disp: 180 tablet, Rfl: 1    venlafaxine XR (EFFEXOR-XR) 150 MG 24 hr capsule, Take 1 capsule by mouth Daily., Disp: 90 capsule, Rfl: 0    Allergies:   Allergies   Allergen Reactions    Penicillins Anaphylaxis    Codeine Nausea Only    Reglan [Metoclopramide] Unknown (See Comments)     neurological    Talwin [Pentazocine] Other (See Comments)     Oral swelling.        The following portions of the patient's history were reviewed and updated as appropriate: allergies, current medications, past family history, past medical history, past social history, past surgical history and problem list.        Objective    Objective      Vital Signs:   Vitals:    02/20/24 1327   BP: 124/76   Weight: 67.1 kg (148 lb)   Height: 157.5 cm (62\")       Ortho Exam:  LEFT SHOULDER  Persistent anterior biceps pain positive Neer positive Mcintyer.  Positive O'Briens about the biceps tendon tenderness palpation about the biceps.  Positive speeds testing positive uppercut testing positive liftoff testing indicative of anterior biceps pain.  Pain with dynamic labral shear testing anterior about the biceps.  Good rotator cuff strength today.  Negative AC joint pain.    Results Review:  Imaging Results (Last 24 Hours)       ** No results found for the last 24 hours. **      "                   I personally reviewed the imaging above.  Her partial rotator cuff tearing on my personal review of her 2020 MRI still shows partial rotator cuff tearing, degenerative SLAP tearing.    EXAMINATION: MRI SHOULDER, LEFT, WO CONTRAST-07/13/2020:     INDICATION: Shoulder pain, prior x-ray, rotator cuff tear / impingement  suspected; M75.41-Impingement syndrome of right shoulder;  M75.42-Impingement syndrome of left shoulder.      TECHNIQUE: Multiplanar MRI of the left shoulder without intravenous  contrast.     COMPARISON: MRI dated 04/20/2016.     FINDINGS: The osseous structures demonstrate moderate DJD of the AC  joint without significant mass effect on the underlying myotendinous  junction supraspinatus. Cystic degenerative changes at the greater  tuberosity of humerus without acute fracture or aggressive bone marrow  signal findings otherwise noted.     Rotator cuff: Moderate hyperintense T2 signal involving the supraspinous  fibers of a posterior predominance articular sided partial-thickness  tearing with mild fatty infiltration, however, no significant muscle  belly atrophy. Infraspinous has minimal signal of likely tendinopathy  without retraction or significant tearing. Subscapularis intact. Long  head of the biceps well seated within the bicipital groove. Teres minor  intact. SLAP tearing of the anterior-superior glenoid labrum as well as  chronic tearing of the posterior labrum again noted from prior  comparison without significant interval progression or periosteal  stripping/fragmentation. Fluid signal within the rotator interval along  with at least mild inferior capsular thickening, however, quadrilateral  space and axillary pouch otherwise unremarkable with preserved  perineural fat.     IMPRESSION:  1. Compared to 2016 examination, there has been at least mild interval  progression in signal thickness tearing supraspinatus or acute on  chronic findings of tearing without retraction.      2. Labral tearing of the anterior and posterior glenoid labrum as  detailed above in similar appearance to 2016 exam where this was  identified as well.     3. Moderate DJD AC joint interval with trace subacromial/subdeltoid  fluid.     4. Rotator cuff fluid signal is increased from 2016 exam along with at  least mild inferior capsular thickening, stable to slightly increased  from prior comparison 2016, concerning for adhesive capsulitis  configuration in the appropriate setting.      D:  07/13/2020  E:  07/14/2020     This report was finalized on 7/14/2020 6:57 PM by Dr. José Antonio Abreu.     I reviewed the July 2020 MRI confirming degenerative labral tearing.      Procedures            Assessment / Plan      Assessment/Plan:   Problem List Items Addressed This Visit          Musculoskeletal and Injuries    Bursitis of left shoulder    Relevant Orders    External Facility Surgical/Procedural Request    Impingement syndrome of left shoulder    Relevant Orders    External Facility Surgical/Procedural Request    Biceps tendinitis of left upper extremity - Primary    Relevant Orders    External Facility Surgical/Procedural Request    Injury of left rotator cuff    Relevant Orders    External Facility Surgical/Procedural Request    Nontraumatic incomplete tear of left rotator cuff    Relevant Orders    External Facility Surgical/Procedural Request    Superior glenoid labrum lesion of left shoulder    Relevant Orders    External Facility Surgical/Procedural Request       LEFT SHOULDER    LEFT Arthroscopic biceps tenodesis CPT code 45134  Prior MRI (2020) more clearly highlight partial rotator cuff tearing, SLAP2 tearing and biceps tendon involvement    I counseled on the risks and benefits of arthroscopic surgery versus continued conservative course.  She has had conservative course under the direct care of our orthopedic team with Dr. Virk dating back to 2018.  She has had 6 years of excellent conservative course  including physical therapy/MD guided/orthopedic guided, multiple steroid injections including biceps tendon sheath injection with excellent relief but short-lived at last injection.  I think she will benefit from arthroscopic evaluation and arthroscopic biceps tenodesis.      [facility]  Single shot block  LEFT BTD    Follow Up: LEFT SHOULDER SURGERY      Benedict Price MD, FAAOS  Orthopedic Surgeon  Fellowship Trained Shoulder and Elbow Surgeon  [facility]  Orthopedics and Sports Medicine  [address] [place] [address], [address]  [place] [address]    02/20/24  17:16 EST

## 2024-03-11 ENCOUNTER — OUTSIDE FACILITY SERVICE (OUTPATIENT)
Dept: ORTHOPEDIC SURGERY | Facility: CLINIC | Age: 64
End: 2024-03-11
Payer: COMMERCIAL

## 2024-03-11 ENCOUNTER — DOCUMENTATION (OUTPATIENT)
Dept: ORTHOPEDIC SURGERY | Facility: CLINIC | Age: 64
End: 2024-03-11

## 2024-03-11 DIAGNOSIS — M19.012 PRIMARY LOCALIZED OSTEOARTHROSIS OF LEFT SHOULDER REGION: ICD-10-CM

## 2024-03-11 DIAGNOSIS — Z98.890 STATUS POST ARTHROSCOPY OF LEFT SHOULDER: Primary | ICD-10-CM

## 2024-03-11 RX ORDER — HYDROCODONE BITARTRATE AND ACETAMINOPHEN 10; 325 MG/1; MG/1
1 TABLET ORAL EVERY 4 HOURS PRN
Qty: 42 TABLET | Refills: 0 | Status: SHIPPED | OUTPATIENT
Start: 2024-03-11 | End: 2024-03-18

## 2024-03-11 RX ORDER — ONDANSETRON 4 MG/1
4 TABLET, FILM COATED ORAL EVERY 6 HOURS PRN
Qty: 30 TABLET | Refills: 1 | Status: SHIPPED | OUTPATIENT
Start: 2024-03-11 | End: 2024-03-19

## 2024-03-11 RX ORDER — METHOCARBAMOL 500 MG/1
500 TABLET, FILM COATED ORAL 3 TIMES DAILY PRN
Qty: 40 TABLET | Refills: 1 | Status: SHIPPED | OUTPATIENT
Start: 2024-03-11 | End: 2024-03-25

## 2024-03-11 NOTE — PROGRESS NOTES
Operative Report     Side/SHOULDER: LEFT SHOULDER    LOCATION:   [facility]  [address] [place] [address]    [facility] OPERATIVE REPORT       Surgeon: Benedict Price MD     Assistant: HARVINDER Baca     The skilled assistance of the above noted first assistant was necessary during this complex surgical procedure.  The surgical assistant assisted with every aspect of the operation including, but not limited to, proper and safe positioning of the patient, obtaining adequate surgical exposure, manipulation of surgical instruments, suture management, surgical knot tying when necessary, the continual process of hemostasis during the procedure itself in addition to surgical wound closure and removal of the patient from the operating table and returning the patient back to the [facility].  The assistance of the surgical assistant allowed me to perform the most sensitive and technical potions of this operation using 2 hands, thus enhancing efficiency and patient safety.  This would not be possible without the help of a skilled assistant familiar with the procedure and capable of safely performing the aforementioned tasks.     Date of Surgery:  SIDE: LEFT shoulder  Preoperative Diagnosis:  1.     Biceps tendinopathy SLAP 2 tearing, extra-articular partial biceps tearing- treated with arthroscopic biceps tenodesis - CPT 61798  2.    Early glenohumeral joint arthritis with cartilage loss/chondromalacia on the humeral head and on the glenoid, degenerative labral tearing, partial bursal sided rotator cuff tearing-treated with extensive debridement CPT 16489  3.     Shoulder impingement syndrome, partial CA ligament tearing- treated with arthroscopic subacromial decompression with acromioplasty - CPT 96692     Postoperative Diagnosis:  1.     SAME as preoperative diagnoses --glenohumeral joint arthritis was far more advanced when viewed  arthroscopically    Additionally she did have some stiffness of the left shoulder joint that improved with examination under anesthesia and improved with extensive debridement.  She had extensive changes within the glenohumeral joint and in the subacromial space.  Shoulder remained stable throughout.     Procedure:  1.     Arthroscopic biceps tenodesis  - CPT 27005  2.     Arthroscopic extensive debridement - CPT 62419  3.     Arthroscopic subacromial decompression with acromioplasty - CPT 91947        Admission status: elective outpatient surgery     COVID-19 Statement: The patient understands that the surgery is elective surgery.  Patient is aware of the ongoing global pandemic COVID-19.      Complications: None     EBL: 10mL     Specimen: NONE     Anesthesia: general anesthesia     Block: regional interscalene block with single shot per anesthesia     Antibiotics: weight based IV antibiotics infused prior to incision     Time out: Time out was called and the patient, side, site, and intended procedures were confirmed.     Counts: Needle and sponge counts were correct prior to closure.     DVT prophylaxis: The patient will be weight bearing as tolerated on bilateral lower extremities postoperatively with no additional chemical DVT prophylaxis indicated.     Marked: The patient was marked with an indelible marker in the preoperative holding area confirming the correct side and site.     History & Physical:   A history and physical examination was completed and updated in the preoperative holding area.     Consent: The patient signed the consent form for surgery with an understanding of the risks and benefits as outlined in the clinic and in the preoperative holding area.     Risks and Benefits: Specific risks and benefits discussed included - pain, bleeding, infection, injury to nerves or blood vessels, fracture, stiffness, failure to heal, revision surgery, deformity of biceps or asymmetry, complications of the  block, anesthetic complications, and medical complications associated with surgery.       Indications for surgery:  The patient has history, physical examination, and radiographic findings confirming the diagnoses.  The patient has persistent pain and functional loss unresponsive to non-operative treatment consisting of selective rest/activity modification, medications, and exercises.      Impingement syndrome - the patient had history and clinical exam findings consistent with shoulder impingement syndrome.  Additionally, the patient had subacromial bursitis which was encountered during the arthroscopic shoulder procedure.  Subacromial decompression with minimal acromioplasty was indicated as part of the treatment of impingement syndrome.  Partial CA ligament tearing was noted    Additional glenohumeral joint arthritic changes and stiffness with examination and anesthesia were noted.        Operative Findings:  Rotator Cuff Tissue Quality: Bursal sided fraying and a lot of subacromial changes but no full-thickness tearing was noted.       Bone Quality: Solid fixation with 4.5 mm knotless anchor for biceps tenodesis     Labrum: Degenerative labral tearing     Biceps: tendinopathy and synovitis, SLAP 2 tearing and extra-articular partial tearing of the biceps     Biceps Tenodesis Construct:  Suprapectoral biceps tenodesis in the bicipital groove     Biceps Tendon Implant:  [employer] 4.5 mm footprint, knotless fixation with ultra braid suture with multiple passes around and through the biceps tendon.  I initially looked at a 2.8 mm microraptor knotless anchor but this would not provide the type of fixation that we were going for and I had solid fixation using the  hole and the all for the 4.5 mm footprint knotless anchor with excellent tensioning of the long head of the biceps and excellent fixation.     Humeral Head: Arthritic changes grade 4 on the humeral and the glenoid side  Glenoid: Arthritic changes  grade 4 on the humeral and the glenoid side     Contracture: Contracture was noted particularly with forward flexion and abduction with examination under anesthesia that did improve following extensive debridement  Pathological laxity: Negative, shoulder remained stable throughout     Procedure in detail:  Regional interscalene block was completed in the preoperative area by the anesthesia team.  The patient was supine on the operative table and the anesthesia team initiated general anesthesia.  The patient was placed in a modified beach chair position with the neck secured in neutral alignment and all bony prominences were well padded.     The shoulder was assessed with an examination under anesthesia.  Stiffness was noted and did improve with examination under anesthesia and following extensive debridement.     The operative extremity was cleaned with alcohol and then the extremity was prepped and draped in the standard fashion.  The surgical arm was placed into a well-padded arm chacon. A standard posterior portal was created with an incision made 1 cm inferior 1 cm medial to the posterolateral acromial corner.  A blunt metal trocar and cannula were inserted into the glenohumeral joint.  The arthroscopic pump was connected and the above findings and diagnoses were noted as part of the diagnostic shoulder arthroscopy.       A spinal needle was used to localize the anterior portal position in the rotator interval. A 5.5mm plastic cannula and trocar were inserted followed by a 4.5mm shaver/cautery device.  The shaver was used for debridement in the glenohumeral joint.  Extensive debridement was necessary within the glenohumeral joint for chondroplasty on the glenoid side, chondroplasty on the humeral side, debridement of scar tissue within the rotator interval, debridement of degenerative labral tearing, debridement of bursal sided fraying of the rotator cuff.    Arthroscopic scissors were used to perform biceps  tenotomy of the pathologic biceps tendon prior to subsequent biceps tenodesis.  The remaining intra-articular portion of the biceps tendon was debrided using the shaver/cautery device.     The arthroscope and metal cannula were then removed in order to transition to the subacromial space.  The blunt metal trocar and cannula were inserted into the subacromial space and the lateral portal site identified with a spinal needle.  An 8 mm plastic cannula and trocar were inserted.  I turned my attention to the arthroscopic subacromial decompression with acromioplasty. Bursitis was noted in the subacromial space and impacted visualization of the rotator cuff.  The 4.5mm shaver/cautery device was used to clear the subacromial space until the acromion could be identified and bursal resection was completed to allow rotator cuff visualization.  The shaver was used to remove fibrous tissue from the acromial undersurface until the anterolateral and anterior medial corners of the acromion were clearly identified.  The coracoacromial ligament was not released but CA ligament tearing was noted and debrided as part of the subacromial decompression.  The shaver was used to perform a minimal acromioplasty.  The shaver/cautery device was used to perform the subacromial decompression with minimal acromioplasty.    The rotator cuff was closely inspected and no indication for rotator cuff repair.  Bursal sided fraying of the rotator cuff was noted and debrided.  No full-thickness tearing noted.     I turned my attention to the arthroscopic biceps tenodesis. The arm was placed in a slightly external rotator position and the elbow flexed and shoulder forward flexed into a biceps tendon repair position.  The biceps tendon repair position in achieved in order to try to maintain proper biceps tendon length-tension relationship during the repair.  The biceps sheath was opened using the shaver/cautery device and the pathologic biceps tendon was  visualized.  I passed multiple suture passes using the passing device with the ultra braid suture through and around the biceps tendon.  Solid fixation of the suture was noted.  I placed a  hole for the 2.8 mm microraptor anchor but better fixation so went with the 4.5 mm footprint peek anchor with excellent tensioning and excellent stable fixation of the anchor and biceps tendon fixation which was dynamically tested.  Solid fixation was noted.  The arthroscopic biceps tenodesis was completed and the repair was noted to be dynamically stable with a solid repair.      Arthroscopic fluid and instrumentation were removed and the incisions were closed with nylon suture and steri-strips were placed over the incisions.  A sterile dressing was applied followed by a neutral rotation sling.  The patient tolerated the procedure well and was transported to the recovery room in satisfactory condition.        Postoperative Plan:  Neutral rotation sling  Non-weight bearing  No biceps loading  Pendulums, elbow, wrist, and hand exercises encouraged  Clinic follow-up appointment scheduled for 2 weeks after surgery       Biceps Tenodesis - POSTOPERATIVE PLAN:  Follow-up in the office in 2 weeks with 1 view of the operative shoulder at that time  Sutures: Nylon sutures to come out in 2 weeks  DVT prophylaxis: No additional chemical DVT prophylaxis indicated  Weightbearing: Nonweightbearing on operative extremity  Sling for 3 to 4 weeks following the surgery  Physical therapy: Formal outpatient physical therapy will be provided at the 2-week appointment in the office.  Biceps tenodesis protocol    Electronically signed by Benedict Price MD, 03/11/24, 11:44 AM EDT.

## 2024-03-29 ENCOUNTER — OFFICE VISIT (OUTPATIENT)
Dept: ORTHOPEDIC SURGERY | Facility: CLINIC | Age: 64
End: 2024-03-29
Payer: COMMERCIAL

## 2024-03-29 VITALS — TEMPERATURE: 97.5 F

## 2024-03-29 DIAGNOSIS — Z98.890 STATUS POST ARTHROSCOPY OF LEFT SHOULDER: Primary | ICD-10-CM

## 2024-03-29 PROCEDURE — 99024 POSTOP FOLLOW-UP VISIT: CPT

## 2024-03-29 RX ORDER — HYDROCODONE BITARTRATE AND ACETAMINOPHEN 10; 325 MG/1; MG/1
1 TABLET ORAL EVERY 6 HOURS PRN
COMMUNITY

## 2024-03-29 NOTE — PROGRESS NOTES
[facility] [place] [facility] [place]       Office Follow Up Visit       Patient Name: Yeni Olivo    Chief Complaint:   Chief Complaint   Patient presents with    Post-op     2.5 weeks S/P Arthroscopic biceps tenodesis, Arthroscopic extensive debridement, Arthroscopic subacromial decompression with acromioplasty - Left (3/11/24)       Referring Physician: No ref. provider found    History of Present Illness:   Yeni Olivo returns to clinic today for 2 week post-op visit s/p LEFT arthroscopic biceps tenodesis, extensive debridement and subacromial decompression with Dr. Price. She reports to be doing well. Pain has been controlled. She has been wearing her sling as instructed.     RIGHT shoulder has been bothersome. Last injection right biceps tendon sheath 10/26/23    Procedure: LEFT SHOULDER   1.     Arthroscopic biceps tenodesis  - CPT 10738  2.     Arthroscopic extensive debridement - CPT 95690  3.     Arthroscopic subacromial decompression with acromioplasty - CPT 95822    Subjective     Review of Systems   Constitutional: Negative.  Negative for chills, fatigue and fever.   HENT: Negative.  Negative for congestion and dental problem.    Eyes: Negative.  Negative for blurred vision.   Respiratory: Negative.  Negative for shortness of breath.    Cardiovascular: Negative.  Negative for leg swelling.   Gastrointestinal: Negative.  Negative for abdominal pain.   Endocrine: Negative.  Negative for polyuria.   Genitourinary: Negative.  Negative for difficulty urinating.   Musculoskeletal:  Positive for arthralgias.   Skin: Negative.    Allergic/Immunologic: Negative.    Neurological: Negative.    Hematological: Negative.  Negative for adenopathy.   Psychiatric/Behavioral: Negative.  Negative for behavioral problems.         I have reviewed and updated the following portions of the patient's history and review of systems: allergies,  current medications, past family history, past medical history, past social history, past surgical history and problem list.    Medications:   Current Outpatient Medications:     acetaminophen (TYLENOL) 500 MG tablet, Take 1 tablet by mouth Every 6 (Six) Hours As Needed for Mild Pain. Pt states she takes 2 in the AM & 1 in the PM, Disp: , Rfl:     acyclovir (ZOVIRAX) 400 MG tablet, Take 1 tablet by mouth 2 (two) times a day., Disp: 180 tablet, Rfl: 1    HYDROcodone-acetaminophen (NORCO)  MG per tablet, Take 1 tablet by mouth Every 6 (Six) Hours As Needed for Moderate Pain., Disp: , Rfl:     venlafaxine XR (EFFEXOR-XR) 150 MG 24 hr capsule, Take 1 capsule by mouth Daily., Disp: 90 capsule, Rfl: 0    Allergies:   Allergies   Allergen Reactions    Penicillins Anaphylaxis    Codeine Nausea Only    Reglan [Metoclopramide] Unknown (See Comments)     neurological    Talwin [Pentazocine] Other (See Comments)     Oral swelling.          Objective      Vital Signs:   Vitals:    03/29/24 1106   Temp: 97.5 °F (36.4 °C)       Ortho Exam:  General: comfortable  Vascular: 2+ radial pulse  Neurologic: sensation to light touch is intact distally, elbow flexion/elbow extension/wrist flexion/wrist extension/hand intrinsics intact, able to fire deltoid; no residual defects noted from the block  Dermatologic: surgical incisions are well appearing, with no drainage or surrounding erythema; no signs or symptoms of infection or DVT      Results Review:  XR Shoulder 1 View Left  Imaging: shoulder x-rays 1 view - AP x-ray views    Side: LEFT SHOULDER    Indication for shoulder x-ray 1 view: shoulder pain, postop evaluation   following surgery    Comparison: the current xray was compared to preoperative imaging and   indicates expected postoperative changes.    Findings: No acute bony pathology. Well appearing and well positioned   compared to preoperative imaging.  Located and no fracture noted.    I personally reviewed the above  x-rays.         Assessment / Plan      Assessment:   Diagnoses and all orders for this visit:    1. Status post arthroscopy of left shoulder (Primary)  -     XR Shoulder 1 View Left  -     Ambulatory Referral to Physical Therapy Evaluate and treat, Ortho, POST OP        Quality Metrics:   BMI:   BMI is >= 25 and <30. (Overweight) The following options were offered after discussion;: weight loss educational material (shared in after visit summary)       Tobacco:   Yeni Olivo  reports that she quit smoking about 3 years ago. Her smoking use included cigarettes. She started smoking about 43 years ago. She has a 10 pack-year smoking history. She has been exposed to tobacco smoke. She has never used smokeless tobacco.         Plan:  Doing well 2.5 weeks s/p left shoulder biceps tenodesis, extensive debridement, SAD.   Recommend over the counter anti-inflammatories for pain and/or swelling. Do not recommend right shoulder injection at this time due to risks with healing of left shoulder. May reconsider next visit.  Patient will start to wean out of sling use over the next week. Remain nonweightbearing on left side no more than 2 pounds.  To start with physical therapy.  I have given a PT prescription as well as 2 copies of the protocol.     We reviewed the intraoperative photographs together.  Sutures out today.       History, diagnosis and treatment plan discussed with Dr. Price.      Follow Up:   2 months    Federica Bell PA-C  [facility] [place] [facility]    Dictated using Dragon Speech Recognition.

## 2024-06-28 ENCOUNTER — OFFICE VISIT (OUTPATIENT)
Dept: ORTHOPEDIC SURGERY | Facility: CLINIC | Age: 64
End: 2024-06-28
Payer: COMMERCIAL

## 2024-06-28 VITALS
DIASTOLIC BLOOD PRESSURE: 78 MMHG | SYSTOLIC BLOOD PRESSURE: 120 MMHG | WEIGHT: 144.4 LBS | BODY MASS INDEX: 26.57 KG/M2 | HEIGHT: 62 IN

## 2024-06-28 DIAGNOSIS — S46.002A INJURY OF LEFT ROTATOR CUFF, INITIAL ENCOUNTER: ICD-10-CM

## 2024-06-28 DIAGNOSIS — M75.51 BURSITIS OF RIGHT SHOULDER: ICD-10-CM

## 2024-06-28 DIAGNOSIS — M75.21 BICEPS TENDINITIS OF RIGHT UPPER EXTREMITY: ICD-10-CM

## 2024-06-28 DIAGNOSIS — M19.012 PRIMARY LOCALIZED OSTEOARTHROSIS OF LEFT SHOULDER REGION: ICD-10-CM

## 2024-06-28 DIAGNOSIS — M75.22 BICEPS TENDINITIS OF LEFT UPPER EXTREMITY: ICD-10-CM

## 2024-06-28 DIAGNOSIS — M75.112 NONTRAUMATIC INCOMPLETE TEAR OF LEFT ROTATOR CUFF: ICD-10-CM

## 2024-06-28 DIAGNOSIS — M75.41 IMPINGEMENT SYNDROME OF RIGHT SHOULDER: ICD-10-CM

## 2024-06-28 DIAGNOSIS — Z98.890 STATUS POST ARTHROSCOPY OF LEFT SHOULDER: ICD-10-CM

## 2024-06-28 DIAGNOSIS — S43.431D SUPERIOR GLENOID LABRUM LESION OF RIGHT SHOULDER, SUBSEQUENT ENCOUNTER: ICD-10-CM

## 2024-06-28 DIAGNOSIS — M75.42 IMPINGEMENT SYNDROME OF LEFT SHOULDER: ICD-10-CM

## 2024-06-28 DIAGNOSIS — M75.111 NONTRAUMATIC INCOMPLETE TEAR OF RIGHT ROTATOR CUFF: Primary | ICD-10-CM

## 2024-06-28 DIAGNOSIS — M75.52 BURSITIS OF LEFT SHOULDER: ICD-10-CM

## 2024-06-28 PROBLEM — S43.431A SUPERIOR GLENOID LABRUM LESION OF RIGHT SHOULDER: Status: ACTIVE | Noted: 2024-02-20

## 2024-06-28 RX ORDER — CARIPRAZINE 1.5 MG/1
1 CAPSULE, GELATIN COATED ORAL DAILY
COMMUNITY
Start: 2024-05-24

## 2024-06-28 NOTE — PROGRESS NOTES
[facility] [place] [facility] Office Follow Up - Benedict Price MD  [facility]    Office Follow Up Visit       Patient Name: Yeni Olivo    Chief Complaint:   Chief Complaint   Patient presents with    Follow-up     3 month follow-up: 3.5 months S/P Left Arthroscopic biceps tenodesis, Arthroscopic extensive debridement, Arthroscopic subacromial decompression with acromioplasty (3/11/24); Biceps tendinitis of right upper extremity       Referring Physician: No ref. provider found    History of Present Illness:   It has been 3  month(s) since Yeni Olivo's last visit. Yeni Olivo returns to clinic today for F/U: postoperative follow-up of leftBody Part: shoulderReason: arthroscopy. The issue has been ongoing for 3.5 month(s) (DOS: 3/11/24). Yeni Olivo rates HIS/HER: herpain at 3/10 on the pain scale. Previous/current treatments: bracing, NSAIDS, physical therapy, and steroid injection (last injection 10/26/24). Current symptoms:Symptoms: pain and stiffness. The pain is worse with  heavy lifting ; resting improves the pain. Overall, he/she: sheis doing better. I have reviewed the patient's history of present illness as noted/entered above.    I have reviewed the patient's past medical history, surgical history, social history, family history, medications, and allergies as noted in the electronic medical record and as noted/entered.  I have reviewed the patient's review of systems as noted/enter and updated as noted in the patient's HPI.        6/28/2024:  LEFT SHOULDER BTD on 3/11/2024 -- left shoulder doing very well    brother César Garcia    RIGHT SHOULDER is painful and may consider operative intervention pending updated MRI findings.    Case management; now doing insurance/utilization review    I reviewed her MRI from 7/13/2020 as noted below and interpreted; PT  supraspinatus, SLAP tear:  EXAMINATION: MRI SHOULDER RIGHT WO CONTRAST- 07/13/2020     INDICATION: Shoulder pain, prior xray, rotator cuff tear / impingement  suspected; M75.41-Impingement syndrome of right shoulder;  M75.42-Impingement syndrome of left shoulder      TECHNIQUE: Multiplanar MRI of the right shoulder without intravenous  contrast     COMPARISON: Shoulder radiographs dated 09/05/2018     FINDINGS: Osseous structures demonstrate moderate DJD of the AC joint  interval without interval widening. Subacromial/subdeltoid fluid with  components of mass effect on the underlying myotendinous junction  supraspinatus however no increased signal at the myotendinous junction.  No acute fracture or aggressive bone marrow signal findings of cystic  degeneration of the humeral head and degenerative findings at the  greater tuberosity humerus.     Rotator cuff: Moderate hyperintense T2 signal within the distal  supraspinous fibers of a midportion and slight posterior predominance  articular sided of high-grade partial-thickness tearing without  full-thickness involvement or retraction. Minimal fatty infiltration  without atrophy of the muscle belly clearly evident. Infraspinous has  minimal increased signal of likely tendinopathy or minimal partial  thickness tearing without significant involvement or retraction.  Subscapularis intact and unremarkable. Long head of the biceps  well-seated within the bicipital groove. Teres minor intact.     On the non arthrographic evaluation there is noted tearing of the  anterior superior glenoid labrum with a slight anterior to posterior  directionality of SLAP tearing without periosteal stripping or osseous  fragmentation involvement. Questionable mild inferior capsular  thickening however quadrilateral space and axillary pouch otherwise  unremarkable with perineural fat preserved.     IMPRESSION:  1. High-grade partial thickness tearing supraspinatus with fraying and  involvement  of the midportion and posterior fibers of an articular sided  predominance without retraction demonstrating mild fatty infiltration.  No muscle belly atrophy.  2. SLAP tearing configuration of the anterior superior glenoid labrum.  3. Moderate DJD of the AC joint with associated small volume  subacromial/subdeltoid fluid and minimal mass effect on the underlying  supraspinatus myotendinous junction.      D:  07/13/2020  E:  07/14/2020     This report was finalized on 7/14/2020 6:58 PM by Dr. José Antonio Abreu.         Date of Surgery: 3/11/2024  SIDE: LEFT shoulder  Preoperative Diagnosis:  1.     Biceps tendinopathy SLAP 2 tearing, extra-articular partial biceps tearing- treated with arthroscopic biceps tenodesis - CPT 01938  2.    Early glenohumeral joint arthritis with cartilage loss/chondromalacia on the humeral head and on the glenoid, degenerative labral tearing, partial bursal sided rotator cuff tearing-treated with extensive debridement CPT 78013  3.     Shoulder impingement syndrome, partial CA ligament tearing- treated with arthroscopic subacromial decompression with acromioplasty - CPT 70315     Postoperative Diagnosis:  1.     SAME as preoperative diagnoses --glenohumeral joint arthritis was far more advanced when viewed arthroscopically     Additionally she did have some stiffness of the left shoulder joint that improved with examination under anesthesia and improved with extensive debridement.  She had extensive changes within the glenohumeral joint and in the subacromial space.  Shoulder remained stable throughout.     Procedure:  1.     Arthroscopic biceps tenodesis  - CPT 41768  2.     Arthroscopic extensive debridement - CPT 06406  3.     Arthroscopic subacromial decompression with acromioplasty - CPT 75813        Admission status: elective outpatient surgery      PRIOR HISTORY:  LEFT SHOULDER        5/4/2023:  Bilateral shoulder pain the right responded to the injection, the left pain persists.  She  has had injections for many years prior was under the care of Dr. Virk.  She is responded to steroid injections with many injections over the years but has done incredibly well.     Bilateral subacromial injections 4/20/2023; RIGHT doing well     No evidence of infection no erythema no evidence of septic joint.  Exacerbation following a prior injection.  Counseled on treatment options.     New baby grandson, Malcom. 1 week old     The pain today in the left shoulder is more anterior biceps pain.  I do think it is reasonable for an injection for that and conservative course for that.  Did  on possible MRI        10/26/2023:  Bilateral shoulder pain, left worse than right     Overall the left shoulder feels much worse.  Desires bilateral biceps tendon sheath injections.  She notes significant improvement even prior to departure from the clinic today.        She would like to wait until after the new year to pursue updated x-rays and possible MRI of the left shoulder.  She would like to consider left shoulder surgery.  We will need updated imaging and MRI at that time.           1/26/2024:  LEFT SHOULDER PAIN  Left shoulder has been bothering her for many years.  I personally reviewed her MRI of the left shoulder in the [facility] from 7/13/2020 she is supraspinatus tendinopathy, partial tearing, degenerative changes the labrum, degenerative biceps findings and AC joint arthritic findings.     She has had a history of significant anterior shoulder pain consistent with biceps pathology and today has more notable left AC joint pain.     Working from home        2/20/2024:  LEFT SHOULDER  MRI [facility] completed 2/14/2024 -- LEFT SHOULDER tendinopathy, RC intact, baseline degenerative changes     Lost her  4 years ago.  They were  in [place] on 2/14     Significant anterior biceps, she is interested in discussing possible operative intervention for continued biceps pain and biceps pathology  despite prolonged conservative course under our orthopedic team care including my partner dating back to at least 2018.  Conservative course over the last 6 years      Subjective   Subjective      Review of Systems   Constitutional: Negative.  Negative for chills, fatigue and fever.   HENT: Negative.  Negative for congestion and dental problem.    Eyes: Negative.  Negative for blurred vision.   Respiratory: Negative.  Negative for shortness of breath.    Cardiovascular: Negative.  Negative for leg swelling.   Gastrointestinal: Negative.  Negative for abdominal pain.   Endocrine: Negative.  Negative for polyuria.   Genitourinary: Negative.  Negative for difficulty urinating.   Musculoskeletal:  Positive for arthralgias.   Skin: Negative.    Allergic/Immunologic: Negative.    Neurological: Negative.    Hematological: Negative.  Negative for adenopathy.   Psychiatric/Behavioral: Negative.  Negative for behavioral problems.         Past Medical History:   Past Medical History:   Diagnosis Date    Arthritis     Arthritis of back     Arthritis of neck     Breast cancer     2006 LEFT     Cervical disc disorder     Drug therapy     Hip arthrosis     Knee swelling     Lumbosacral disc disease     Lung nodule     Neck strain     Periarthritis of shoulder     Radiation     Rotator cuff syndrome        Past Surgical History:   Past Surgical History:   Procedure Laterality Date    BREAST BIOPSY       SECTION      HYSTERECTOMY      MASTECTOMY      BILATERAL    OTHER SURGICAL HISTORY  2018    stones removed from submandibular gland     SHOULDER ARTHROSCOPY Left 2024    Arthroscopic biceps tenodesis, Arthroscopic extensive debridement, Arthroscopic subacromial decompression with acromioplasty - Dr. Benedict Price    SHOULDER SURGERY  3/11/24    TRIGGER POINT INJECTION  Bilateral shoulders       Family History:   Family History   Problem Relation Age of Onset    COPD Mother     Diabetes Mother     No Known Problems  Father     Multiple sclerosis Sister     No Known Problems Brother     ADD / ADHD Daughter     Drug abuse Daughter     Bipolar disorder Daughter     No Known Problems Son     Stroke Maternal Grandmother     No Known Problems Paternal Grandmother     No Known Problems Maternal Aunt     No Known Problems Paternal Aunt        Social History:   Social History     Socioeconomic History    Marital status:     Number of children: 2   Tobacco Use    Smoking status: Former     Current packs/day: 0.00     Average packs/day: 0.3 packs/day for 40.0 years (10.0 ttl pk-yrs)     Types: Cigarettes     Start date: 3/1/1981     Quit date: 3/1/2021     Years since quitting: 3.3     Passive exposure: Past    Smokeless tobacco: Never    Tobacco comments:     occasional vape   Vaping Use    Vaping status: Former    Substances: Nicotine, Flavoring    Devices: Disposable, Pre-filled pod    Passive vaping exposure: Yes   Substance and Sexual Activity    Alcohol use: Not Currently    Drug use: Never     Types: Marijuana    Sexual activity: Not Currently     Partners: Male       Medications:   Current Outpatient Medications:     acetaminophen (TYLENOL) 500 MG tablet, Take 1 tablet by mouth Every 6 (Six) Hours As Needed for Mild Pain. Pt states she takes 2 in the AM & 1 in the PM, Disp: , Rfl:     acyclovir (ZOVIRAX) 400 MG tablet, Take 1 tablet by mouth 2 (two) times a day., Disp: 180 tablet, Rfl: 1    venlafaxine XR (EFFEXOR-XR) 150 MG 24 hr capsule, Take 1 capsule by mouth Daily., Disp: 90 capsule, Rfl: 0    Vraylar 1.5 MG capsule capsule, Take 1 capsule by mouth Daily., Disp: , Rfl:     Allergies:   Allergies   Allergen Reactions    Penicillins Anaphylaxis    Codeine Nausea Only    Reglan [Metoclopramide] Unknown (See Comments)     neurological    Talwin [Pentazocine] Other (See Comments)     Oral swelling.        The following portions of the patient's history were reviewed and updated as appropriate: allergies, current  "medications, past family history, past medical history, past social history, past surgical history and problem list.        Objective    Objective      Vital Signs:   Vitals:    06/28/24 1126   BP: 120/78   Weight: 65.5 kg (144 lb 6.4 oz)   Height: 157.5 cm (62\")       Ortho Exam:  Left shoulder  Incisions healed  Excellent motion and strength on the left shoulder no anterior biceps pain      RIGHT SHOULDER  Anterior biceps pain  Pain with rotator cuff testing  Negative AC joint pain, Neer positive, Mcintyre positive some pain with Jobes testing but good strength.  Positive uppercut positive speeds    Results Review:  Imaging Results (Last 24 Hours)       ** No results found for the last 24 hours. **              Procedures            Assessment / Plan      Assessment/Plan:   Problem List Items Addressed This Visit          Musculoskeletal and Injuries    Bursitis of left shoulder    Impingement syndrome of left shoulder    Biceps tendinitis of left upper extremity    Nontraumatic incomplete tear of right rotator cuff    Bursitis of right shoulder    Biceps tendinitis of right upper extremity    Injury of left rotator cuff    Nontraumatic incomplete tear of left rotator cuff    Superior glenoid labrum lesion of right shoulder    Status post arthroscopy of left shoulder - Primary    Primary localized osteoarthrosis of left shoulder region    Impingement syndrome of right shoulder       RIGHT SHOULDER  MRI of the shoulder is recommended.  Indication: Known SLAP tear and known rotator cuff tear  The MRI is critical to evaluate for rotator cuff tearing and will help with possible surgical planning.  Right shoulder has known rotator cuff tearing dating back to 2020 MRI was personally reviewed and interpreted.  We need an updated MRI to see if possible surgical intervention may be needed    Follow Up: AFTER RIGHT SHOULDER MRI --discussed that we could talk by phone pending the results of the MRI to discuss next steps to " save her the significant travel time.      Benedict Price MD, FAAOS  Orthopedic Surgeon, Shoulder Surgery  [facility]  Orthopedics and Sports Medicine  [address], [address]  [place] [address]    & New Location:  [facility]  [address], [address]  [place] [address]    06/28/24  11:46 EDT

## 2024-07-16 DIAGNOSIS — M75.41 IMPINGEMENT SYNDROME OF RIGHT SHOULDER: ICD-10-CM

## 2024-07-16 DIAGNOSIS — S43.431D SUPERIOR GLENOID LABRUM LESION OF RIGHT SHOULDER, SUBSEQUENT ENCOUNTER: ICD-10-CM

## 2024-07-16 DIAGNOSIS — M75.21 BICEPS TENDINITIS OF RIGHT UPPER EXTREMITY: ICD-10-CM

## 2024-07-16 DIAGNOSIS — M75.51 BURSITIS OF RIGHT SHOULDER: ICD-10-CM

## 2024-07-16 DIAGNOSIS — M75.111 NONTRAUMATIC INCOMPLETE TEAR OF RIGHT ROTATOR CUFF: ICD-10-CM

## 2024-07-18 ENCOUNTER — DOCUMENTATION (OUTPATIENT)
Dept: ORTHOPEDIC SURGERY | Facility: CLINIC | Age: 64
End: 2024-07-18
Payer: COMMERCIAL

## 2024-07-18 NOTE — PROGRESS NOTES
I personally spoke with Ms. Olivo regarding her right shoulder MRI completed at [facility] 7/13/2024 she has baseline degenerative AC joint changes she has some undersurface articular sided tearing of the supraspinatus with perhaps a small portion into the infraspinatus no obvious full-thickness tearing.  She has SLAP tearing with degenerative labral tearing and findings at the base of the biceps.  She has some baseline degenerative findings within the joint and the bursal side as well.      Overall she is an excellent lady and incredibly stoic.  She will continue to monitor symptoms.  She does note that if she needs surgery that she would like to do it this calendar year which would be very reasonable.  I am hopeful that she will continue to see gains with conservative course.  She will keep me updated pending progress.

## 2024-12-19 ENCOUNTER — TRANSCRIBE ORDERS (OUTPATIENT)
Dept: ADMINISTRATIVE | Facility: HOSPITAL | Age: 64
End: 2024-12-19
Payer: COMMERCIAL

## 2025-02-13 ENCOUNTER — HOSPITAL ENCOUNTER (OUTPATIENT)
Dept: MRI IMAGING | Facility: HOSPITAL | Age: 65
Discharge: HOME | End: 2025-02-13
Admitting: INTERNAL MEDICINE
Payer: COMMERCIAL

## 2025-02-13 DIAGNOSIS — K85.00 IDIOPATHIC ACUTE PANCREATITIS WITHOUT INFECTION OR NECROSIS: ICD-10-CM

## 2025-02-13 PROCEDURE — 74181 MRI ABDOMEN W/O CONTRAST: CPT
